# Patient Record
Sex: MALE | Race: BLACK OR AFRICAN AMERICAN | Employment: PART TIME | URBAN - METROPOLITAN AREA
[De-identification: names, ages, dates, MRNs, and addresses within clinical notes are randomized per-mention and may not be internally consistent; named-entity substitution may affect disease eponyms.]

---

## 2018-03-04 ENCOUNTER — ED HISTORICAL/CONVERTED ENCOUNTER (OUTPATIENT)
Dept: OTHER | Age: 43
End: 2018-03-04

## 2018-03-06 ENCOUNTER — ED HISTORICAL/CONVERTED ENCOUNTER (OUTPATIENT)
Dept: OTHER | Age: 43
End: 2018-03-06

## 2019-09-22 ENCOUNTER — ED HISTORICAL/CONVERTED ENCOUNTER (OUTPATIENT)
Dept: OTHER | Age: 44
End: 2019-09-22

## 2019-09-23 ENCOUNTER — ED HISTORICAL/CONVERTED ENCOUNTER (OUTPATIENT)
Dept: OTHER | Age: 44
End: 2019-09-23

## 2020-06-29 ENCOUNTER — ED HISTORICAL/CONVERTED ENCOUNTER (OUTPATIENT)
Dept: OTHER | Age: 45
End: 2020-06-29

## 2020-07-30 ENCOUNTER — OP HISTORICAL/CONVERTED ENCOUNTER (OUTPATIENT)
Dept: OTHER | Age: 45
End: 2020-07-30

## 2020-08-04 DIAGNOSIS — R10.9 UNSPECIFIED ABDOMINAL PAIN: ICD-10-CM

## 2020-08-04 RX ORDER — OMEPRAZOLE 40 MG/1
CAPSULE, DELAYED RELEASE ORAL
Qty: 30 CAP | Refills: 2 | Status: SHIPPED | OUTPATIENT
Start: 2020-08-04 | End: 2020-11-08

## 2020-10-04 ENCOUNTER — APPOINTMENT (OUTPATIENT)
Dept: CT IMAGING | Age: 45
End: 2020-10-04
Attending: EMERGENCY MEDICINE
Payer: MEDICARE

## 2020-10-04 ENCOUNTER — HOSPITAL ENCOUNTER (EMERGENCY)
Age: 45
Discharge: HOME | End: 2020-10-04
Attending: EMERGENCY MEDICINE
Payer: MEDICARE

## 2020-10-04 VITALS
BODY MASS INDEX: 43.16 KG/M2 | TEMPERATURE: 98.1 F | OXYGEN SATURATION: 98 % | SYSTOLIC BLOOD PRESSURE: 148 MMHG | RESPIRATION RATE: 16 BRPM | WEIGHT: 275 LBS | DIASTOLIC BLOOD PRESSURE: 86 MMHG | HEART RATE: 86 BPM | HEIGHT: 67 IN

## 2020-10-04 DIAGNOSIS — G51.0 BELL'S PALSY: Primary | ICD-10-CM

## 2020-10-04 LAB
ALBUMIN SERPL-MCNC: 4 G/DL (ref 3.5–5)
ALBUMIN/GLOB SERPL: 1.1 {RATIO} (ref 1.1–2.2)
ALP SERPL-CCNC: 91 U/L (ref 45–117)
ALT SERPL-CCNC: 34 U/L (ref 12–78)
ANION GAP SERPL CALC-SCNC: 4 MMOL/L (ref 5–15)
AST SERPL W P-5'-P-CCNC: 27 U/L (ref 15–37)
BILIRUB DIRECT SERPL-MCNC: 0.1 MG/DL (ref 0–0.2)
BILIRUB SERPL-MCNC: 0.4 MG/DL (ref 0.2–1)
BUN SERPL-MCNC: 11 MG/DL (ref 6–20)
BUN/CREAT SERPL: 12 (ref 12–20)
CA-I BLD-MCNC: 9.1 MG/DL (ref 8.5–10.1)
CHLORIDE SERPL-SCNC: 102 MMOL/L (ref 97–108)
CO2 SERPL-SCNC: 29 MMOL/L (ref 21–32)
CREAT SERPL-MCNC: 0.9 MG/DL (ref 0.7–1.3)
ERYTHROCYTE [DISTWIDTH] IN BLOOD BY AUTOMATED COUNT: 13.3 % (ref 11.5–14.5)
GLOBULIN SER CALC-MCNC: 3.8 G/DL (ref 2–4)
GLUCOSE SERPL-MCNC: 104 MG/DL (ref 65–100)
HCT VFR BLD AUTO: 45 % (ref 36.6–50.3)
HGB BLD-MCNC: 16.4 G/DL (ref 12.1–17)
LIPASE SERPL-CCNC: 84 U/L (ref 73–393)
MCH RBC QN AUTO: 30.5 PG (ref 26–34)
MCHC RBC AUTO-ENTMCNC: 36.4 G/DL (ref 30–36.5)
MCV RBC AUTO: 83.8 FL (ref 80–99)
PLATELET # BLD AUTO: 295 K/UL (ref 150–400)
PMV BLD AUTO: 9.4 FL (ref 8.9–12.9)
POTASSIUM SERPL-SCNC: 4.2 MMOL/L (ref 3.5–5.1)
PROT SERPL-MCNC: 7.8 G/DL (ref 6.4–8.2)
RBC # BLD AUTO: 5.37 M/UL (ref 4.1–5.7)
SODIUM SERPL-SCNC: 135 MMOL/L (ref 136–145)
TROPONIN I SERPL-MCNC: <0.05 NG/ML
WBC # BLD AUTO: 6.8 K/UL (ref 4.1–11.1)

## 2020-10-04 PROCEDURE — 93005 ELECTROCARDIOGRAM TRACING: CPT

## 2020-10-04 PROCEDURE — 96374 THER/PROPH/DIAG INJ IV PUSH: CPT

## 2020-10-04 PROCEDURE — 84484 ASSAY OF TROPONIN QUANT: CPT

## 2020-10-04 PROCEDURE — 99281 EMR DPT VST MAYX REQ PHY/QHP: CPT

## 2020-10-04 PROCEDURE — 80048 BASIC METABOLIC PNL TOTAL CA: CPT

## 2020-10-04 PROCEDURE — 70450 CT HEAD/BRAIN W/O DYE: CPT

## 2020-10-04 PROCEDURE — 74011000636 HC RX REV CODE- 636: Performed by: EMERGENCY MEDICINE

## 2020-10-04 PROCEDURE — 74011250636 HC RX REV CODE- 250/636: Performed by: EMERGENCY MEDICINE

## 2020-10-04 PROCEDURE — 96375 TX/PRO/DX INJ NEW DRUG ADDON: CPT

## 2020-10-04 PROCEDURE — 83690 ASSAY OF LIPASE: CPT

## 2020-10-04 PROCEDURE — 74177 CT ABD & PELVIS W/CONTRAST: CPT

## 2020-10-04 PROCEDURE — 80076 HEPATIC FUNCTION PANEL: CPT

## 2020-10-04 PROCEDURE — 36415 COLL VENOUS BLD VENIPUNCTURE: CPT

## 2020-10-04 PROCEDURE — 85027 COMPLETE CBC AUTOMATED: CPT

## 2020-10-04 RX ORDER — KETOROLAC TROMETHAMINE 30 MG/ML
15 INJECTION, SOLUTION INTRAMUSCULAR; INTRAVENOUS
Status: DISCONTINUED | OUTPATIENT
Start: 2020-10-04 | End: 2020-10-04

## 2020-10-04 RX ORDER — ACETAMINOPHEN 500 MG
TABLET ORAL
Qty: 1 EACH | Refills: 0 | Status: SHIPPED | OUTPATIENT
Start: 2020-10-04 | End: 2021-09-16 | Stop reason: ALTCHOICE

## 2020-10-04 RX ORDER — KETOROLAC TROMETHAMINE 30 MG/ML
30 INJECTION, SOLUTION INTRAMUSCULAR; INTRAVENOUS
Status: COMPLETED | OUTPATIENT
Start: 2020-10-04 | End: 2020-10-04

## 2020-10-04 RX ORDER — PREDNISONE 50 MG/1
50 TABLET ORAL DAILY
Qty: 7 TAB | Refills: 0 | Status: SHIPPED | OUTPATIENT
Start: 2020-10-04 | End: 2020-10-11

## 2020-10-04 RX ORDER — METOCLOPRAMIDE HYDROCHLORIDE 5 MG/ML
10 INJECTION INTRAMUSCULAR; INTRAVENOUS
Status: COMPLETED | OUTPATIENT
Start: 2020-10-04 | End: 2020-10-04

## 2020-10-04 RX ORDER — MINERAL OIL, PETROLATUM 425; 568 MG/G; MG/G
OINTMENT OPHTHALMIC
Qty: 7 G | Refills: 0 | Status: SHIPPED | OUTPATIENT
Start: 2020-10-04 | End: 2020-10-04 | Stop reason: SDUPTHER

## 2020-10-04 RX ORDER — DIPHENHYDRAMINE HYDROCHLORIDE 50 MG/ML
50 INJECTION, SOLUTION INTRAMUSCULAR; INTRAVENOUS ONCE
Status: COMPLETED | OUTPATIENT
Start: 2020-10-04 | End: 2020-10-04

## 2020-10-04 RX ORDER — PREDNISONE 50 MG/1
50 TABLET ORAL DAILY
Qty: 7 TAB | Refills: 0 | Status: SHIPPED | OUTPATIENT
Start: 2020-10-04 | End: 2020-10-04 | Stop reason: SDUPTHER

## 2020-10-04 RX ORDER — ACETAMINOPHEN 500 MG
TABLET ORAL
Qty: 1 EACH | Refills: 0 | Status: SHIPPED | OUTPATIENT
Start: 2020-10-04 | End: 2020-10-04 | Stop reason: SDUPTHER

## 2020-10-04 RX ORDER — ACYCLOVIR 800 MG/1
800 TABLET ORAL 3 TIMES DAILY
Qty: 15 TAB | Refills: 0 | Status: SHIPPED | OUTPATIENT
Start: 2020-10-04 | End: 2020-10-04 | Stop reason: SDUPTHER

## 2020-10-04 RX ORDER — MINERAL OIL, PETROLATUM 425; 568 MG/G; MG/G
OINTMENT OPHTHALMIC
Qty: 7 G | Refills: 0 | Status: SHIPPED | OUTPATIENT
Start: 2020-10-04 | End: 2021-09-16 | Stop reason: ALTCHOICE

## 2020-10-04 RX ORDER — ACYCLOVIR 800 MG/1
800 TABLET ORAL 3 TIMES DAILY
Qty: 15 TAB | Refills: 0 | Status: SHIPPED | OUTPATIENT
Start: 2020-10-04 | End: 2020-10-09

## 2020-10-04 RX ADMIN — METOCLOPRAMIDE 10 MG: 5 INJECTION, SOLUTION INTRAMUSCULAR; INTRAVENOUS at 11:47

## 2020-10-04 RX ADMIN — KETOROLAC TROMETHAMINE 30 MG: 30 INJECTION, SOLUTION INTRAMUSCULAR at 11:48

## 2020-10-04 RX ADMIN — IOPAMIDOL 100 ML: 755 INJECTION, SOLUTION INTRAVENOUS at 12:37

## 2020-10-04 RX ADMIN — SODIUM CHLORIDE 1000 ML: 9 INJECTION, SOLUTION INTRAVENOUS at 11:46

## 2020-10-04 RX ADMIN — DIPHENHYDRAMINE HYDROCHLORIDE 50 MG: 50 INJECTION, SOLUTION INTRAMUSCULAR; INTRAVENOUS at 11:47

## 2020-10-04 NOTE — ED PROVIDER NOTES
EMERGENCY DEPARTMENT HISTORY AND PHYSICAL EXAM        Date: 10/4/2020  Patient Name: Floridalma Atkinson    History of Presenting Illness     Chief Complaint   Patient presents with    Facial Droop    Numbness     to his face    Headache       History Provided By: Patient    HPI: Floridalma Atkinson, 41 y.o. male with past medical history of hypertension and migraines who presents with headache and numbness to his face. States symptoms started yesterday around 4 PM.  The day before that he also noticed he was having difficulty with eating. At 4 PM yesterday he noticed that he was having numbness on the right side of his face and felt like his face was swelling on the right. Family members noticed his face was asymmetric as well. He is also having a headache. States that he does have headaches often and has been dealing with this for years. Headache is on the right side of his head, constant, and nonradiating. PCP: Zaira Miller MD    Current Outpatient Medications   Medication Sig Dispense Refill    omeprazole (PRILOSEC) 40 mg capsule TAKE 1 CAPSULE BY MOUTH ONCE DAILY,30 MINUTES BEFORE EATING 30 Cap 2    lisinopril (PRINIVIL, ZESTRIL) 10 mg tablet Take 1 Tab by mouth daily. 90 Tab 1    oxybutynin chloride XL (DITROPAN XL) 10 mg CR tablet Take 1 Tab by mouth daily. 30 Tab 0       Past History     Past Medical History:  Past Medical History:   Diagnosis Date    Hypertension     Migraines        Past Surgical History:  Past Surgical History:   Procedure Laterality Date    HX APPENDECTOMY         Family History:  No family history on file. Social History:  Social History     Tobacco Use    Smoking status: Never Smoker    Smokeless tobacco: Never Used   Substance Use Topics    Alcohol use: No    Drug use: Not on file       Allergies:   Allergies   Allergen Reactions    Keflex [Cephalexin] Anaphylaxis and Shortness of Breath       Review of Systems   Review of Systems   Constitutional: Negative for fever. HENT: Negative for congestion. Eyes: Negative for visual disturbance. Respiratory: Negative for shortness of breath. Cardiovascular: Negative for chest pain. Gastrointestinal: Negative for abdominal pain. Genitourinary: Negative for dysuria. Musculoskeletal: Negative for arthralgias. Skin: Negative for rash. Neurological: Positive for facial asymmetry, numbness and headaches. Physical Exam   General: No acute distressed. Well-nourished. Skin: No rash. Head: Normocephalic. Atraumatic. Eye: No proptosis or conjunctival injections. Respiratory: No apparent respiratory distress. Gastrointestinal: Nondistended. Musculoskeletal: No obvious bony deformities. Psychiatric: Cooperative. Appropriate mood and affect. Neuro: Left-sided facial droop including the forehead. 5 out of 5 strength in bilateral upper and lower extremities. Intact sensation to light touch in extremities and on the face. No other focal deficits. Diagnostic Study Results     Labs -   No results found for this or any previous visit (from the past 24 hour(s)). Radiologic Studies -   No orders to display     CT Results  (Last 48 hours)    None        CXR Results  (Last 48 hours)    None        Medical Decision Making and ED Course     I reviewed the available vital signs, nursing notes, past medical history, past surgical history, family history, and social history. Vital Signs - Reviewed the patient's vital signs. Patient Vitals for the past 12 hrs:   Temp Pulse Resp BP SpO2   10/04/20 1044 98.1 °F (36.7 °C) 86 16 (!) 148/86 98 %     EKG interpretation: Normal sinus rhythm at 86 bpm.  MT interval 144 ms. QRS duration and QTc intervals are both normal.  No ST segment abnormalities. Normal axis. Medical Decision Making:   Presented with headache and right-sided facial sensation.  The  differential diagnosis is Bell's palsy, stroke, migraine headache, tension headache, intracranial hemorrhage. Suspect likely Bell's palsy based on my clinical assessment. CT head shows no intracranial hemorrhage. Headache treated with medications here in the ER. He did develop abdominal pain in the right upper near the chest so brought her work-up was completed. This pain improved on its own. Negative work-up was unremarkable and patient was well-appearing. Do not suspect stroke based on patient physical exam.  I discussed this with neurologist and he felt it was reasonable to discharge to home with treatment for Bell's palsy. We will follow-up with neurology only as needed. I prescribed acyclovir, prednisone, eyepatch, and moistening eye ointment for the left eye. Disposition     Discharged    DISCHARGE PLAN:  1. Current Discharge Medication List      CONTINUE these medications which have NOT CHANGED    Details   omeprazole (PRILOSEC) 40 mg capsule TAKE 1 CAPSULE BY MOUTH ONCE DAILY,30 MINUTES BEFORE EATING  Qty: 30 Cap, Refills: 2    Associated Diagnoses: Unspecified abdominal pain      lisinopril (PRINIVIL, ZESTRIL) 10 mg tablet Take 1 Tab by mouth daily. Qty: 90 Tab, Refills: 1    Associated Diagnoses: Essential hypertension      oxybutynin chloride XL (DITROPAN XL) 10 mg CR tablet Take 1 Tab by mouth daily. Qty: 30 Tab, Refills: 0    Associated Diagnoses: OAB (overactive bladder)           2. Follow-up Information     Follow up With Specialties Details Why [facility] EMERGENCY DEPT Emergency Medicine Go today As soon as possible if symptoms worsen [address] [facility] Rodrigo Winston MD Neurology Schedule an appointment as soon as possible for a visit in 1 week As needed if symptoms worsen or do not improve [address]  [phone]          3. Return to ED if worse     Diagnosis     Clinical impression:   1.  Bell's palsy       Attestation:  Please note that this dictation was completed with Dragon, the computer voice recognition software. Quite often unanticipated grammatical, syntax, homophones, and other interpretive errors are inadvertently transcribed by the computer software. Please disregard these errors. Please excuse any errors that have escaped final proofreading. Thank you.   Sanket Thomas, DO

## 2020-10-05 LAB
ATRIAL RATE: 86 BPM
CALCULATED P AXIS, ECG09: 39 DEGREES
CALCULATED R AXIS, ECG10: 6 DEGREES
CALCULATED T AXIS, ECG11: 16 DEGREES
DIAGNOSIS, 93000: NORMAL
P-R INTERVAL, ECG05: 144 MS
Q-T INTERVAL, ECG07: 354 MS
QRS DURATION, ECG06: 86 MS
QTC CALCULATION (BEZET), ECG08: 423 MS
VENTRICULAR RATE, ECG03: 86 BPM

## 2020-10-10 PROBLEM — D72.829 LEUKOCYTOSIS: Status: ACTIVE | Noted: 2020-10-10

## 2020-10-10 PROBLEM — R35.0 INCREASED FREQUENCY OF URINATION: Status: ACTIVE | Noted: 2020-10-10

## 2020-10-10 PROBLEM — M79.641 PAIN IN BOTH HANDS: Status: ACTIVE | Noted: 2020-10-10

## 2020-10-10 PROBLEM — L91.8 MULTIPLE ACQUIRED SKIN TAGS: Status: ACTIVE | Noted: 2020-10-10

## 2020-10-10 PROBLEM — J30.2 SEASONAL ALLERGIC RHINITIS: Status: ACTIVE | Noted: 2020-10-10

## 2020-10-10 PROBLEM — R20.2 PARESTHESIA OF LEFT UPPER LIMB: Status: ACTIVE | Noted: 2020-10-10

## 2020-10-10 PROBLEM — R25.2 HAND CRAMPS: Status: ACTIVE | Noted: 2020-10-10

## 2020-10-10 PROBLEM — N52.9 IMPOTENCE: Status: ACTIVE | Noted: 2020-10-10

## 2020-10-10 PROBLEM — E55.9 VITAMIN D DEFICIENCY: Status: ACTIVE | Noted: 2020-10-10

## 2020-10-10 PROBLEM — M79.642 PAIN IN BOTH HANDS: Status: ACTIVE | Noted: 2020-10-10

## 2020-10-12 ENCOUNTER — OFFICE VISIT (OUTPATIENT)
Dept: INTERNAL MEDICINE CLINIC | Age: 45
End: 2020-10-12
Payer: MEDICARE

## 2020-10-12 VITALS
DIASTOLIC BLOOD PRESSURE: 84 MMHG | HEART RATE: 72 BPM | OXYGEN SATURATION: 97 % | BODY MASS INDEX: 50.62 KG/M2 | TEMPERATURE: 98 F | WEIGHT: 315 LBS | SYSTOLIC BLOOD PRESSURE: 134 MMHG | RESPIRATION RATE: 18 BRPM | HEIGHT: 66 IN

## 2020-10-12 DIAGNOSIS — J30.2 SEASONAL ALLERGIC RHINITIS, UNSPECIFIED TRIGGER: ICD-10-CM

## 2020-10-12 DIAGNOSIS — I10 ESSENTIAL HYPERTENSION: ICD-10-CM

## 2020-10-12 DIAGNOSIS — E66.01 OBESITY, MORBID (HCC): ICD-10-CM

## 2020-10-12 DIAGNOSIS — K21.9 GASTROESOPHAGEAL REFLUX DISEASE WITHOUT ESOPHAGITIS: ICD-10-CM

## 2020-10-12 DIAGNOSIS — G51.0 BELL'S PALSY: Primary | ICD-10-CM

## 2020-10-12 PROBLEM — Z86.69 HX OF BELL'S PALSY: Status: ACTIVE | Noted: 2020-10-12

## 2020-10-12 PROCEDURE — 99214 OFFICE O/P EST MOD 30 MIN: CPT | Performed by: INTERNAL MEDICINE

## 2020-10-12 RX ORDER — METOPROLOL SUCCINATE 25 MG/1
1 TABLET, EXTENDED RELEASE ORAL DAILY
COMMUNITY
Start: 2020-07-13 | End: 2021-01-18 | Stop reason: SDUPTHER

## 2020-10-12 NOTE — PROGRESS NOTES
Roman Yung is a 41 y.o. male and presents with Follow Up Chronic Condition and Hypertension    He visited emergency room having facial drooping, left side clinically, he has been ruled out for stroke and he has left sided facial palsy, he has finished, steroid and acyclovir he is using patch and, cream to prevent dry eye. His blood pressure is almost controlled with current medication regimen, no recent other complaints he is wearing mask working at [employer], his BMI is 51.8, he has not consulted ENT, and not done physical therapy he wants to know more details about facial palsy,, he has not read the educational handouts, provided from emergency room. ,  No history of shingles or chickenpox. He visited emergency room on fourth October    Review of Systems    Review of Systems   Constitutional: Negative. HENT: Negative for tinnitus. Eyes: Negative for blurred vision. Respiratory: Negative for cough, shortness of breath and wheezing. Cardiovascular: Negative. Gastrointestinal: Negative. Genitourinary: Negative. Musculoskeletal: Negative. Neurological: Positive for focal weakness. Negative for dizziness and headaches. Lt sided Carmine palsy. palsy. Psychiatric/Behavioral: Negative for depression. The patient is not nervous/anxious.          Past Medical History:   Diagnosis Date    Hand cramps 10/10/2020    Hypercholesterolemia     Hypertension     Impotence 10/10/2020    Increased frequency of urination 10/10/2020    Leukocytosis 10/10/2020    Migraines     Multiple acquired skin tags 10/10/2020    Pain in both hands 10/10/2020    Paresthesia of left upper limb 10/10/2020    Seasonal allergic rhinitis 10/10/2020    Vitamin D deficiency 10/10/2020     Past Surgical History:   Procedure Laterality Date    HX APPENDECTOMY  1993    HX PROSTATE SURGERY      HX UROLOGICAL       Social History     Socioeconomic History    Marital status:      Spouse name: Not on file    Number of children: Not on file    Years of education: Not on file    Highest education level: Not on file   Tobacco Use    Smoking status: Never Smoker    Smokeless tobacco: Never Used   Substance and Sexual Activity    Alcohol use: No    Drug use: Never     Family History   Adopted: Yes   Family history unknown: Yes     Current Outpatient Medications   Medication Sig Dispense Refill    metoprolol succinate (TOPROL-XL) 25 mg XL tablet Take 1 Tab by mouth daily.  Eye Patch misc Apply patch to affected eye nightly at bedtime for 1-2 weeks or until facial symptoms resolve 1 Each 0    white petrolatum-mineral oiL (Refresh Lacri-Lube) 56.8-42.5 % ointment Apply small amount to affected eye nightly for 1-2 weeks or until facial symptoms have resolved 7 g 0    omeprazole (PRILOSEC) 40 mg capsule TAKE 1 CAPSULE BY MOUTH ONCE DAILY,30 MINUTES BEFORE EATING 30 Cap 2    lisinopril (PRINIVIL, ZESTRIL) 10 mg tablet Take 1 Tab by mouth daily. 90 Tab 1     Allergies   Allergen Reactions    Keflex [Cephalexin] Anaphylaxis and Shortness of Breath       Objective:  Visit Vitals  /84 (BP 1 Location: Right arm, BP Patient Position: Sitting)   Pulse 72   Temp 98 °F (36.7 °C) (Temporal)   Resp 18   Ht 5' 6\" (1.676 m)   Wt 321 lb 6.4 oz (145.8 kg)   SpO2 97%   BMI 51.88 kg/m²       Physical Exam:   Constitutional: General Appearance: Morbid obese with pleasant . Level of Distress: NAD. Psychiatric: Mental Status: normal mood and affect Orientation: to time, place, and person. ,normal eye contact. Head: Head: normocephalic and atraumatic. Eyes: Pupils: PERRLA. Sclerae: non-icteric. Not able to close left eye completely due to facial palsy  Neck: Neck: supple, trachea midline, and no masses. Lymph Nodes: no cervical LAD. Thyroid: no enlargement or nodules and non-tender. Lungs: Respiratory effort: no dyspnea.  Auscultation: no wheezing, rales/crackles, or rhonchi and breath sounds normal and good air movement. Cardiovascular: Apical Impulse: not displaced. Heart Auscultation: normal S1 and S2; no murmurs, rubs, or gallops; and RRR. Neck vessels: no carotid bruits. Pulses including femoral / pedal: normal throughout. Abdomen: Bowel Sounds: normal. Inspection and Palpation: no tenderness, guarding, or masses and soft and non-distended. Liver: non-tender and no hepatomegaly. Spleen: non-tender and no splenomegaly. Musculoskeletal[de-identified] Extremities: no edema,no varicosities. No Calf tenderness. Neurologic: Gait and Station: normal gait and station. Motor Strength normal right  left-sided face weakness due to left-sided Bell's palsy. Skin: Inspection and palpation: no rash, lesions, or ulcer. Left-sided Bell's palsy  Results for orders placed or performed during the hospital encounter of 83/07/48   METABOLIC PANEL, BASIC   Result Value Ref Range    Sodium 135 (L) 136 - 145 mmol/L    Potassium 4.2 3.5 - 5.1 mmol/L    Chloride 102 97 - 108 mmol/L    CO2 29 21 - 32 mmol/L    Anion gap 4 (L) 5 - 15 mmol/L    Glucose 104 (H) 65 - 100 mg/dL    BUN 11 6 - 20 mg/dL    Creatinine 0.90 0.70 - 1.30 mg/dL    BUN/Creatinine ratio 12 12 - 20      GFR est AA >60 >60 ml/min/1.73m2    GFR est non-AA >60 >60 ml/min/1.73m2    Calcium 9.1 8.5 - 10.1 mg/dL   CBC W/O DIFF   Result Value Ref Range    WBC 6.8 4.1 - 11.1 K/uL    RBC 5.37 4.10 - 5.70 M/uL    HGB 16.4 12.1 - 17.0 g/dL    HCT 45.0 36.6 - 50.3 %    MCV 83.8 80.0 - 99.0 FL    MCH 30.5 26.0 - 34.0 PG    MCHC 36.4 30.0 - 36.5 g/dL    RDW 13.3 11.5 - 14.5 %    PLATELET 751 362 - 134 K/uL    MPV 9.4 8.9 - 12.9 FL   HEPATIC FUNCTION PANEL   Result Value Ref Range    Protein, total 7.8 6.4 - 8.2 g/dL    Albumin 4.0 3.5 - 5.0 g/dL    Globulin 3.8 2.0 - 4.0 g/dL    A-G Ratio 1.1 1.1 - 2.2      Bilirubin, total 0.4 0.2 - 1.0 mg/dL    Bilirubin, direct 0.1 0.0 - 0.2 mg/dL    Alk.  phosphatase 91 45 - 117 U/L    AST (SGOT) 27 15 - 37 U/L    ALT (SGPT) 34 12 - 78 U/L   LIPASE Result Value Ref Range    Lipase 84 73 - 393 U/L   TROPONIN I   Result Value Ref Range    Troponin-I, Qt. <0.05 <0.05 ng/mL   EKG, 12 LEAD, INITIAL   Result Value Ref Range    Ventricular Rate 86 BPM    Atrial Rate 86 BPM    P-R Interval 144 ms    QRS Duration 86 ms    Q-T Interval 354 ms    QTC Calculation (Bezet) 423 ms    Calculated P Axis 39 degrees    Calculated R Axis 6 degrees    Calculated T Axis 16 degrees    Diagnosis       Normal sinus rhythm  Normal ECG  When compared with ECG of 22-SEP-2019 18:51,  No significant change was found  Confirmed by Geremias Portillo MD, RUTH ([id number]) on 10/5/2020 4:32:49 PM         Assessment/Plan:      ICD-10-CM ICD-9-CM    1. Bell's palsy  G51.0 351.0 REFERRAL TO PHYSICAL THERAPY      REFERRAL TO NEUROLOGY   2. Essential hypertension  I10 401.9    3. Seasonal allergic rhinitis, unspecified trigger  J30.2 477.9    4. Obesity, morbid ([facility] [address]  E66.01 278.01    5. Gastroesophageal reflux disease without esophagitis  K21.9 530.81      Orders Placed This Encounter    REFERRAL TO PHYSICAL THERAPY     Referral Priority:   Routine     Referral Type:   PT/OT/ST     Referral Reason:   Specialty Services Required     Requested Specialty:   Physical Therapy     Number of Visits Requested:   1    REFERRAL TO NEUROLOGY     Referral Priority:   Routine     Referral Type:   Consultation     Referral Reason:   Specialty Services Required     Referred to Provider:   Emily Hutchison MD     Requested Specialty:   Neurology     Number of Visits Requested:   1    metoprolol succinate (TOPROL-XL) 25 mg XL tablet     Sig: Take 1 Tab by mouth daily. left-sided Bell's palsy he has visited emergency room on fourth October due to the weakness on the left side of the face started all of a sudden with facial drooping he has been ruled out for stroke, he has completed, taking acyclovir and steroid he is not able to close his left eye completely and wearing, patch and putting the cream to prevent a dry eye. Recommended to do exercise and educational handouts provided because he told me that he has not read completely however he is assured that he has no stroke I have referred him to ENT specialist and for physical therapy twice a week for 4 weeks,. He should try to lose weight. Hypertension controlled continue lisinopril 10 mg once a day and metoprolol ER 25 mg once a day in the past she had anxiety and panic attacks so he is taking beta-blocker also,. GERD taking omeprazole. Morbid obesity I had a long discussion that he should lose at least 50 pounds in next 6 months and then more to bring his BMI less than 35 explained the real practical difficulties but he has to count his calories and he has to walk, and there are calorie restricted should be about 1200 -calorie/day and walking minimum 35 minutes to 45 minutes 5 days a week and, sometimes more than that. He has no depression. No exposure to COVID-19. Follow-up in 3 months. lose weight, increase physical activity, follow low salt diet, continue present plan, call if any problems    There are no Patient Instructions on file for this visit. Follow-up and Dispositions    · Return in about 3 months (around 1/12/2021) for bells palsy .htn.

## 2020-10-19 ENCOUNTER — HOSPITAL ENCOUNTER (OUTPATIENT)
Dept: PHYSICAL THERAPY | Age: 45
Discharge: HOME | End: 2020-10-19
Payer: MEDICARE

## 2020-10-19 PROCEDURE — 97162 PT EVAL MOD COMPLEX 30 MIN: CPT

## 2020-10-19 NOTE — PROGRESS NOTES
[address] [address] [facility] [address] [facility], [place], [address] [place] [address]  Ph: [phone]    Fax: [phone]    Initial Evaluation/Plan of Care/Statement of Necessity for Physical Therapy Services     Patient name: Daquan Potts   : 1975  [x]  Patient  Verified Provider#: [id number]    Start of Care: 10/19/2020      Referral source: Nilam Madrid MD Return visit to MD:     Medical/Treatment Diagnosis: Muscle weakness (generalized) [M62.81]    Payor:  [facility] / Plan: [address] [place] [address] HMO / Product Type: Managed Care Medicare /       Prior Hospitalization: see medical history     Comorbidities: high blood pressure  Prior Level of Function: Independent  Medications: Verified on Patient Summary List          Patient / Family readiness to learn indicated by: asking questions, trying to perform skills and interest  Persons(s) to be included in education: patient (P)  Barriers to Learning/Limitations: None  Patient Self Reported Health Status: good  Rehabilitation Potential: good  Previous Treatment/Compliance: None  PMHx/Surgical Hx: N/A  Work Hx: Works at [employer]: Lives with significant other. Barriers to progress:N/A  Motivation: Good   Substance use: None  Cognition: A & O x 3  Onset Date: 2-3 weeks ago   In time:09:30am   Out time:10:05am Total Treatment Time (min): 35min   Total Timed Codes (min): 0 1:1 Treatment Time (MC only): 35min  Visit #: 1 Visit count could not be calculated. Make sure you are using a visit which is associated with an episode. SUBJECTIVE  Patient stated the Belly's Palsy started about 2-3 weeks ago, with numbness and difficulty with speech on his Right side, he went to ER and MRI to r/o CVA imaging were cleared. Then his PCP referred him to physical therapy. Currently he has a \"wired sensation\" inside his mouth and teeth on the left side.  He has a droop on the left side of his mouth when he eats, his taste buds are not back to normal and he pockets the food on the left side of his mouth. He also noticed a difficulty closing his left eyes. His left eye is watery during the day, he wears a patch and he can't close his left eye at night. He is taking medication : prednisone and Acyclovir for 7 days which just ended and eye drops. Functional limitation: eating, difficulty seeing clear on left eye due to watery s/s, reading and sleeping. Things that worsen pain: Patient reports no pain. Things that ease pain: patient reports no pain  Pain Level (0-10 scale) pre treatment: N/A Pain Level (0-10 scale) post treatment: N/A    OBJECTIVE  Neck ROM /MMT WNL   Observation - weatery left eye, slight drop on left side of face. Sensation: decreased light touch and prick point on V2 area on left side compared to right. MMT - noted weakness with the following:  - Difficulty rasing his left eyebrow and wrinkling left side of forehead. - Difficulty closing/opening his  left eye  - Difficulty smiling on left side - mild drop in lips noted. - Difficulty puckering his lips and wrinking his nose  - Difficulty puffing his cheeks and clenching his teeth's      10 min Therapeutic Exercise:  [x] See flow sheet : facial exercies given    Rationale: increase ROM, increase strength and improve coordination to improve the patients ability to improve the facial weakness and improve his ability to eat, drink, close /open his left eye and improve his, daily functions and self esteem. With   [x] TE   [] TA   [] neuro   [] other: Patient Education: [x] Review HEP    [] Progressed/Changed HEP based on:   [] positioning   [] body mechanics   [] transfers   [] heat/ice application    [] other: Tolerance    Objective/Functional Measures  HEP with video instructions were provided to facilitate facial muscles and improve the ability to use the muscles for longer periods of time. A You tube link also provided with videos.    Patient educated to do exercises with mirror 2 a day for 5 reps each to start. As well as massaging around eye, cheek and chin  ASSESSMENT/Changes in Function:  Patient was referred to skilled physical therapy services with dx [place] Palsy. Patient presents with inability to actively contract left facial musculature, facial weakness noted decreased sensation in v2 distribution,  watery eye, difficulty closing/opening his left eye, drop left side corner of his mouth and clenching teeth/eating impairments. Pateint wears a patch during night. Patient is at early stages of facial paralysis and he  will benefit from skilled PT services to improve facial muscle strength and control to return thus patient will be able to  perform his daily function, improve his communication, self esteem and quality of life. Problem List/Impairments: decrease ROM, decrease strength, decrease ADL/ functional abilitiies and decrease flexibility/ joint mobility  Patient Goal (s): \"Return back to normal  Short Term Goals: To be accomplished in 2 weeks:  1. Patient will be independent with HEP within 2 weeks to augment his POC. 2. Patient with report decrease frequency in watery eye within 2 weeks. 3. Patient will be able to open/close his left eye without assistance    Long Term Goals: To be accomplished in 8-12  weeks:  1. Patient will report ability to sleep at night to improve his quality of life. 2. Patient will report less difficulty with seeing and reading from his left eye to be able to do his job and ADL's.  3. Patient will report less pocketing on his left side of his mouth    Frequency / Duration: Patient to be seen 1-2 every other week for 8-12  weeks.   Certification Period: 10/19/2020-1/30/2021    Treatment Plan may include any combination of the following: Therapeutic exercise, Therapeutic activities, Neuromuscular re-education, Manual therapy, Patient education and Self Care training    Patient/ Caregiver education and instruction: self care and exercises    [x]  Plan of care has been reviewed with PTA. The Plan of Care is based on information from the initial evaluation. Leela Saldaña, PT 10/19/2020     ________________________________________________________________________    I certify that the above Therapy Services are being furnished while the patient is under my care. I agree with the treatment plan and certify that this therapy is necessary.     [de-identified] Signature:____________________  Date:____________Time: _________

## 2020-11-17 ENCOUNTER — TELEPHONE (OUTPATIENT)
Dept: INTERNAL MEDICINE CLINIC | Age: 45
End: 2020-11-17

## 2020-11-17 RX ORDER — OMEPRAZOLE 40 MG/1
40 CAPSULE, DELAYED RELEASE ORAL DAILY
Qty: 30 CAP | Refills: 5 | Status: SHIPPED | OUTPATIENT
Start: 2020-11-17 | End: 2021-09-16

## 2020-11-17 NOTE — TELEPHONE ENCOUNTER
Patient's wife called stating that Mr. Leonel Neal needs a refill for Omeprazole 40 mg.       LOV:  10/12/2020  NOV: 1-

## 2020-11-27 NOTE — ANCILLARY DISCHARGE INSTRUCTIONS
[address]  Ph: [phone]  Fax: [phone]    Discharge Summary 2-15    Patient name: Bi Clay  : 1975  Provider#: [id number]  Referral source: Janis Cerda MD      Medical/Treatment Diagnosis: Muscle weakness (generalized) [M62.81]     Prior Hospitalization: see medical history     Comorbidities: See Plan of Care  Prior Level of Function: See Plan of Care  Medications: Verified on Patient Summary List  Start of Care: 10/19/2020   Onset Date:10/12/2020  Visits from Start of Care: 1  Missed Visits: 0  Reporting Period : 10/19/2020  to 10/19/2020    Assessment/Summary of care/GOALS:   Therapist followed up with patient 2 weeks after evaluation  to check how patient was doing with HEP, patient stated he was doing fine and reported HEP were helping. Patient was done contacted twice when authorization was received from insurance, office left view voice mails  to schedule further appointments however patient has called back. Patient will be D/C from skilled PT services at this time. Patient Goal (s): \"Return back to normal  Short Term Goals: To be accomplished in 2 weeks:  1. Patient will be independent with HEP within 2 weeks to augment his POC. [x] Met [] Not met [] Partially met   2. Patient with report decrease frequency in watery eye within 2 weeks. [] Met [] Not met [] Partially met   3. Patient will be able to open/close his left eye without assistance [] Met [] Not met [] Partially met      Long Term Goals: To be accomplished in 8-12  weeks:  1. Patient will report ability to sleep at night to improve his quality of life. [] Met [] Not met [] Partially met   2. Patient will report less difficulty with seeing and reading from his left eye to be able to do his job and ADL's. [] Met [] Not met [] Partially met   3.  Patient will report less pocketing on his left side of his mouth [] Met [] Not met [] Partially met      RECOMMENDATIONS:  [x]Discontinue therapy: []Patient has reached or is progressing toward set goals     [x]Patient is non-compliant or has abdicated     []Due to lack of appreciable progress towards set goals     []Other  Leela Saldaña, PT 11/27/2020

## 2021-01-18 ENCOUNTER — OFFICE VISIT (OUTPATIENT)
Dept: INTERNAL MEDICINE CLINIC | Age: 46
End: 2021-01-18
Payer: MEDICARE

## 2021-01-18 VITALS
HEART RATE: 72 BPM | WEIGHT: 307 LBS | SYSTOLIC BLOOD PRESSURE: 110 MMHG | BODY MASS INDEX: 49.34 KG/M2 | HEIGHT: 66 IN | DIASTOLIC BLOOD PRESSURE: 74 MMHG | OXYGEN SATURATION: 98 % | RESPIRATION RATE: 18 BRPM

## 2021-01-18 DIAGNOSIS — J30.2 SEASONAL ALLERGIC RHINITIS, UNSPECIFIED TRIGGER: ICD-10-CM

## 2021-01-18 DIAGNOSIS — K21.9 GASTROESOPHAGEAL REFLUX DISEASE WITHOUT ESOPHAGITIS: ICD-10-CM

## 2021-01-18 DIAGNOSIS — I10 ESSENTIAL HYPERTENSION: ICD-10-CM

## 2021-01-18 DIAGNOSIS — Z86.69 HX OF BELL'S PALSY: ICD-10-CM

## 2021-01-18 DIAGNOSIS — J45.20 MILD INTERMITTENT ASTHMA WITHOUT COMPLICATION: ICD-10-CM

## 2021-01-18 DIAGNOSIS — E66.01 OBESITY, MORBID (HCC): ICD-10-CM

## 2021-01-18 PROCEDURE — 99213 OFFICE O/P EST LOW 20 MIN: CPT | Performed by: INTERNAL MEDICINE

## 2021-01-18 PROCEDURE — G8752 SYS BP LESS 140: HCPCS | Performed by: INTERNAL MEDICINE

## 2021-01-18 PROCEDURE — G8754 DIAS BP LESS 90: HCPCS | Performed by: INTERNAL MEDICINE

## 2021-01-18 PROCEDURE — G8417 CALC BMI ABV UP PARAM F/U: HCPCS | Performed by: INTERNAL MEDICINE

## 2021-01-18 PROCEDURE — G8427 DOCREV CUR MEDS BY ELIG CLIN: HCPCS | Performed by: INTERNAL MEDICINE

## 2021-01-18 PROCEDURE — G8510 SCR DEP NEG, NO PLAN REQD: HCPCS | Performed by: INTERNAL MEDICINE

## 2021-01-18 RX ORDER — ALBUTEROL SULFATE 90 UG/1
2 AEROSOL, METERED RESPIRATORY (INHALATION)
Qty: 1 INHALER | Refills: 3 | Status: SHIPPED | OUTPATIENT
Start: 2021-01-18

## 2021-01-18 RX ORDER — METOPROLOL SUCCINATE 25 MG/1
25 TABLET, EXTENDED RELEASE ORAL DAILY
Qty: 30 TAB | Refills: 5 | Status: SHIPPED | OUTPATIENT
Start: 2021-01-18 | End: 2021-02-22

## 2021-01-18 NOTE — PROGRESS NOTES
Valdez Matos is a 41 y.o. male and presents with Facial Droop and Hypertension    Mr. Sandrine Campos is much happy that now he has no more paresthesias or weakness of facial muscles having history of Bell's palsy and he finished physical therapy that I referred, his blood pressure is well controlled taking lisinopril and metoprolol, he needs refills, he is not able to lose weight he takes omeprazole as needed for symptoms of GERD,. He has no depression works at [employer] still has not received COVID-19 vaccine and no symptoms from COVID-19. Review of Systems    Review of Systems   Constitutional: Negative. HENT: Negative for sinus pain and sore throat. Eyes: Negative for blurred vision. Respiratory: Negative for cough, shortness of breath and wheezing. Cardiovascular: Negative. Gastrointestinal: Negative. Genitourinary: Negative. Musculoskeletal: Negative. Neurological: Negative for dizziness, tingling, tremors and headaches. Psychiatric/Behavioral: Negative for depression and memory loss. The patient is not nervous/anxious and does not have insomnia.          Past Medical History:   Diagnosis Date    Hand cramps 10/10/2020    Hypercholesterolemia     Hypertension     Impotence 10/10/2020    Increased frequency of urination 10/10/2020    Leukocytosis 10/10/2020    Migraines     Mild intermittent asthma without complication 8/41/7253    Multiple acquired skin tags 10/10/2020    Pain in both hands 10/10/2020    Paresthesia of left upper limb 10/10/2020    Seasonal allergic rhinitis 10/10/2020    Vitamin D deficiency 10/10/2020     Past Surgical History:   Procedure Laterality Date    HX APPENDECTOMY  1993    HX PROSTATE SURGERY      HX UROLOGICAL       Social History     Socioeconomic History    Marital status: SINGLE     Spouse name: Not on file    Number of children: Not on file    Years of education: Not on file    Highest education level: Not on file   Tobacco Use    Smoking status: Never Smoker    Smokeless tobacco: Never Used   Substance and Sexual Activity    Alcohol use: No    Drug use: Never     Family History   Adopted: Yes   Family history unknown: Yes     Current Outpatient Medications   Medication Sig Dispense Refill    albuterol (PROVENTIL HFA, VENTOLIN HFA, PROAIR HFA) 90 mcg/actuation inhaler Take 2 Puffs by inhalation every six (6) hours as needed for Wheezing. 1 Inhaler 3    metoprolol succinate (TOPROL-XL) 25 mg XL tablet Take 1 Tab by mouth daily. 30 Tab 5    omeprazole (PRILOSEC) 40 mg capsule Take 1 Cap by mouth daily. 30 Cap 5    lisinopril (PRINIVIL, ZESTRIL) 10 mg tablet Take 1 Tab by mouth daily. 90 Tab 1    Eye Patch misc Apply patch to affected eye nightly at bedtime for 1-2 weeks or until facial symptoms resolve 1 Each 0    white petrolatum-mineral oiL (Refresh Lacri-Lube) 56.8-42.5 % ointment Apply small amount to affected eye nightly for 1-2 weeks or until facial symptoms have resolved 7 g 0     Allergies   Allergen Reactions    Keflex [Cephalexin] Anaphylaxis and Shortness of Breath       Objective:  Visit Vitals  /74 (BP 1 Location: Left arm, BP Patient Position: Sitting)   Pulse 72   Resp 18   Ht 5' 6\" (1.676 m)   Wt 307 lb (139.3 kg)   SpO2 98%   BMI 49.55 kg/m²       Physical Exam:   Constitutional: General Appearance: Pleasant. Level of Distress: NAD. Psychiatric: Mental Status: normal mood and affect Orientation: to time, place, and person. ,normal eye contact. Head: Head: normocephalic and atraumatic. Eyes: Pupils: PERRLA. Sclerae: non-icteric. Neck: Neck: supple, trachea midline, and no masses. Lymph Nodes: no cervical LAD. Thyroid: no enlargement or nodules and non-tender. Lungs: Respiratory effort: no dyspnea. Auscultation: no wheezing, rales/crackles, or rhonchi and breath sounds normal and good air movement. Cardiovascular: Apical Impulse: not displaced.  Heart Auscultation: normal S1 and S2; no murmurs, rubs, or gallops; and RRR. Neck vessels: no carotid bruits. Pulses including femoral / pedal: normal throughout. Abdomen: Bowel Sounds: normal. Inspection and Palpation: no tenderness, guarding, or masses and soft and non-distended. Liver: non-tender and no hepatomegaly. Spleen: non-tender and no splenomegaly. Musculoskeletal[de-identified] Extremities: no edema,no varicosities. No Calf tenderness. Neurologic: Gait and Station: normal gait and station. Motor Strength normal right and left. Skin: Inspection and palpation: no rash, lesions, or ulcer. Results for orders placed or performed during the hospital encounter of 19/22/00   METABOLIC PANEL, BASIC   Result Value Ref Range    Sodium 135 (L) 136 - 145 mmol/L    Potassium 4.2 3.5 - 5.1 mmol/L    Chloride 102 97 - 108 mmol/L    CO2 29 21 - 32 mmol/L    Anion gap 4 (L) 5 - 15 mmol/L    Glucose 104 (H) 65 - 100 mg/dL    BUN 11 6 - 20 mg/dL    Creatinine 0.90 0.70 - 1.30 mg/dL    BUN/Creatinine ratio 12 12 - 20      GFR est AA >60 >60 ml/min/1.73m2    GFR est non-AA >60 >60 ml/min/1.73m2    Calcium 9.1 8.5 - 10.1 mg/dL   CBC W/O DIFF   Result Value Ref Range    WBC 6.8 4.1 - 11.1 K/uL    RBC 5.37 4.10 - 5.70 M/uL    HGB 16.4 12.1 - 17.0 g/dL    HCT 45.0 36.6 - 50.3 %    MCV 83.8 80.0 - 99.0 FL    MCH 30.5 26.0 - 34.0 PG    MCHC 36.4 30.0 - 36.5 g/dL    RDW 13.3 11.5 - 14.5 %    PLATELET 789 492 - 792 K/uL    MPV 9.4 8.9 - 12.9 FL   HEPATIC FUNCTION PANEL   Result Value Ref Range    Protein, total 7.8 6.4 - 8.2 g/dL    Albumin 4.0 3.5 - 5.0 g/dL    Globulin 3.8 2.0 - 4.0 g/dL    A-G Ratio 1.1 1.1 - 2.2      Bilirubin, total 0.4 0.2 - 1.0 mg/dL    Bilirubin, direct 0.1 0.0 - 0.2 mg/dL    Alk.  phosphatase 91 45 - 117 U/L    AST (SGOT) 27 15 - 37 U/L    ALT (SGPT) 34 12 - 78 U/L   LIPASE   Result Value Ref Range    Lipase 84 73 - 393 U/L   TROPONIN I   Result Value Ref Range    Troponin-I, Qt. <0.05 <0.05 ng/mL   EKG, 12 LEAD, INITIAL   Result Value Ref Range    Ventricular Rate 86 BPM    Atrial Rate 86 BPM    P-R Interval 144 ms    QRS Duration 86 ms    Q-T Interval 354 ms    QTC Calculation (Bezet) 423 ms    Calculated P Axis 39 degrees    Calculated R Axis 6 degrees    Calculated T Axis 16 degrees    Diagnosis       Normal sinus rhythm  Normal ECG  When compared with ECG of 22-SEP-2019 18:51,  No significant change was found  Confirmed by Glo Grace MD, Susanna Prasad ([id number]) on 10/5/2020 4:32:49 PM         Assessment/Plan:      ICD-10-CM ICD-9-CM    1. Essential hypertension  I10 401.9    2. Mild intermittent asthma without complication  Z57.41 394.70    3. Gastroesophageal reflux disease without esophagitis  K21.9 530.81    4. Obesity, morbid ([facility] [address]  E66.01 278.01    5. Hx of Bell's palsy  Z86.69 V12.49    6. Seasonal allergic rhinitis, unspecified trigger  J30.2 477.9      Orders Placed This Encounter    albuterol (PROVENTIL HFA, VENTOLIN HFA, PROAIR HFA) 90 mcg/actuation inhaler     Sig: Take 2 Puffs by inhalation every six (6) hours as needed for Wheezing. Dispense:  1 Inhaler     Refill:  3    metoprolol succinate (TOPROL-XL) 25 mg XL tablet     Sig: Take 1 Tab by mouth daily. Dispense:  30 Tab     Refill:  5         Hypertension controlled continue lisinopril 10 mg once a day and metoprolol ER 25 mg once a day. Diet low in sodium. GERD, watching diet and eating mindful also heart healthy diet, and precautions to prevent GERD, he takes omeprazole as needed.    , He has sometimes wheezing explained to lose weight given albuterol he has history of allergies but currently it is not bothering much she does not need refills. He does not smoke cigarette he does not know any triggers,    History of Bell palsy, finished physical therapy and now he has no more weakness on face and eyes he is very happy. Morbid obesity lifestyle modification with 1400-calorie diet plan and his BMI is 49.5. Walking minimum  7000 steps per day.       lose weight, increase physical activity, continue present plan, routine labs ordered had his labs done in October, follow-up in 4 months. We will do labs in 4 months. There are no Patient Instructions on file for this visit. Follow-up and Dispositions    · Return for htn and other concerns.

## 2021-01-23 ENCOUNTER — HOSPITAL ENCOUNTER (EMERGENCY)
Age: 46
Discharge: HOME | End: 2021-01-23
Attending: FAMILY MEDICINE
Payer: MEDICARE

## 2021-01-23 ENCOUNTER — APPOINTMENT (OUTPATIENT)
Dept: GENERAL RADIOLOGY | Age: 46
End: 2021-01-23
Attending: FAMILY MEDICINE
Payer: MEDICARE

## 2021-01-23 VITALS
HEIGHT: 66 IN | WEIGHT: 315 LBS | DIASTOLIC BLOOD PRESSURE: 91 MMHG | TEMPERATURE: 99.8 F | RESPIRATION RATE: 22 BRPM | SYSTOLIC BLOOD PRESSURE: 152 MMHG | OXYGEN SATURATION: 93 % | BODY MASS INDEX: 50.62 KG/M2 | HEART RATE: 115 BPM

## 2021-01-23 DIAGNOSIS — S29.011A INTERCOSTAL MUSCLE STRAIN, INITIAL ENCOUNTER: Primary | ICD-10-CM

## 2021-01-23 PROCEDURE — 71101 X-RAY EXAM UNILAT RIBS/CHEST: CPT

## 2021-01-23 PROCEDURE — 96372 THER/PROPH/DIAG INJ SC/IM: CPT

## 2021-01-23 PROCEDURE — 74011250636 HC RX REV CODE- 250/636: Performed by: FAMILY MEDICINE

## 2021-01-23 PROCEDURE — 99283 EMERGENCY DEPT VISIT LOW MDM: CPT

## 2021-01-23 RX ORDER — KETOROLAC TROMETHAMINE 30 MG/ML
30 INJECTION, SOLUTION INTRAMUSCULAR; INTRAVENOUS
Status: COMPLETED | OUTPATIENT
Start: 2021-01-23 | End: 2021-01-23

## 2021-01-23 RX ORDER — NAPROXEN 500 MG/1
500 TABLET ORAL 2 TIMES DAILY WITH MEALS
Qty: 20 TAB | Refills: 0 | Status: SHIPPED | OUTPATIENT
Start: 2021-01-23 | End: 2021-09-16

## 2021-01-23 RX ADMIN — KETOROLAC TROMETHAMINE 30 MG: 30 INJECTION, SOLUTION INTRAMUSCULAR at 04:26

## 2021-01-23 NOTE — ED TRIAGE NOTES
Pt reports r side pain and shoulder pain since yesterday. Pt denies heavy lifting or any obvious mechanism of injury.

## 2021-01-23 NOTE — ED PROVIDER NOTES
EMERGENCY DEPARTMENT HISTORY AND PHYSICAL EXAM      Date: 1/23/2021  Patient Name: Marisela Zavala    History of Presenting Illness     Chief Complaint   Patient presents with    Shoulder Pain    Rib Pain       History Provided By:     HPI: Marisela Zavala, is an extremely pleasant 41 y.o. male presenting to the ED with cc of right-sided rib pain. He states yesterday he woke up and was going to work when he noticed severe right-sided rib pain. It is worse when he twists or turns or bends. He states it is also very tender when he presses on this area. He denies any injury nor overuse. He has never had anything like this before. He states the pain was excruciating. Slightly better now but still very uncomfortable. Otherwise he denies any chest pain, shortness of breath, nausea, vomiting, diarrhea, weakness, dizziness, confusion. He has not taken anything for the pain. He is not finding that makes it better nor worse. He denies other concerns. There are no other complaints, changes, or physical findings at this time. PCP: Frances Purvis MD    No current facility-administered medications on file prior to encounter. Current Outpatient Medications on File Prior to Encounter   Medication Sig Dispense Refill    albuterol (PROVENTIL HFA, VENTOLIN HFA, PROAIR HFA) 90 mcg/actuation inhaler Take 2 Puffs by inhalation every six (6) hours as needed for Wheezing. 1 Inhaler 3    metoprolol succinate (TOPROL-XL) 25 mg XL tablet Take 1 Tab by mouth daily. 30 Tab 5    omeprazole (PRILOSEC) 40 mg capsule Take 1 Cap by mouth daily.  30 Cap 5    Eye Patch misc Apply patch to affected eye nightly at bedtime for 1-2 weeks or until facial symptoms resolve 1 Each 0    white petrolatum-mineral oiL (Refresh Lacri-Lube) 56.8-42.5 % ointment Apply small amount to affected eye nightly for 1-2 weeks or until facial symptoms have resolved 7 g 0    lisinopril (PRINIVIL, ZESTRIL) 10 mg tablet Take 1 Tab by mouth daily. 80 Tab 1       Past History     Past Medical History:  Past Medical History:   Diagnosis Date    Hand cramps 10/10/2020    Hypercholesterolemia     Hypertension     Impotence 10/10/2020    Increased frequency of urination 10/10/2020    Leukocytosis 10/10/2020    Migraines     Mild intermittent asthma without complication 3/96/4559    Multiple acquired skin tags 10/10/2020    Pain in both hands 10/10/2020    Paresthesia of left upper limb 10/10/2020    Seasonal allergic rhinitis 10/10/2020    Vitamin D deficiency 10/10/2020       Past Surgical History:  Past Surgical History:   Procedure Laterality Date    HX APPENDECTOMY  1993    HX PROSTATE SURGERY      HX UROLOGICAL         Family History:  Family History   Adopted: Yes   Family history unknown: Yes       Social History:  Social History     Tobacco Use    Smoking status: Never Smoker    Smokeless tobacco: Never Used   Substance Use Topics    Alcohol use: No    Drug use: Never       Allergies: Allergies   Allergen Reactions    Keflex [Cephalexin] Anaphylaxis and Shortness of Breath         Review of Systems     Review of Systems   Constitutional: Negative for activity change, appetite change, chills, fatigue and fever. HENT: Negative for congestion and sore throat. Eyes: Negative for photophobia and visual disturbance. Respiratory: Negative for cough, shortness of breath and wheezing. Cardiovascular: Negative for chest pain, palpitations and leg swelling. Gastrointestinal: Negative for abdominal pain, diarrhea, nausea and vomiting. Endocrine: Negative for cold intolerance and heat intolerance. Musculoskeletal: Negative for gait problem and joint swelling. Right-sided rib pain   Skin: Negative for color change and rash. Neurological: Negative for dizziness and headaches. Physical Exam     Physical Exam  Constitutional:       Appearance: Normal appearance.    HENT:      Head: Normocephalic and atraumatic. Mouth/Throat:      Mouth: Mucous membranes are moist.      Pharynx: Oropharynx is clear. Eyes:      Extraocular Movements: Extraocular movements intact. Conjunctiva/sclera: Conjunctivae normal.   Neck:      Musculoskeletal: Normal range of motion and neck supple. Cardiovascular:      Rate and Rhythm: Normal rate and regular rhythm. Heart sounds: Normal heart sounds. No murmur. Pulmonary:      Effort: Pulmonary effort is normal. No respiratory distress. Breath sounds: Normal breath sounds. No wheezing, rhonchi or rales. Abdominal:      General: There is no distension. Palpations: Abdomen is soft. Tenderness: There is no abdominal tenderness. There is no guarding. Musculoskeletal:      Comments: Exquisite tenderness with palpation along the lateral aspect of the right ribs 7 through 9. Skin:     Findings: No erythema or rash. Neurological:      General: No focal deficit present. Mental Status: He is alert and oriented to person, place, and time. Sensory: No sensory deficit. Motor: No weakness. Gait: Gait normal.   Psychiatric:         Mood and Affect: Mood normal.         Behavior: Behavior normal.         Lab and Diagnostic Study Results     Labs -   No results found for this or any previous visit (from the past 12 hour(s)). Radiologic Studies -   @lastxrresult@  CT Results  (Last 48 hours)    None        CXR Results  (Last 48 hours)    None            Medical Decision Making   - I am the first provider for this patient. - I reviewed the vital signs, available nursing notes, past medical history, past surgical history, family history and social history. - Initial assessment performed. The patients presenting problems have been discussed, and they are in agreement with the care plan formulated and outlined with them. I have encouraged them to ask questions as they arise throughout their visit.     Vital Signs-Reviewed the patient's vital signs. Patient Vitals for the past 12 hrs:   Temp Pulse Resp BP SpO2   01/23/21 0354 99.8 °F (37.7 °C) (!) 115 22 (!) 152/91 93 %         ED Course/ Provider Notes (Medical Decision Making):     Patient presented to the emergency with a chief complaint of right-sided rib pain. He was noted to have exquisite reproducible pain with palpation along the right lateral ribs 7 through 10. X-ray showed: Underinflated lungs may be accentuating the findings. Linear opacities in both lower lobes probably due to atelectasis although mild airspace  disease could appear similar. Small bilateral effusions could be present. No  right rib abnormality. He states he was unable to take a deep breath during the x-ray because his ribs hurt. He denied any chest pain or shortness of breath. He was given 30 mg of Toradol. Upon reevaluation he is feeling better. He was discharged home with a prescription for naproxen. Juan Oliver was given a thorough list of signs and symptoms that would warrant an immediate return to the emergency department. Otherwise Juan Oliver will follow up with PCP. Procedures   Medical Decision Makingedical Decision Making  Performed by: Peter Sanders DO  Procedures  None       Disposition   Disposition:     All of the diagnostic tests were reviewed and questions answered. Diagnosis, care plan and treatment options were discussed. The patient understands the instructions and will follow up as directed. The patients results have been reviewed with them. They have been counseled regarding their diagnosis. The patient verbally convey understanding and agreement of the signs, symptoms, diagnosis, treatment and prognosis and additionally agrees to follow up as recommended with their PCP in 24 - 48 hours. They also agree with the care-plan and convey that all of their questions have been answered.   I have also put together some discharge instructions for them that include: 1) educational information regarding their diagnosis, 2) how to care for their diagnosis at home, as well a 3) list of reasons why they would want to return to the ED prior to their follow-up appointment, should their condition change. Home     DISCHARGE PLAN:    1. Current Discharge Medication List      CONTINUE these medications which have NOT CHANGED    Details   albuterol (PROVENTIL HFA, VENTOLIN HFA, PROAIR HFA) 90 mcg/actuation inhaler Take 2 Puffs by inhalation every six (6) hours as needed for Wheezing. Qty: 1 Inhaler, Refills: 3      metoprolol succinate (TOPROL-XL) 25 mg XL tablet Take 1 Tab by mouth daily. Qty: 30 Tab, Refills: 5      omeprazole (PRILOSEC) 40 mg capsule Take 1 Cap by mouth daily. Qty: 30 Cap, Refills: 5      Eye Patch misc Apply patch to affected eye nightly at bedtime for 1-2 weeks or until facial symptoms resolve  Qty: 1 Each, Refills: 0      white petrolatum-mineral oiL (Refresh Lacri-Lube) 56.8-42.5 % ointment Apply small amount to affected eye nightly for 1-2 weeks or until facial symptoms have resolved  Qty: 7 g, Refills: 0      lisinopril (PRINIVIL, ZESTRIL) 10 mg tablet Take 1 Tab by mouth daily. Qty: 90 Tab, Refills: 1    Associated Diagnoses: Essential hypertension               2.   Follow-up Information     Follow up With Specialties Details Why Contact Info    Tim Mcclendon MD Internal Medicine Schedule an appointment as soon as possible for a visit   [address] [place] [address],[address], [facility]  [phone]              3.  Return to ED if worse       4. Current Discharge Medication List            Diagnosis     Clinical Impression:    1. Intercostal muscle strain, initial encounter        Attestations:    Abbey Logan, DO    Please note that this dictation was completed with Dem[employer], the computer voice recognition software.   Quite often unanticipated grammatical, syntax, homophones, and other interpretive errors are inadvertently transcribed by the computer software. Please disregard these errors. Please excuse any errors that have escaped final proofreading. Thank you.

## 2021-01-23 NOTE — DISCHARGE INSTRUCTIONS
Thank you! Thank you for allowing me to care for you in the emergency department. I sincerely hope that you are satisfied with your visit today. It is my goal to provide you with excellent care. Below you will find a list of your labs and imaging from your visit today. Should you have any questions regarding these results please do not hesitate to call the emergency department. Labs -   No results found for this or any previous visit (from the past 12 hour(s)). Radiologic Studies -   XR RIBS RT W PA CXR MIN 3 V    (Results Pending)     CT Results  (Last 48 hours)      None          CXR Results  (Last 48 hours)      None               If you feel that you have not received excellent quality care or timely care, please ask to speak to the nurse manager. Please choose us in the future for your continued health care needs. ------------------------------------------------------------------------------------------------------------  The exam and treatment you received in the Emergency Department were for an urgent problem and are not intended as complete care. It is important that you follow-up with a doctor, nurse practitioner, or physician assistant to:  (1) confirm your diagnosis,  (2) re-evaluation of changes in your illness and treatment, and  (3) for ongoing care. If your symptoms become worse or you do not improve as expected and you are unable to reach your usual health care provider, you should return to the Emergency Department. We are available 24 hours a day. Please take your discharge instructions with you when you go to your follow-up appointment. If you have any problem arranging a follow-up appointment, contact the Emergency Department immediately. If a prescription has been provided, please have it filled as soon as possible to prevent a delay in treatment. Read the entire medication instruction sheet provided to you by the pharmacy.  If you have any questions or reservations about taking the medication due to side effects or interactions with other medications, please call your primary care physician or contact the ER to speak with the charge nurse. Make an appointment with your family doctor or the physician you were referred to for follow-up of this visit as instructed on your discharge paperwork, as this is a mandatory follow-up. Return to the ER if you are unable to be seen or if you are unable to be seen in a timely manner. If you have any problem arranging the follow-up visit, contact the Emergency Department immediately.

## 2021-01-23 NOTE — Clinical Note
[address] [address] [place] [address] 
[phone] Work/School Note Date: 1/23/2021 To Whom It May concern: 
 
 
Rebeca Armando was seen and treated today in the emergency room by the following provider(s): 
Attending Provider: Treasure Norris is excused from work/school on 01/23/21. He is clear to return to work/school on 01/24/21. Sincerely, Shanelle Piña, DO

## 2021-01-23 NOTE — LETTER
[address] [address] [place] [address] 
[phone] Work/School Note Date: 1/23/2021 To Whom It May concern: 
 
 
Angie Pa was seen and treated today in the emergency room by the following provider(s): 
Attending Provider: Leajosé Falcon is excused from work/school on 01/23/21. He is clear to return to work/school on 01/24/21. Sincerely, Lyman Seip, DO

## 2021-09-12 PROBLEM — E78.5 DYSLIPIDEMIA: Status: ACTIVE | Noted: 2021-09-12

## 2021-09-16 ENCOUNTER — OFFICE VISIT (OUTPATIENT)
Dept: INTERNAL MEDICINE CLINIC | Age: 46
End: 2021-09-16
Payer: MEDICARE

## 2021-09-16 VITALS
BODY MASS INDEX: 50.62 KG/M2 | OXYGEN SATURATION: 93 % | HEIGHT: 66 IN | RESPIRATION RATE: 12 BRPM | HEART RATE: 80 BPM | SYSTOLIC BLOOD PRESSURE: 120 MMHG | WEIGHT: 315 LBS | DIASTOLIC BLOOD PRESSURE: 80 MMHG

## 2021-09-16 DIAGNOSIS — M25.561 CHRONIC PAIN OF RIGHT KNEE: ICD-10-CM

## 2021-09-16 DIAGNOSIS — I10 ESSENTIAL HYPERTENSION: ICD-10-CM

## 2021-09-16 DIAGNOSIS — Z86.69 HX OF BELL'S PALSY: ICD-10-CM

## 2021-09-16 DIAGNOSIS — G89.29 CHRONIC PAIN OF RIGHT KNEE: ICD-10-CM

## 2021-09-16 DIAGNOSIS — E78.5 DYSLIPIDEMIA: ICD-10-CM

## 2021-09-16 DIAGNOSIS — E66.01 OBESITY, MORBID (HCC): ICD-10-CM

## 2021-09-16 DIAGNOSIS — K21.9 GASTROESOPHAGEAL REFLUX DISEASE WITHOUT ESOPHAGITIS: ICD-10-CM

## 2021-09-16 PROCEDURE — G8510 SCR DEP NEG, NO PLAN REQD: HCPCS | Performed by: INTERNAL MEDICINE

## 2021-09-16 PROCEDURE — G8754 DIAS BP LESS 90: HCPCS | Performed by: INTERNAL MEDICINE

## 2021-09-16 PROCEDURE — G8417 CALC BMI ABV UP PARAM F/U: HCPCS | Performed by: INTERNAL MEDICINE

## 2021-09-16 PROCEDURE — G8752 SYS BP LESS 140: HCPCS | Performed by: INTERNAL MEDICINE

## 2021-09-16 PROCEDURE — 99214 OFFICE O/P EST MOD 30 MIN: CPT | Performed by: INTERNAL MEDICINE

## 2021-09-16 PROCEDURE — G8427 DOCREV CUR MEDS BY ELIG CLIN: HCPCS | Performed by: INTERNAL MEDICINE

## 2021-09-16 RX ORDER — DICLOFENAC SODIUM 75 MG/1
75 TABLET, DELAYED RELEASE ORAL
Qty: 60 TABLET | Refills: 0 | Status: SHIPPED | OUTPATIENT
Start: 2021-09-16 | End: 2022-04-08

## 2021-09-16 RX ORDER — METOPROLOL SUCCINATE 25 MG/1
25 TABLET, EXTENDED RELEASE ORAL DAILY
Qty: 90 TABLET | Refills: 2 | Status: SHIPPED | OUTPATIENT
Start: 2021-09-16 | End: 2022-06-13

## 2021-09-16 RX ORDER — DEXTROMETHORPHAN HYDROBROMIDE, GUAIFENESIN 5; 100 MG/5ML; MG/5ML
650 LIQUID ORAL
Qty: 180 TABLET | Refills: 0 | Status: SHIPPED | OUTPATIENT
Start: 2021-09-16

## 2021-09-16 RX ORDER — LISINOPRIL 10 MG/1
10 TABLET ORAL DAILY
Qty: 90 TABLET | Refills: 2 | Status: SHIPPED | OUTPATIENT
Start: 2021-09-16 | End: 2022-06-13

## 2021-09-16 NOTE — PROGRESS NOTES
Erlinda Ponce is a 40 y.o. male and presents with Follow Up Chronic Condition and Hypertension    Mr. Richardson came for regular follow-up after a long time. His blood pressure is well controlled taking current medication lisinopril 10 mg/day and metoprolol ER 25 mg/day. She has pain in the right knee joint and stiffness after sitting for prolonged time. is BMI is 51 .81 recently he lost his mother-in-law.,  He has no depression. he does not take Tylenol or any NSAIDs. He had history of Bell's palsy but now he is fine. No history of fall or trauma. Currently no symptoms from GERD. No depression. Taken 2 doses of COVID-19 vaccine. Review of Systems    Review of Systems   Constitutional: Negative. HENT: Negative for congestion and sore throat. Eyes: Negative for blurred vision. Respiratory: Negative for cough and wheezing. Cardiovascular: Negative. Gastrointestinal: Negative. Genitourinary: Negative for hematuria and urgency. Musculoskeletal: Positive for joint pain. Knee pain and stiffness. Neurological: Negative for dizziness, tingling, tremors, speech change and headaches. Psychiatric/Behavioral: Negative for depression, hallucinations, memory loss and substance abuse. The patient is not nervous/anxious and does not have insomnia.          Past Medical History:   Diagnosis Date    Hand cramps 10/10/2020    Hypercholesterolemia     Hypertension     Impotence 10/10/2020    Increased frequency of urination 10/10/2020    Leukocytosis 10/10/2020    Migraines     Mild intermittent asthma without complication 8/69/5114    Multiple acquired skin tags 10/10/2020    Pain in both hands 10/10/2020    Paresthesia of left upper limb 10/10/2020    Seasonal allergic rhinitis 10/10/2020    Vitamin D deficiency 10/10/2020     Past Surgical History:   Procedure Laterality Date    HX APPENDECTOMY  1993    HX PROSTATE SURGERY      HX UROLOGICAL       Social History Socioeconomic History    Marital status:      Spouse name: Not on file    Number of children: Not on file    Years of education: Not on file    Highest education level: Not on file   Tobacco Use    Smoking status: Never Smoker    Smokeless tobacco: Never Used   Substance and Sexual Activity    Alcohol use: No    Drug use: Never     Social Determinants of Health     Financial Resource Strain:     Difficulty of Paying Living Expenses:    Food Insecurity:     Worried About Running Out of Food in the Last Year:     [address] in the Last Year:    Transportation Needs:     Lack of Transportation (Medical):  Lack of Transportation (Non-Medical):    Physical Activity:     Days of Exercise per Week:     Minutes of Exercise per Session:    Stress:     Feeling of Stress :    Social Connections:     Frequency of Communication with Friends and Family:     Frequency of Social Gatherings with Friends and Family:     Attends [employer] Services:     Active Member of Clubs or Organizations:     Attends Club or Organization Meetings:     Marital Status:      Family History   Adopted: Yes   Family history unknown: Yes     Current Outpatient Medications   Medication Sig Dispense Refill    diclofenac EC (VOLTAREN) 75 mg EC tablet Take 1 Tablet by mouth two (2) times daily as needed for Pain. Take with food. 60 Tablet 0    lisinopriL (PRINIVIL, ZESTRIL) 10 mg tablet Take 1 Tablet by mouth daily. 90 Tablet 2    metoprolol succinate (TOPROL-XL) 25 mg XL tablet Take 1 Tablet by mouth daily. 90 Tablet 2    acetaminophen (Tylenol 8 Hour) 650 mg TbER Take 1 Tablet by mouth two (2) times daily as needed for Pain. 180 Tablet 0    albuterol (PROVENTIL HFA, VENTOLIN HFA, PROAIR HFA) 90 mcg/actuation inhaler Take 2 Puffs by inhalation every six (6) hours as needed for Wheezing.  1 Inhaler 3     Allergies   Allergen Reactions    Keflex [Cephalexin] Anaphylaxis and Shortness of Breath Objective:  Visit Vitals  /80 (BP 1 Location: Right upper arm, BP Patient Position: Sitting, BP Cuff Size: Large adult)   Pulse 80   Resp 12   Ht 5' 6\" (1.676 m)   Wt 321 lb (145.6 kg)   SpO2 93%   BMI 51.81 kg/m²       Physical Exam:   Constitutional: General Appearance: Pleasant. Level of Distress: NAD. Psychiatric: Mental Status: normal mood and affect Orientation: to time, place, and person. ,normal eye contact. Head: Head: normocephalic and atraumatic. Eyes: Pupils: PERRLA. Sclerae: non-icteric. Neck: Neck: supple, trachea midline, and no masses. Lymph Nodes: no cervical LAD. Thyroid: no enlargement or nodules and non-tender. Lungs: Respiratory effort: no dyspnea. Auscultation: no wheezing, rales/crackles, or rhonchi and breath sounds normal and good air movement. Cardiovascular: Apical Impulse: not displaced. Heart Auscultation: normal S1 and S2; no murmurs, rubs, or gallops; and RRR. Neck vessels: no carotid bruits. Pulses including femoral / pedal: normal throughout. Abdomen: Bowel Sounds: normal. Inspection and Palpation: no tenderness, guarding, or masses and soft and non-distended. Liver: non-tender and no hepatomegaly. Spleen: non-tender and no splenomegaly. Musculoskeletal[de-identified] Extremities: no edema,no varicosities. No Calf tenderness. Neurologic: Gait and Station: normal gait and station. Motor Strength normal right and left. Skin: Inspection and palpation: no rash, lesions, or ulcer.        Results for orders placed or performed during the hospital encounter of 39/10/09   METABOLIC PANEL, BASIC   Result Value Ref Range    Sodium 135 (L) 136 - 145 mmol/L    Potassium 4.2 3.5 - 5.1 mmol/L    Chloride 102 97 - 108 mmol/L    CO2 29 21 - 32 mmol/L    Anion gap 4 (L) 5 - 15 mmol/L    Glucose 104 (H) 65 - 100 mg/dL    BUN 11 6 - 20 mg/dL    Creatinine 0.90 0.70 - 1.30 mg/dL    BUN/Creatinine ratio 12 12 - 20      GFR est AA >60 >60 ml/min/1.73m2    GFR est non-AA >60 >60 ml/min/1.73m2    Calcium 9.1 8.5 - 10.1 mg/dL   CBC W/O DIFF   Result Value Ref Range    WBC 6.8 4.1 - 11.1 K/uL    RBC 5.37 4.10 - 5.70 M/uL    HGB 16.4 12.1 - 17.0 g/dL    HCT 45.0 36.6 - 50.3 %    MCV 83.8 80.0 - 99.0 FL    MCH 30.5 26.0 - 34.0 PG    MCHC 36.4 30.0 - 36.5 g/dL    RDW 13.3 11.5 - 14.5 %    PLATELET 608 849 - 874 K/uL    MPV 9.4 8.9 - 12.9 FL   HEPATIC FUNCTION PANEL   Result Value Ref Range    Protein, total 7.8 6.4 - 8.2 g/dL    Albumin 4.0 3.5 - 5.0 g/dL    Globulin 3.8 2.0 - 4.0 g/dL    A-G Ratio 1.1 1.1 - 2.2      Bilirubin, total 0.4 0.2 - 1.0 mg/dL    Bilirubin, direct 0.1 0.0 - 0.2 mg/dL    Alk. phosphatase 91 45 - 117 U/L    AST (SGOT) 27 15 - 37 U/L    ALT (SGPT) 34 12 - 78 U/L   LIPASE   Result Value Ref Range    Lipase 84 73 - 393 U/L   TROPONIN I   Result Value Ref Range    Troponin-I, Qt. <0.05 <0.05 ng/mL   EKG, 12 LEAD, INITIAL   Result Value Ref Range    Ventricular Rate 86 BPM    Atrial Rate 86 BPM    P-R Interval 144 ms    QRS Duration 86 ms    Q-T Interval 354 ms    QTC Calculation (Bezet) 423 ms    Calculated P Axis 39 degrees    Calculated R Axis 6 degrees    Calculated T Axis 16 degrees    Diagnosis       Normal sinus rhythm  Normal ECG  When compared with ECG of 22-SEP-2019 18:51,  No significant change was found  Confirmed by Suzanna Schmidt MD, RUTH ([id number]) on 10/5/2020 4:32:49 PM         Assessment/Plan:      ICD-10-CM ICD-9-CM    1. Essential hypertension  I10 401.9 lisinopriL (PRINIVIL, ZESTRIL) 10 mg tablet      CBC WITH AUTOMATED DIFF      METABOLIC PANEL, COMPREHENSIVE      TSH 3RD GENERATION   2. Chronic pain of right knee  M25.561 719.46 REFERRAL TO PHYSICAL THERAPY    G89.29 338.29    3. Gastroesophageal reflux disease without esophagitis  K21.9 530.81    4. Dyslipidemia  E78.5 272.4 LIPID PANEL   5. Obesity, morbid ([facility]  E66.01 278.01    6.  Hx of Bell's palsy  Z86.69 V12.49      Orders Placed This Encounter    CBC WITH AUTOMATED DIFF    METABOLIC PANEL, COMPREHENSIVE  LIPID PANEL    TSH 3RD GENERATION    REFERRAL TO PHYSICAL THERAPY     Referral Priority:   Routine     Referral Type:   PT/OT/ST     Referral Reason:   Specialty Services Required     Requested Specialty:   Physical Therapy     Number of Visits Requested:   1    diclofenac EC (VOLTAREN) 75 mg EC tablet     Sig: Take 1 Tablet by mouth two (2) times daily as needed for Pain. Take with food. Dispense:  60 Tablet     Refill:  0    lisinopriL (PRINIVIL, ZESTRIL) 10 mg tablet     Sig: Take 1 Tablet by mouth daily. Dispense:  90 Tablet     Refill:  2    metoprolol succinate (TOPROL-XL) 25 mg XL tablet     Sig: Take 1 Tablet by mouth daily. Dispense:  90 Tablet     Refill:  2    acetaminophen (Tylenol 8 Hour) 650 mg TbER     Sig: Take 1 Tablet by mouth two (2) times daily as needed for Pain. Dispense:  180 Tablet     Refill:  0     Hypertension well controlled continued on lisinopril 10 mg/day and metoprolol ER 25 mg/day with diet low in sodium. Dyslipidemia does not take any statin. Diet low in fat. GERD take omeprazole as needed. Chronic right knee pain  different etiologies explained. No history of fall or trauma however may be due to weight related explained to lose weight with healthy eating habits and take Tylenol 650 mg twice a day as needed and if Tylenol does not help then he can take diclofenac 75 mg once or twice a day very sparingly and side effects of Tylenol and diclofenac explained including NSAID related liver stomach and kidney complications and on blood pressure and he does not drink at all so Tylenol can be safe he should  avoid prolonged sitting and he should stretch in between referred for physical therapy twice a week for 6 weeks and then he can continue doing exercise at home. Now he is doing good after recovered from Bell's palsy.     BMI 51.81 portion control food diary calorie restricted diet and walking to begin with 10 minutes a day to 20 minutes a day and then 30 minutes a day. Follow-up in 6 months. Labs ordered. Refills given. He is up-to-date with 2 doses of COVID-19 vaccine. lose weight, increase physical activity, follow low fat diet, continue present plan, routine labs ordered, call if any problems    There are no Patient Instructions on file for this visit.    Follow-up and Dispositions    · Return in about 4 months (around 1/16/2022) for follow up for chronic condition lab and ref PT.

## 2021-09-16 NOTE — PROGRESS NOTES
Chief Complaint   Patient presents with    Follow Up Chronic Condition    Hypertension     1. Have you been to the ER, urgent care clinic since your last visit? Hospitalized since your last visit? No    2. Have you seen or consulted any other health care providers outside of the [address] [place] [address] since your last visit? Include any pap smears or colon screening.  No     Visit Vitals  /78 (BP 1 Location: Left upper arm, BP Patient Position: Sitting, BP Cuff Size: Adult)   Pulse 98   Resp 12   Ht 5' 6\" (1.676 m)   Wt 321 lb (145.6 kg)   SpO2 93%   BMI 51.81 kg/m²

## 2021-10-11 ENCOUNTER — HOSPITAL ENCOUNTER (OUTPATIENT)
Dept: PHYSICAL THERAPY | Age: 46
Discharge: HOME | End: 2021-10-11
Payer: MEDICARE

## 2021-10-11 PROCEDURE — 97162 PT EVAL MOD COMPLEX 30 MIN: CPT

## 2021-10-18 ENCOUNTER — HOSPITAL ENCOUNTER (OUTPATIENT)
Dept: PHYSICAL THERAPY | Age: 46
Discharge: HOME | End: 2021-10-18
Payer: MEDICARE

## 2021-10-18 PROCEDURE — 97110 THERAPEUTIC EXERCISES: CPT

## 2021-10-18 NOTE — PROGRESS NOTES
PT DAILY TREATMENT NOTE - MCR     Patient Name: Gayatri Rodriguez  Date:10/18/2021  : 1975  [x]  Patient  Verified  Payor: Brooke Grigsbyramón / Plan: [address] [place] [address] HMO / Product Type: Managed Care Medicare /    Treatment Area: Pain in right knee [M25.561]  Other chronic pain [G89.29]   Next MD APPT:   In time: 11:05 am  Out time:12:05  am  Total Treatment Time (min): 60  Total Timed Codes (min): 45  1:1 Treatment Time ( [address] only): 45  Visit #: -  Visit count could not be calculated. Make sure you are using a visit which is associated with an episode. SUBJECTIVE  Pain Level (0-10 scale) pre treatment: 1-2 /10  Pain Level (0-10 scale) post treatment: 2/10 soreness from workout. Any medication changes, allergies to medications, adverse drug reactions, diagnosis change, or new procedure performed?:   [] No    [] Yes (see summary sheet for update)  Subjective functional status/changes:   [] No changes reported  Patient reports not much pain this morning, stated he is feeling much better today, he stopped taking the pain meds since they were too strong and he was having wired side effects. OBJECTIVE    Modality rationale: decrease inflammation, decrease pain, increase tissue extensibility and increase muscle contraction/control to improve the patients ability to reduce pain ambulation.     Min Type Additional Details    [] Estim: []UnAtt   []Att       []TENS instruct                  []IFC  []Premod   []NMES                     []Other:  []w/US   []w/ice   []w/heat  Position:  Location:   15 []  Ice     [x]  Heat  []  Ice massage Position:supine   Location: B knee joint     []  Traction: [] Cervical       []Lumbar                       [] Prone          []Supine                       []Intermittent   []Continuous Lbs:  []w/heat  []W/heat and Estim    []  Ultrasound: []Continuous   [] Pulsed at:                           []1MHz   []3MHz Location:  W/cm2:      [x] Skin assessment post-treatment:   [x]intact  []redness- no adverse reaction   []redness - adverse reaction:   45 min Therapeutic Exercise:  [] See flow sheet :   Rationale: increase ROM, increase strength, improve balance and increase proprioception to improve the patients ability to reduce knee pain with ambulation. With   [x] TE   [] TA   [] Neuro   [] SC   [] other: Patient Education: [] Review HEP    [] Progressed/Changed HEP based on:   [] positioning   [] body mechanics   [] transfers   [] Use of heat/ice    [] other:          Other Objective/Functional Measures:   POC was initiated     ASSESSMENT/Changes in Function:   Patient tolerated session fairly well, no reports of pain, we noted some crepitus with heel slided and overall bilateral LE weakness with exercises. We stated with supine there-ex and HEP was provided to patient, he was encouraged to do his exercises at home as tolerated and use heat has needed. Therapist placed an order for knee brace, which may provide more knee support with ambulation. Patient reports feeling some soreness post exercises. Patient will continue to benefit from skilled PT services to modify and progress therapeutic interventions, address functional mobility deficits, address ROM deficits, address strength deficits, analyze and address soft tissue restrictions, analyze and cue movement patterns, analyze and modify body mechanics/ergonomics and assess and modify postural abnormalities to attain remaining goals. GOALS/Progress towards goals:    Short Term Goals: To be accomplished in 6-8   treatments. 1. Patient will be independent with his HEP to progress with POC. []? Met []? Not met []? Partially met   2. Patient pain level will decreased to <=3/10 with ambulation and ADL's. []? Met []? Not met []? Partially met   3. Patient will improve R knee ROM by 5 deg to equal his left knee. []? Met []? Not met []? Partially met   4.  Patient TUG score will decrease by 5 sec to reduce his fall risk. []? Met []? Not met []? Partially met      Long Term Goals: To be accomplished in 12-16  treatments. 1. Patient will ambulate with less antalgic gait pattern and increased R LE WB.[]? Met []? Not met []? Partially met      2. Patient will perform sit<->Stand transfers with more ease and less knee pain and equal WB. []? Met []? Not met []? Partially met      3. Patient will be able to perform his job duties with less knee pain and discomfort. []? Met []? Not met []? Partially met      4. Patient will be able to negotiate >8 steps to get to his apartment with or without cane and less pain and knee discomfort. []? Met []? Not met []?  Partially met         PLAN  []  Upgrade activities as tolerated     []  Continue plan of care  []  Update interventions per flow sheet       []  Discharge due to:_  []  Other:_      Leela Saldaña, PT, DPT 10/18/2021

## 2021-10-25 ENCOUNTER — APPOINTMENT (OUTPATIENT)
Dept: PHYSICAL THERAPY | Age: 46
End: 2021-10-25
Payer: MEDICARE

## 2021-11-01 ENCOUNTER — APPOINTMENT (OUTPATIENT)
Dept: PHYSICAL THERAPY | Age: 46
End: 2021-11-01

## 2021-11-08 ENCOUNTER — APPOINTMENT (OUTPATIENT)
Dept: PHYSICAL THERAPY | Age: 46
End: 2021-11-08

## 2021-11-15 ENCOUNTER — APPOINTMENT (OUTPATIENT)
Dept: PHYSICAL THERAPY | Age: 46
End: 2021-11-15

## 2021-11-22 ENCOUNTER — APPOINTMENT (OUTPATIENT)
Dept: PHYSICAL THERAPY | Age: 46
End: 2021-11-22

## 2022-01-28 NOTE — PROGRESS NOTES
[address] [facility], [address] [place] [address]  Ph: [phone]  Fax: [phone]    Discharge Summary 2-15    Patient name: Beatrice Norman  : 1975  Provider#: [id number]  Referral source: Symone Marion MD      Medical/Treatment Diagnosis: Pain in right knee [M25.561]  Other chronic pain [G89.29]     Prior Hospitalization: see medical history     Comorbidities: See Plan of Care  Prior Level of Function: See Plan of Care  Medications: Verified on Patient Summary List  Start of Care: 10/11/21   Onset Date:~2021   Visits from Start of Care:2  Missed Visits: 2    Certification Period : 10/11/21 to 22    Assessment/Summary of care/GOALS:   Patient attended only 2 PT session and stooped coming due to difficulty with work schedule and transportation. Patient did not call back to reschedule PT, patient will be D/C at this time. Short Term Goals: To be accomplished in 6-8   treatments. 1. Patient will be independent with his HEP to progress with POC. []? Met []? Not met []? Partially met   2. Patient pain level will decreased to <=3/10 with ambulation and ADL's. []? Met []? Not met []? Partially met   3. Patient will improve R knee ROM by 5 deg to equal his left knee. []? Met []? Not met []? Partially met   4. Patient TUG score will decrease by 5 sec to reduce his fall risk. []? Met []? Not met []? Partially met      Long Term Goals: To be accomplished in 12-16  treatments. 1. Patient will ambulate with less antalgic gait pattern and increased R LE WB.[]? Met []? Not met []? Partially met   2. Patient will perform sit<->Stand transfers with more ease and less knee pain and equal WB. []? Met []? Not met []? Partially met   3. Patient will be able to perform his job duties with less knee pain and discomfort. []? Met []? Not met []? Partially met   4.  Patient will be able to negotiate >8 steps to get to his apartment with or without cane and less pain and knee discomfort. []? Met []? Not met []?  Partially met        [facility] Score:  54.90%   RECOMMENDATIONS:  [x]Discontinue therapy: []Patient has reached or is progressing toward set goals and is independent with HEP     []Patient is non-compliant or has abdicated     [x]Due to lack of appreciable progress towards set goals     []Other  Roxanne Gowers, PT, DPT 1/28/2022

## 2022-02-06 PROBLEM — E55.9 VITAMIN D DEFICIENCY: Status: RESOLVED | Noted: 2020-10-10 | Resolved: 2022-02-06

## 2022-02-24 ENCOUNTER — HOSPITAL ENCOUNTER (EMERGENCY)
Age: 47
Discharge: HOME | End: 2022-02-24
Attending: EMERGENCY MEDICINE
Payer: MEDICARE

## 2022-02-24 VITALS
TEMPERATURE: 99.6 F | SYSTOLIC BLOOD PRESSURE: 138 MMHG | DIASTOLIC BLOOD PRESSURE: 57 MMHG | OXYGEN SATURATION: 95 % | HEART RATE: 106 BPM | WEIGHT: 265 LBS | RESPIRATION RATE: 18 BRPM | HEIGHT: 66 IN | BODY MASS INDEX: 42.59 KG/M2

## 2022-02-24 DIAGNOSIS — U07.1 COVID-19: Primary | ICD-10-CM

## 2022-02-24 DIAGNOSIS — A08.4 VIRAL GASTROENTERITIS: ICD-10-CM

## 2022-02-24 LAB — SARS-COV-2, COV2: NORMAL

## 2022-02-24 PROCEDURE — 74011250637 HC RX REV CODE- 250/637: Performed by: EMERGENCY MEDICINE

## 2022-02-24 PROCEDURE — U0005 INFEC AGEN DETEC AMPLI PROBE: HCPCS

## 2022-02-24 PROCEDURE — 99283 EMERGENCY DEPT VISIT LOW MDM: CPT

## 2022-02-24 RX ORDER — ACETAMINOPHEN 500 MG
1000 TABLET ORAL ONCE
Status: COMPLETED | OUTPATIENT
Start: 2022-02-24 | End: 2022-02-24

## 2022-02-24 RX ORDER — ONDANSETRON 4 MG/1
4 TABLET, FILM COATED ORAL
Qty: 7 TABLET | Refills: 0 | Status: SHIPPED | OUTPATIENT
Start: 2022-02-24

## 2022-02-24 RX ADMIN — ACETAMINOPHEN 1000 MG: 500 TABLET ORAL at 14:09

## 2022-02-24 NOTE — ED PROVIDER NOTES
EMERGENCY DEPARTMENT HISTORY AND PHYSICAL EXAM      Date: 2/24/2022  Patient Name: Sylvia Lozano      History of Presenting Illness     Chief Complaint   Patient presents with    Nausea       History Provided By: Patient    HPI: Sylvia Lozano, 40 y.o. male with a past medical history significant for HTN, Obesity presents to the ED with cc of nausea, HA, diarrhea, vomiting, bodyaches. Pt w/sx starting yesterday. Few episodes NBNB emesis, lack of smell/taste and anorexia. Has had 1 episode watery diarrhea. No hematochezia. Denies cough, SOB. Endorsing whole body myalgias and HA. Denies focal weakness, numbness, tingling. Fully vaccinated and boosted against COVID, works in [employer] and wife works as HD nurse. Has PCP for f/u. Received 500mL IVF and zofran by EMS. There are no other complaints, changes, or physical findings at this time. PCP: Johnathan Cary MD    Current Facility-Administered Medications   Medication Dose Route Frequency Provider Last Rate Last Admin    acetaminophen (TYLENOL) tablet 1,000 mg  1,000 mg Oral ONCE Leah Teixeira MD         Current Outpatient Medications   Medication Sig Dispense Refill    ondansetron hcl (Zofran) 4 mg tablet Take 1 Tablet by mouth every eight (8) hours as needed for Nausea or Vomiting. 7 Tablet 0    diclofenac EC (VOLTAREN) 75 mg EC tablet Take 1 Tablet by mouth two (2) times daily as needed for Pain. Take with food. 60 Tablet 0    lisinopriL (PRINIVIL, ZESTRIL) 10 mg tablet Take 1 Tablet by mouth daily. 90 Tablet 2    metoprolol succinate (TOPROL-XL) 25 mg XL tablet Take 1 Tablet by mouth daily. 90 Tablet 2    acetaminophen (Tylenol 8 Hour) 650 mg TbER Take 1 Tablet by mouth two (2) times daily as needed for Pain. 180 Tablet 0    albuterol (PROVENTIL HFA, VENTOLIN HFA, PROAIR HFA) 90 mcg/actuation inhaler Take 2 Puffs by inhalation every six (6) hours as needed for Wheezing.  1 Inhaler 3       Past History     Past Medical History:  Past Medical History:   Diagnosis Date    Hand cramps 10/10/2020    Hypercholesterolemia     Hypertension     Impotence 10/10/2020    Increased frequency of urination 10/10/2020    Leukocytosis 10/10/2020    Migraines     Mild intermittent asthma without complication 6/75/8888    Multiple acquired skin tags 10/10/2020    Pain in both hands 10/10/2020    Paresthesia of left upper limb 10/10/2020    Seasonal allergic rhinitis 10/10/2020    Vitamin D deficiency 10/10/2020       Past Surgical History:  Past Surgical History:   Procedure Laterality Date    HX APPENDECTOMY  1993    HX PROSTATE SURGERY      HX UROLOGICAL         Family History:  Family History   Adopted: Yes   Family history unknown: Yes       Social History:  Social History     Tobacco Use    Smoking status: Never Smoker    Smokeless tobacco: Never Used   Substance Use Topics    Alcohol use: No    Drug use: Never       Allergies: Allergies   Allergen Reactions    Keflex [Cephalexin] Anaphylaxis and Shortness of Breath         Review of Systems   Constitutional: Negative except as in HPI. Eyes: Negative except as in HPI.  ENT: Negative except as in HPI. Cardiovascular: Negative except as in HPI. Respiratory: Negative except as in HPI. Gastrointestinal: Negative except as in HPI. Genitourinary: Negative except as in HPI. Musculoskeletal: Negative except as in HPI. Integumentary: Negative except as in HPI. Neurological: Negative except as in HPI. Psychiatric: Negative except as in HPI. Endocrine: Negative except as in HPI. Hematologic/Lymphatic: Negative except as in HPI. Allergic/Immunologic: Negative except as in HPI.     Physical Exam   Constitutional: Awake and alert, interactive, NAD  Eyes: PERRL, no injection or scleral icterus, no discharge  HEENT: NCAT, neck supple, MMM, no oropharyngeal exudates  CV: Slightly tachycardic, RR, no m/r/g  Respiratory: CTAB, no r/r/w  GI: Abd soft, nondistended, mildly ttp throughout, no r/g  : Deferred  MSK: FROM, no joint effusions or edema  Skin: No rashes  Neuro: CN2-12 intact, symmetric facies, fluent speech. Psych: Well-groomed, normal speech, behavior, appropriate mood    Lab and Diagnostic Study Results     Labs -   No results found for this or any previous visit (from the past 12 hour(s)). Radiologic Studies -   [unfilled]  CT Results  (Last 48 hours)    None        CXR Results  (Last 48 hours)    None          Medical Decision Making and ED Course   - I am the first and primary provider for this patient AND AM THE PRIMARY PROVIDER OF RECORD. - I reviewed the vital signs, available nursing notes, past medical history, past surgical history, family history and social history. - Initial assessment performed. The patients presenting problems have been discussed, and the staff are in agreement with the care plan formulated and outlined with them. I have encouraged them to ask questions as they arise throughout their visit. Vital Signs-Reviewed the patient's vital signs. Patient Vitals for the past 12 hrs:   Temp Pulse Resp BP SpO2   02/24/22 1402 -- (!) 106 -- -- 95 %   02/24/22 1348 99.6 °F (37.6 °C) (!) 115 18 (!) 138/57 95 %       EKG interpretation:         Provider Notes (Medical Decision Making):   46M w/viral syndrome causing gastroenteritis, likely COVID gastroenteritis. Abdomen benign, nonsurgical. Sx early in onset, unlikely to have electrolyte abnormalities <24hrs, tolerating PO, will defer lab work at this time and give zofran and tylenol for sx tx at home. SpO2 94-95% but given body habitus high risk for further deterioration despite vaccination and boosting, strict return precautions given. Pt amenable and understanding of plan, to see PCP in 1 week or return to ED if sx worsening. ED Course:              Disposition     Disposition: DC- Adult Discharges: All of the diagnostic tests were reviewed and questions answered.  Diagnosis, care plan and treatment options were discussed. The patient understands the instructions and will follow up as directed. The patients results have been reviewed with them. They have been counseled regarding their diagnosis. The patient verbally convey understanding and agreement of the signs, symptoms, diagnosis, treatment and prognosis and additionally agrees to follow up as recommended with their PCP in 24 - 48 hours. They also agree with the care-plan and convey that all of their questions have been answered. I have also put together some discharge instructions for them that include: 1) educational information regarding their diagnosis, 2) how to care for their diagnosis at home, as well a 3) list of reasons why they would want to return to the ED prior to their follow-up appointment, should their condition change. Discharged      Diagnosis     Clinical Impression:   1. COVID-19    2. Viral gastroenteritis        Attestations:     Madhuri Gunn MD

## 2022-02-24 NOTE — DISCHARGE INSTRUCTIONS
You were seen in the ER for your COVID-like symptoms. You should act as though you are positive even if your swab returns negative. Thankfully, your vital signs are currently normal, including your oxygen and your heart rate. You should take tylenol or ibuprofen for fever, aches and pains for the next few days. You can alternate these every 3 hours so that you always have something on board. For instance, you can take tylenol at 9am, ibuprofen at noon, tylenol again at 3pm and ibuprofen again at 6pm. Take in plenty of water to stay hydrated and follow up with your primary care doctor in the next few days. We also gave  you an antinausea medication called zofran (odansetron) that you can put under your tongue even if you're vomiting. Use this to help stay hydrated (eating food is less important). Please return to the ER for any uncontrollable vomiting or diarrhea, difficulty breathing, or any other new or concerning symptoms. Thank you! Thank you for allowing me to care for you in the emergency department. I sincerely hope that you are satisfied with your visit today. It is my goal to provide you with excellent care. Below you will find a list of your labs and imaging from your visit today. Should you have any questions regarding these results please do not hesitate to call the emergency department. Labs -   No results found for this or any previous visit (from the past 12 hour(s)). Radiologic Studies -   No orders to display     CT Results  (Last 48 hours)      None          CXR Results  (Last 48 hours)      None               If you feel that you have not received excellent quality care or timely care, please ask to speak to the nurse manager. Please choose us in the future for your continued health care needs.    ------------------------------------------------------------------------------------------------------------  The exam and treatment you received in the Emergency Department were for an urgent problem and are not intended as complete care. It is important that you follow-up with a doctor, nurse practitioner, or physician assistant to:  (1) confirm your diagnosis,  (2) re-evaluation of changes in your illness and treatment, and  (3) for ongoing care. If your symptoms become worse or you do not improve as expected and you are unable to reach your usual health care provider, you should return to the Emergency Department. We are available 24 hours a day. Please take your discharge instructions with you when you go to your follow-up appointment. If you have any problem arranging a follow-up appointment, contact the Emergency Department immediately. If a prescription has been provided, please have it filled as soon as possible to prevent a delay in treatment. Read the entire medication instruction sheet provided to you by the pharmacy. If you have any questions or reservations about taking the medication due to side effects or interactions with other medications, please call your primary care physician or contact the ER to speak with the charge nurse. Make an appointment with your family doctor or the physician you were referred to for follow-up of this visit as instructed on your discharge paperwork, as this is a mandatory follow-up. Return to the ER if you are unable to be seen or if you are unable to be seen in a timely manner. If you have any problem arranging the follow-up visit, contact the Emergency Department immediately.

## 2022-02-24 NOTE — Clinical Note
Temi Sebastian was seen and treated in our emergency department on 2/24/2022. Please excuse the wife of Temi Sebastian from working at her dialysis center until he has completed his 10 day quarantine on March 5, 2022.     Lisy Bryant MD

## 2022-02-24 NOTE — Clinical Note
[facility] EMERGENCY DEPT  [address] [address]  [phone]    Work/School Note    Date: 2/24/2022     To Whom It May concern:    Amy Valentino was evaluated by the following provider(s):  Attending Provider: Rhett Harris MD.   COVID19 virus is suspected. Per the CDC guidelines we recommend home isolation until the following conditions are all met:    1. At least five days have passed since symptoms first appeared and/or had a close exposure,   2. After home isolation for five days, wearing a mask around others for the next five days,  3. At least 24 have passed since last fever without the use of fever-reducing medications and  4.  Symptoms (eg cough, shortness of breath) have improved      Sincerely,          Uziel Mosqueda MD

## 2022-02-25 ENCOUNTER — APPOINTMENT (OUTPATIENT)
Dept: GENERAL RADIOLOGY | Age: 47
End: 2022-02-25
Attending: EMERGENCY MEDICINE
Payer: MEDICARE

## 2022-02-25 ENCOUNTER — HOSPITAL ENCOUNTER (EMERGENCY)
Age: 47
Discharge: HOME | End: 2022-02-25
Attending: EMERGENCY MEDICINE
Payer: MEDICARE

## 2022-02-25 ENCOUNTER — APPOINTMENT (OUTPATIENT)
Dept: CT IMAGING | Age: 47
End: 2022-02-25
Attending: EMERGENCY MEDICINE
Payer: MEDICARE

## 2022-02-25 VITALS
OXYGEN SATURATION: 95 % | HEIGHT: 67 IN | WEIGHT: 265 LBS | BODY MASS INDEX: 41.59 KG/M2 | RESPIRATION RATE: 18 BRPM | DIASTOLIC BLOOD PRESSURE: 75 MMHG | TEMPERATURE: 98.4 F | SYSTOLIC BLOOD PRESSURE: 138 MMHG | HEART RATE: 98 BPM
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
## 2022-02-25 DIAGNOSIS — T07.XXXA MULTIPLE CONTUSIONS: ICD-10-CM

## 2022-02-25 DIAGNOSIS — B34.9 VIRAL ILLNESS: ICD-10-CM

## 2022-02-25 LAB
ANION GAP SERPL CALC-SCNC: 2 MMOL/L (ref 5–15)
BASOPHILS # BLD: 0 K/UL (ref 0–0.1)
BASOPHILS NFR BLD: 0 % (ref 0–1)
BUN SERPL-MCNC: 13 MG/DL (ref 6–20)
BUN/CREAT SERPL: 15 (ref 12–20)
CA-I BLD-MCNC: 8.8 MG/DL (ref 8.5–10.1)
CHLORIDE SERPL-SCNC: 101 MMOL/L (ref 97–108)
CO2 SERPL-SCNC: 30 MMOL/L (ref 21–32)
CREAT SERPL-MCNC: 0.84 MG/DL (ref 0.7–1.3)
DIFFERENTIAL METHOD BLD: NORMAL
EOSINOPHIL # BLD: 0.1 K/UL (ref 0–0.4)
EOSINOPHIL NFR BLD: 2 % (ref 0–7)
ERYTHROCYTE [DISTWIDTH] IN BLOOD BY AUTOMATED COUNT: 12.5 % (ref 11.5–14.5)
GLUCOSE SERPL-MCNC: 104 MG/DL (ref 65–100)
HCT VFR BLD AUTO: 43.7 % (ref 36.6–50.3)
HGB BLD-MCNC: 15.9 G/DL (ref 12.1–17)
IMM GRANULOCYTES # BLD AUTO: 0 K/UL (ref 0–0.04)
IMM GRANULOCYTES NFR BLD AUTO: 0 % (ref 0–0.5)
LYMPHOCYTES # BLD: 1.5 K/UL (ref 0.8–3.5)
LYMPHOCYTES NFR BLD: 19 % (ref 12–49)
MCH RBC QN AUTO: 30.5 PG (ref 26–34)
MCHC RBC AUTO-ENTMCNC: 36.4 G/DL (ref 30–36.5)
MCV RBC AUTO: 83.7 FL (ref 80–99)
MONOCYTES # BLD: 1 K/UL (ref 0–1)
MONOCYTES NFR BLD: 13 % (ref 5–13)
NEUTS SEG # BLD: 5.2 K/UL (ref 1.8–8)
NEUTS SEG NFR BLD: 66 % (ref 32–75)
NRBC # BLD: 0 K/UL (ref 0–0.01)
NRBC BLD-RTO: 0 PER 100 WBC
PLATELET # BLD AUTO: 263 K/UL (ref 150–400)
PMV BLD AUTO: 9.5 FL (ref 8.9–12.9)
POTASSIUM SERPL-SCNC: 3.8 MMOL/L (ref 3.5–5.1)
RBC # BLD AUTO: 5.22 M/UL (ref 4.1–5.7)
SARS-COV-2, XPLCVT: NOT DETECTED
SODIUM SERPL-SCNC: 133 MMOL/L (ref 136–145)
SOURCE, COVRS: NORMAL
WBC # BLD AUTO: 7.9 K/UL (ref 4.1–11.1)

## 2022-02-25 PROCEDURE — 99284 EMERGENCY DEPT VISIT MOD MDM: CPT

## 2022-02-25 PROCEDURE — 70450 CT HEAD/BRAIN W/O DYE: CPT

## 2022-02-25 PROCEDURE — 80048 BASIC METABOLIC PNL TOTAL CA: CPT

## 2022-02-25 PROCEDURE — 74011250637 HC RX REV CODE- 250/637: Performed by: EMERGENCY MEDICINE

## 2022-02-25 PROCEDURE — 73110 X-RAY EXAM OF WRIST: CPT

## 2022-02-25 PROCEDURE — 72100 X-RAY EXAM L-S SPINE 2/3 VWS: CPT

## 2022-02-25 PROCEDURE — 36415 COLL VENOUS BLD VENIPUNCTURE: CPT

## 2022-02-25 PROCEDURE — 85025 COMPLETE CBC W/AUTO DIFF WBC: CPT

## 2022-02-25 RX ORDER — ONDANSETRON 4 MG/1
4 TABLET, ORALLY DISINTEGRATING ORAL
Qty: 12 TABLET | Refills: 0 | Status: SHIPPED | OUTPATIENT
Start: 2022-02-25 | End: 2022-03-01

## 2022-02-25 RX ORDER — ONDANSETRON 4 MG/1
4 TABLET, ORALLY DISINTEGRATING ORAL
Status: COMPLETED | OUTPATIENT
Start: 2022-02-25 | End: 2022-02-25

## 2022-02-25 RX ORDER — ACETAMINOPHEN 500 MG
1000 TABLET ORAL ONCE
Status: COMPLETED | OUTPATIENT
Start: 2022-02-25 | End: 2022-02-25

## 2022-02-25 RX ADMIN — ACETAMINOPHEN 1000 MG: 500 TABLET, FILM COATED ORAL at 10:27

## 2022-02-25 RX ADMIN — ONDANSETRON 4 MG: 4 TABLET, ORALLY DISINTEGRATING ORAL at 10:27

## 2022-02-25 NOTE — ED PROVIDER NOTES
EMERGENCY DEPARTMENT HISTORY AND PHYSICAL EXAM        Date: 2/25/2022  Patient Name: Padma Ivey    History of Presenting Illness     Chief Complaint   Patient presents with    Fall       History Provided By: Patient    HPI: Padma Ivey, 40 y.o. male history of hypertension, hyperlipidemia, and migraines presents with nausea, HA, diarrhea, vomiting, bodyaches x2 days. Today he had a ground-level fall in the bathtub. Unsure if he hit his head. No loss consciousness. He has a headache, back pain. States pain is moderate, not rating, worse movement. He feels very weak. PCP: Juliana Talavera MD    Current Outpatient Medications   Medication Sig Dispense Refill    ondansetron (ZOFRAN ODT) 4 mg disintegrating tablet Take 1 Tablet by mouth every eight (8) hours as needed for Nausea or Vomiting for up to 4 days. 12 Tablet 0    ondansetron hcl (Zofran) 4 mg tablet Take 1 Tablet by mouth every eight (8) hours as needed for Nausea or Vomiting. 7 Tablet 0    diclofenac EC (VOLTAREN) 75 mg EC tablet Take 1 Tablet by mouth two (2) times daily as needed for Pain. Take with food. 60 Tablet 0    lisinopriL (PRINIVIL, ZESTRIL) 10 mg tablet Take 1 Tablet by mouth daily. 90 Tablet 2    metoprolol succinate (TOPROL-XL) 25 mg XL tablet Take 1 Tablet by mouth daily. 90 Tablet 2    acetaminophen (Tylenol 8 Hour) 650 mg TbER Take 1 Tablet by mouth two (2) times daily as needed for Pain. 180 Tablet 0    albuterol (PROVENTIL HFA, VENTOLIN HFA, PROAIR HFA) 90 mcg/actuation inhaler Take 2 Puffs by inhalation every six (6) hours as needed for Wheezing.  1 Inhaler 3       Past History     Past Medical History:  Past Medical History:   Diagnosis Date    Hand cramps 10/10/2020    Hypercholesterolemia     Hypertension     Impotence 10/10/2020    Increased frequency of urination 10/10/2020    Leukocytosis 10/10/2020    Migraines     Mild intermittent asthma without complication 0/10/5844    Multiple acquired skin tags 10/10/2020    Pain in both hands 10/10/2020    Paresthesia of left upper limb 10/10/2020    Seasonal allergic rhinitis 10/10/2020    Vitamin D deficiency 10/10/2020       Past Surgical History:  Past Surgical History:   Procedure Laterality Date    HX APPENDECTOMY  1993    HX PROSTATE SURGERY      HX UROLOGICAL         Family History:  Family History   Adopted: Yes   Family history unknown: Yes       Social History:  Social History     Tobacco Use    Smoking status: Never Smoker    Smokeless tobacco: Never Used   Substance Use Topics    Alcohol use: No    Drug use: Never       Allergies: Allergies   Allergen Reactions    Keflex [Cephalexin] Anaphylaxis and Shortness of Breath         Review of Systems   Review of Systems   Constitutional: Negative for fever. HENT: Negative for congestion. Eyes: Negative for visual disturbance. Respiratory: Negative for shortness of breath. Cardiovascular: Negative for chest pain. Gastrointestinal: Positive for diarrhea, nausea and vomiting. Negative for abdominal pain. Genitourinary: Negative for dysuria. Musculoskeletal: Negative for arthralgias. Skin: Negative for rash. Neurological: Positive for headaches. Physical Exam   Constitutional: No acute distress. Well-nourished. Skin: No rash. ENT: No rhinorrhea. No cough. Head is normocephalic and atraumatic. Eye: No proptosis or conjunctival injections. Respiratory: No apparent respiratory distress. Gastrointestinal: Nondistended. No abdominal tenderness. Musculoskeletal: No obvious bony deformities. No tenderness to the extremities. No midline cervical spinal tenderness. Psychiatric: Cooperative. Appropriate mood and affect.     Diagnostic Study Results     Labs -     Recent Results (from the past 24 hour(s))   SARS-COV-2    Collection Time: 02/24/22  2:15 PM   Result Value Ref Range    SARS-CoV-2 Please find results under separate order     SARS-COV-2    Collection Time: 02/24/22  2:15 PM   Result Value Ref Range    Specimen source Nasopharyngeal      SARS-CoV-2 Not detected Not detected   CBC WITH AUTOMATED DIFF    Collection Time: 02/25/22 10:42 AM   Result Value Ref Range    WBC 7.9 4.1 - 11.1 K/uL    RBC 5.22 4.10 - 5.70 M/uL    HGB 15.9 12.1 - 17.0 g/dL    HCT 43.7 36.6 - 50.3 %    MCV 83.7 80.0 - 99.0 FL    MCH 30.5 26.0 - 34.0 PG    MCHC 36.4 30.0 - 36.5 g/dL    RDW 12.5 11.5 - 14.5 %    PLATELET 008 290 - 304 K/uL    MPV 9.5 8.9 - 12.9 FL    NRBC 0.0 0.0  WBC    ABSOLUTE NRBC 0.00 0.00 - 0.01 K/uL    NEUTROPHILS 66 32 - 75 %    LYMPHOCYTES 19 12 - 49 %    MONOCYTES 13 5 - 13 %    EOSINOPHILS 2 0 - 7 %    BASOPHILS 0 0 - 1 %    IMMATURE GRANULOCYTES 0 0 - 0.5 %    ABS. NEUTROPHILS 5.2 1.8 - 8.0 K/UL    ABS. LYMPHOCYTES 1.5 0.8 - 3.5 K/UL    ABS. MONOCYTES 1.0 0.0 - 1.0 K/UL    ABS. EOSINOPHILS 0.1 0.0 - 0.4 K/UL    ABS. BASOPHILS 0.0 0.0 - 0.1 K/UL    ABS. IMM. GRANS. 0.0 0.00 - 0.04 K/UL    DF AUTOMATED     METABOLIC PANEL, BASIC    Collection Time: 02/25/22 10:42 AM   Result Value Ref Range    Sodium 133 (L) 136 - 145 mmol/L    Potassium 3.8 3.5 - 5.1 mmol/L    Chloride 101 97 - 108 mmol/L    CO2 30 21 - 32 mmol/L    Anion gap 2 (L) 5 - 15 mmol/L    Glucose 104 (H) 65 - 100 mg/dL    BUN 13 6 - 20 mg/dL    Creatinine 0.84 0.70 - 1.30 mg/dL    BUN/Creatinine ratio 15 12 - 20      GFR est AA >60 >60 ml/min/1.73m2    GFR est non-AA >60 >60 ml/min/1.73m2    Calcium 8.8 8.5 - 10.1 mg/dL       Radiologic Studies -   CT HEAD WO CONT   Final Result   No change compared to CT head October 4, 2020. No intracranial   hemorrhage. XR WRIST LT AP/LAT/OBL MIN 3V   Final Result      XR SPINE LUMB 2 OR 3 V   Final Result   Mild/moderate grade thoracolumbar spondylosis. No lumbar vertebral body compression deformity or offset.         CT Results  (Last 48 hours)               02/25/22 1056  CT HEAD WO CONT Final result    Impression:  No change compared to CT head October 4, 2020. No intracranial   hemorrhage. Narrative:  CT head. Comparison CT head October 4, 2020. Axial images are reviewed along with reformatted sagittal/coronal images. Dose reduction: All CT scans at this facility are performed using dose reduction   optimization techniques as appropriate to a performed exam including the   following-   automated exposure control, adjustments of mA and/or Kv according to patient   size, or use of iterative reconstructive technique. Similar appearance for the sinuses, possible mucous retention cysts. Mastoid air   cells are normally aerated. No definite superficial radiopaque foreign material;   clinical inspection could follow. Review of intracranial content reveals preserved gray-white differentiation. No   hydrocephalus. No intracranial hemorrhage. CXR Results  (Last 48 hours)    None          Medical Decision Making and ED Course     I reviewed the available vital signs, nursing notes, past medical history, past surgical history, family history, and social history. Vital Signs - Reviewed the patient's vital signs. Patient Vitals for the past 12 hrs:   Temp Pulse Resp BP SpO2   02/25/22 0934 -- -- -- -- 95 %   02/25/22 0929 98.4 °F (36.9 °C) 98 18 138/75 95 %       Medical Decision Making:   Presented with headache, diarrhea, nausea, vomiting, fall. The differential diagnosis is viral syndrome, contusion, head injury, fracture, sprain. Patient likely has viral illness. Recommended follow-up as needed. Recommend hydration. I see no acute injuries. Feel no need for any further imaging or test.         Disposition     Discharged      DISCHARGE PLAN:  1. Current Discharge Medication List      CONTINUE these medications which have NOT CHANGED    Details   ondansetron hcl (Zofran) 4 mg tablet Take 1 Tablet by mouth every eight (8) hours as needed for Nausea or Vomiting.   Qty: 7 Tablet, Refills: 0      diclofenac EC (VOLTAREN) 75 mg EC tablet Take 1 Tablet by mouth two (2) times daily as needed for Pain. Take with food. Qty: 60 Tablet, Refills: 0      lisinopriL (PRINIVIL, ZESTRIL) 10 mg tablet Take 1 Tablet by mouth daily. Qty: 90 Tablet, Refills: 2    Associated Diagnoses: Essential hypertension      metoprolol succinate (TOPROL-XL) 25 mg XL tablet Take 1 Tablet by mouth daily. Qty: 90 Tablet, Refills: 2      acetaminophen (Tylenol 8 Hour) 650 mg TbER Take 1 Tablet by mouth two (2) times daily as needed for Pain. Qty: 180 Tablet, Refills: 0      albuterol (PROVENTIL HFA, VENTOLIN HFA, PROAIR HFA) 90 mcg/actuation inhaler Take 2 Puffs by inhalation every six (6) hours as needed for Wheezing. Qty: 1 Inhaler, Refills: 3           2. Follow-up Information     Follow up With Specialties Details Why [facility] EMERGENCY DEPT Emergency Medicine Go today As soon as possible if symptoms worsen [address] [place] [address]  [phone]    Primary care doctor  Schedule an appointment as soon as possible for a visit in 3 days          3. Return to ED if worse     Diagnosis     Clinical impression:   1. Fall, initial encounter    2. Multiple contusions    3. Viral illness           Attestation:  Please note that this dictation was completed with [employer], the [employer] voice recognition software. Quite often unanticipated grammatical, syntax, homophones, and other interpretive errors are inadvertently transcribed by the computer software. Please disregard these errors. Please excuse any errors that have escaped final proofreading. Thank you.   Wei Montaño, DO

## 2022-02-25 NOTE — ED TRIAGE NOTES
Patient fell in bathroom, unsure if he hit his head or not. Complains of headache and back pain 9/10. Was just seen in ER yesterday and covid tested.

## 2022-02-25 NOTE — DISCHARGE INSTRUCTIONS
Thank you! Thank you for allowing me to care for you in the emergency department. I sincerely hope that you are satisfied with your visit today. It is my goal to provide you with excellent care. Below you will find a list of your labs and imaging from your visit today. Should you have any questions regarding these results please do not hesitate to call the emergency department. Labs -     Recent Results (from the past 12 hour(s))   CBC WITH AUTOMATED DIFF    Collection Time: 02/25/22 10:42 AM   Result Value Ref Range    WBC 7.9 4.1 - 11.1 K/uL    RBC 5.22 4.10 - 5.70 M/uL    HGB 15.9 12.1 - 17.0 g/dL    HCT 43.7 36.6 - 50.3 %    MCV 83.7 80.0 - 99.0 FL    MCH 30.5 26.0 - 34.0 PG    MCHC 36.4 30.0 - 36.5 g/dL    RDW 12.5 11.5 - 14.5 %    PLATELET 726 147 - 362 K/uL    MPV 9.5 8.9 - 12.9 FL    NRBC 0.0 0.0  WBC    ABSOLUTE NRBC 0.00 0.00 - 0.01 K/uL    NEUTROPHILS 66 32 - 75 %    LYMPHOCYTES 19 12 - 49 %    MONOCYTES 13 5 - 13 %    EOSINOPHILS 2 0 - 7 %    BASOPHILS 0 0 - 1 %    IMMATURE GRANULOCYTES 0 0 - 0.5 %    ABS. NEUTROPHILS 5.2 1.8 - 8.0 K/UL    ABS. LYMPHOCYTES 1.5 0.8 - 3.5 K/UL    ABS. MONOCYTES 1.0 0.0 - 1.0 K/UL    ABS. EOSINOPHILS 0.1 0.0 - 0.4 K/UL    ABS. BASOPHILS 0.0 0.0 - 0.1 K/UL    ABS. IMM. GRANS. 0.0 0.00 - 0.04 K/UL    DF AUTOMATED     METABOLIC PANEL, BASIC    Collection Time: 02/25/22 10:42 AM   Result Value Ref Range    Sodium 133 (L) 136 - 145 mmol/L    Potassium 3.8 3.5 - 5.1 mmol/L    Chloride 101 97 - 108 mmol/L    CO2 30 21 - 32 mmol/L    Anion gap 2 (L) 5 - 15 mmol/L    Glucose 104 (H) 65 - 100 mg/dL    BUN 13 6 - 20 mg/dL    Creatinine 0.84 0.70 - 1.30 mg/dL    BUN/Creatinine ratio 15 12 - 20      GFR est AA >60 >60 ml/min/1.73m2    GFR est non-AA >60 >60 ml/min/1.73m2    Calcium 8.8 8.5 - 10.1 mg/dL       Radiologic Studies -   CT HEAD WO CONT   Final Result   No change compared to CT head October 4, 2020. No intracranial   hemorrhage.             XR WRIST LT AP/LAT/OBL MIN 3V   Final Result      XR SPINE LUMB 2 OR 3 V   Final Result   Mild/moderate grade thoracolumbar spondylosis. No lumbar vertebral body compression deformity or offset. CT Results  (Last 48 hours)                 02/25/22 1056  CT HEAD WO CONT Final result    Impression:  No change compared to CT head October 4, 2020. No intracranial   hemorrhage. Narrative:  CT head. Comparison CT head October 4, 2020. Axial images are reviewed along with reformatted sagittal/coronal images. Dose reduction: All CT scans at this facility are performed using dose reduction   optimization techniques as appropriate to a performed exam including the   following-   automated exposure control, adjustments of mA and/or Kv according to patient   size, or use of iterative reconstructive technique. Similar appearance for the sinuses, possible mucous retention cysts. Mastoid air   cells are normally aerated. No definite superficial radiopaque foreign material;   clinical inspection could follow. Review of intracranial content reveals preserved gray-white differentiation. No   hydrocephalus. No intracranial hemorrhage. CXR Results  (Last 48 hours)      None               If you feel that you have not received excellent quality care or timely care, please ask to speak to the nurse manager. Please choose us in the future for your continued health care needs. ------------------------------------------------------------------------------------------------------------  The exam and treatment you received in the Emergency Department were for an urgent problem and are not intended as complete care. It is important that you follow-up with a doctor, nurse practitioner, or physician assistant to:  (1) confirm your diagnosis,  (2) re-evaluation of changes in your illness and treatment, and  (3) for ongoing care.   If your symptoms become worse or you do not improve as expected and you are unable to reach your usual health care provider, you should return to the Emergency Department. We are available 24 hours a day. Please take your discharge instructions with you when you go to your follow-up appointment. If you have any problem arranging a follow-up appointment, contact the Emergency Department immediately. If a prescription has been provided, please have it filled as soon as possible to prevent a delay in treatment. Read the entire medication instruction sheet provided to you by the pharmacy. If you have any questions or reservations about taking the medication due to side effects or interactions with other medications, please call your primary care physician or contact the ER to speak with the charge nurse. Make an appointment with your family doctor or the physician you were referred to for follow-up of this visit as instructed on your discharge paperwork, as this is a mandatory follow-up. Return to the ER if you are unable to be seen or if you are unable to be seen in a timely manner. If you have any problem arranging the follow-up visit, contact the Emergency Department immediately.

## 2022-03-10 ENCOUNTER — PATIENT OUTREACH (OUTPATIENT)
Dept: CASE MANAGEMENT | Age: 47
End: 2022-03-10

## 2022-03-18 PROBLEM — D72.829 LEUKOCYTOSIS: Status: ACTIVE | Noted: 2020-10-10

## 2022-03-18 PROBLEM — G51.0 BELL'S PALSY: Status: ACTIVE | Noted: 2020-10-12

## 2022-03-18 PROBLEM — G89.29 CHRONIC PAIN OF RIGHT KNEE: Status: ACTIVE | Noted: 2021-09-16

## 2022-03-18 PROBLEM — M79.642 PAIN IN BOTH HANDS: Status: ACTIVE | Noted: 2020-10-10

## 2022-03-18 PROBLEM — M25.561 CHRONIC PAIN OF RIGHT KNEE: Status: ACTIVE | Noted: 2021-09-16

## 2022-03-18 PROBLEM — M79.641 PAIN IN BOTH HANDS: Status: ACTIVE | Noted: 2020-10-10

## 2022-03-19 PROBLEM — J45.20 MILD INTERMITTENT ASTHMA WITHOUT COMPLICATION: Status: ACTIVE | Noted: 2021-01-18

## 2022-03-19 PROBLEM — R20.2 PARESTHESIA OF LEFT UPPER LIMB: Status: ACTIVE | Noted: 2020-10-10

## 2022-03-19 PROBLEM — J30.2 SEASONAL ALLERGIC RHINITIS: Status: ACTIVE | Noted: 2020-10-10

## 2022-03-19 PROBLEM — Z86.69 HX OF BELL'S PALSY: Status: ACTIVE | Noted: 2020-10-12

## 2022-03-19 PROBLEM — N52.9 IMPOTENCE: Status: ACTIVE | Noted: 2020-10-10

## 2022-03-19 PROBLEM — E78.5 DYSLIPIDEMIA: Status: ACTIVE | Noted: 2021-09-12

## 2022-03-19 PROBLEM — K21.9 GASTROESOPHAGEAL REFLUX DISEASE WITHOUT ESOPHAGITIS: Status: ACTIVE | Noted: 2021-01-18

## 2022-03-19 PROBLEM — L91.8 MULTIPLE ACQUIRED SKIN TAGS: Status: ACTIVE | Noted: 2020-10-10

## 2022-03-19 PROBLEM — E66.01 OBESITY, MORBID (HCC): Status: ACTIVE | Noted: 2020-10-12

## 2022-03-20 PROBLEM — R35.0 INCREASED FREQUENCY OF URINATION: Status: ACTIVE | Noted: 2020-10-10

## 2022-03-20 PROBLEM — R25.2 HAND CRAMPS: Status: ACTIVE | Noted: 2020-10-10

## 2022-03-22 ENCOUNTER — TELEPHONE (OUTPATIENT)
Dept: INTERNAL MEDICINE CLINIC | Age: 47
End: 2022-03-22

## 2022-03-22 ENCOUNTER — HOSPITAL ENCOUNTER (EMERGENCY)
Age: 47
Discharge: HOME | End: 2022-03-22
Payer: MEDICARE

## 2022-03-22 ENCOUNTER — APPOINTMENT (OUTPATIENT)
Dept: GENERAL RADIOLOGY | Age: 47
End: 2022-03-22
Attending: NURSE PRACTITIONER
Payer: MEDICARE

## 2022-03-22 ENCOUNTER — APPOINTMENT (OUTPATIENT)
Dept: CT IMAGING | Age: 47
End: 2022-03-22
Attending: NURSE PRACTITIONER
Payer: MEDICARE

## 2022-03-22 VITALS
HEART RATE: 80 BPM | RESPIRATION RATE: 17 BRPM | SYSTOLIC BLOOD PRESSURE: 134 MMHG | WEIGHT: 275 LBS | HEIGHT: 67 IN | OXYGEN SATURATION: 98 % | BODY MASS INDEX: 43.16 KG/M2 | TEMPERATURE: 98.1 F | DIASTOLIC BLOOD PRESSURE: 76 MMHG

## 2022-03-22 DIAGNOSIS — M54.9 BACK PAIN, UNSPECIFIED BACK LOCATION, UNSPECIFIED BACK PAIN LATERALITY, UNSPECIFIED CHRONICITY: Primary | ICD-10-CM

## 2022-03-22 DIAGNOSIS — N28.1 RENAL CYST, LEFT: ICD-10-CM

## 2022-03-22 DIAGNOSIS — K42.9 UMBILICAL HERNIA WITHOUT OBSTRUCTION AND WITHOUT GANGRENE: ICD-10-CM

## 2022-03-22 LAB
ALBUMIN SERPL-MCNC: 4.1 G/DL (ref 3.5–5)
ALBUMIN/GLOB SERPL: 1.2 {RATIO} (ref 1.1–2.2)
ALP SERPL-CCNC: 94 U/L (ref 45–117)
ALT SERPL-CCNC: 34 U/L (ref 12–78)
ANION GAP SERPL CALC-SCNC: 3 MMOL/L (ref 5–15)
APPEARANCE UR: CLEAR
AST SERPL W P-5'-P-CCNC: 22 U/L (ref 15–37)
BACTERIA URNS QL MICRO: NEGATIVE /HPF
BASOPHILS # BLD: 0.1 K/UL (ref 0–0.1)
BASOPHILS NFR BLD: 1 % (ref 0–1)
BILIRUB DIRECT SERPL-MCNC: <0.1 MG/DL (ref 0–0.2)
BILIRUB SERPL-MCNC: 0.2 MG/DL (ref 0.2–1)
BILIRUB UR QL: NEGATIVE
BUN SERPL-MCNC: 11 MG/DL (ref 6–20)
BUN/CREAT SERPL: 14 (ref 12–20)
CA-I BLD-MCNC: 9.3 MG/DL (ref 8.5–10.1)
CHLORIDE SERPL-SCNC: 105 MMOL/L (ref 97–108)
CO2 SERPL-SCNC: 28 MMOL/L (ref 21–32)
COLOR UR: ABNORMAL
CREAT SERPL-MCNC: 0.81 MG/DL (ref 0.7–1.3)
DIFFERENTIAL METHOD BLD: NORMAL
EOSINOPHIL # BLD: 0.4 K/UL (ref 0–0.4)
EOSINOPHIL NFR BLD: 4 % (ref 0–7)
ERYTHROCYTE [DISTWIDTH] IN BLOOD BY AUTOMATED COUNT: 12.7 % (ref 11.5–14.5)
GLOBULIN SER CALC-MCNC: 3.3 G/DL (ref 2–4)
GLUCOSE SERPL-MCNC: 106 MG/DL (ref 65–100)
GLUCOSE UR STRIP.AUTO-MCNC: NEGATIVE MG/DL
HCT VFR BLD AUTO: 43.5 % (ref 36.6–50.3)
HGB BLD-MCNC: 15.7 G/DL (ref 12.1–17)
HGB UR QL STRIP: NEGATIVE
IMM GRANULOCYTES # BLD AUTO: 0 K/UL (ref 0–0.04)
IMM GRANULOCYTES NFR BLD AUTO: 0 % (ref 0–0.5)
KETONES UR QL STRIP.AUTO: NEGATIVE MG/DL
LEUKOCYTE ESTERASE UR QL STRIP.AUTO: NEGATIVE
LYMPHOCYTES # BLD: 2.4 K/UL (ref 0.8–3.5)
LYMPHOCYTES NFR BLD: 28 % (ref 12–49)
MCH RBC QN AUTO: 30.3 PG (ref 26–34)
MCHC RBC AUTO-ENTMCNC: 36.1 G/DL (ref 30–36.5)
MCV RBC AUTO: 83.8 FL (ref 80–99)
MONOCYTES # BLD: 1 K/UL (ref 0–1)
MONOCYTES NFR BLD: 11 % (ref 5–13)
MUCOUS THREADS URNS QL MICRO: ABNORMAL /LPF
NEUTS SEG # BLD: 5 K/UL (ref 1.8–8)
NEUTS SEG NFR BLD: 56 % (ref 32–75)
NITRITE UR QL STRIP.AUTO: NEGATIVE
NRBC # BLD: 0 K/UL (ref 0–0.01)
NRBC BLD-RTO: 0 PER 100 WBC
PH UR STRIP: 7 [PH] (ref 5–8)
PLATELET # BLD AUTO: 327 K/UL (ref 150–400)
PMV BLD AUTO: 9.1 FL (ref 8.9–12.9)
POTASSIUM SERPL-SCNC: 4.4 MMOL/L (ref 3.5–5.1)
PROT SERPL-MCNC: 7.4 G/DL (ref 6.4–8.2)
PROT UR STRIP-MCNC: NEGATIVE MG/DL
RBC # BLD AUTO: 5.19 M/UL (ref 4.1–5.7)
RBC #/AREA URNS HPF: ABNORMAL /HPF (ref 0–5)
SODIUM SERPL-SCNC: 136 MMOL/L (ref 136–145)
SP GR UR REFRACTOMETRY: 1.02 (ref 1–1.03)
UROBILINOGEN UR QL STRIP.AUTO: 2 EU/DL (ref 0.1–1)
WBC # BLD AUTO: 8.8 K/UL (ref 4.1–11.1)
WBC URNS QL MICRO: ABNORMAL /HPF (ref 0–4)

## 2022-03-22 PROCEDURE — 80076 HEPATIC FUNCTION PANEL: CPT

## 2022-03-22 PROCEDURE — 72100 X-RAY EXAM L-S SPINE 2/3 VWS: CPT

## 2022-03-22 PROCEDURE — 80048 BASIC METABOLIC PNL TOTAL CA: CPT

## 2022-03-22 PROCEDURE — 74176 CT ABD & PELVIS W/O CONTRAST: CPT

## 2022-03-22 PROCEDURE — 96374 THER/PROPH/DIAG INJ IV PUSH: CPT

## 2022-03-22 PROCEDURE — 96375 TX/PRO/DX INJ NEW DRUG ADDON: CPT

## 2022-03-22 PROCEDURE — 74011250636 HC RX REV CODE- 250/636: Performed by: NURSE PRACTITIONER

## 2022-03-22 PROCEDURE — 81001 URINALYSIS AUTO W/SCOPE: CPT

## 2022-03-22 PROCEDURE — 85025 COMPLETE CBC W/AUTO DIFF WBC: CPT

## 2022-03-22 PROCEDURE — 99284 EMERGENCY DEPT VISIT MOD MDM: CPT

## 2022-03-22 RX ORDER — KETOROLAC TROMETHAMINE 10 MG/1
10 TABLET, FILM COATED ORAL
Qty: 20 TABLET | Refills: 0 | Status: SHIPPED | OUTPATIENT
Start: 2022-03-22 | End: 2022-03-27

## 2022-03-22 RX ORDER — KETOROLAC TROMETHAMINE 30 MG/ML
30 INJECTION, SOLUTION INTRAMUSCULAR; INTRAVENOUS
Status: COMPLETED | OUTPATIENT
Start: 2022-03-22 | End: 2022-03-22

## 2022-03-22 RX ORDER — MORPHINE SULFATE 4 MG/ML
4 INJECTION INTRAVENOUS ONCE
Status: COMPLETED | OUTPATIENT
Start: 2022-03-22 | End: 2022-03-22

## 2022-03-22 RX ORDER — ONDANSETRON 2 MG/ML
4 INJECTION INTRAMUSCULAR; INTRAVENOUS
Status: COMPLETED | OUTPATIENT
Start: 2022-03-22 | End: 2022-03-22

## 2022-03-22 RX ADMIN — MORPHINE SULFATE 4 MG: 4 INJECTION INTRAVENOUS at 12:51

## 2022-03-22 RX ADMIN — KETOROLAC TROMETHAMINE 30 MG: 30 INJECTION, SOLUTION INTRAMUSCULAR; INTRAVENOUS at 12:18

## 2022-03-22 RX ADMIN — SODIUM CHLORIDE 1000 ML: 9 INJECTION, SOLUTION INTRAVENOUS at 12:20

## 2022-03-22 RX ADMIN — ONDANSETRON 4 MG: 2 INJECTION INTRAMUSCULAR; INTRAVENOUS at 12:18

## 2022-03-22 NOTE — Clinical Note
[facility]ERGENCY DEPT  [address] [place] [id number]  [phone]    Work/School Note    Date: 3/22/2022    To Whom It May concern:    Gomez Simmonds was seen and treated today in the emergency room by the following provider(s):  Nurse Practitioner: Haley Mackenzie NP.      Gomez Simmonds is excused from work/school on 3/22/2022 through 3/24/2022. He is medically clear to return to work/school on 3/25/2022.          Sincerely,          Todd Moore NP

## 2022-03-22 NOTE — TELEPHONE ENCOUNTER
----- Message from Ja Chen sent at 3/22/2022  3:06 PM EDT -----  Subject: Appointment Request    Reason for Call: Routine Hospital Follow Up    QUESTIONS  Type of Appointment? Established Patient  Reason for appointment request? No appointments available during search  Additional Information for Provider? Pt visited [facility] on 3/22/22 and [facility] on 3/22/22 for an x ray on kidneys. Pt stated   he has paperwork showing he has cysts on his kidneys and a problem with a   possible hernia in navel. Pt would like an appt this week if possible, but   I didn't see any availability at all. Please advise and call pt back. ---------------------------------------------------------------------------  --------------  Omelia Counter INFO  What is the best way for the office to contact you? OK to leave message on   voicemail  Preferred Call Back Phone Number? [phone]  ---------------------------------------------------------------------------  --------------  SCRIPT ANSWERS  Relationship to Patient? Self  (Patient requests to see provider urgently. )? No  (Has the patient been discharged from the hospital within 2 business days   AND does not have a Telephone Encounter  Follow Up From [address] [place] [address]   documented in [address]?)? No  Do you have any questions for your primary care provider that need to be   answered prior to your appointment? (Use RN Triage if question pertains to   anything on the red flag list)? No  (Patient needs follow up visit after hospital discharge) Book first   available appointment within 7 days OF DISCHARGE, if no appt, proceed to   book the next available time slot within 14 days OF DISCHARGE AND Send   Message to Provider. [address] Follow Up appointment cannot be booked   beyond 14 Days and should result in a Message to Provider. ? Yes   Have you been diagnosed with, awaiting test results for, or told that you   are suspected of having COVID-19 (Coronavirus)?  (If patient has tested negative or was tested as a requirement for work, school, or travel and   not based on symptoms, answer no)? No  Within the past 10 days have you developed any of the following symptoms   (answer no if symptoms have been present longer than 10 days or began   more than 10 days ago)? Fever or Chills, Cough, Shortness of breath or   difficulty breathing, Loss of taste or smell, Sore throat, Nasal   congestion, Sneezing or runny nose, Fatigue or generalized body aches   (answer no if pain is specific to a body part e.g. back pain), Diarrhea,   Headache? No  Have you had close contact with someone with COVID-19 in the last 7 days? No  (Service Expert  click yes below to proceed with [facility]ual   Scheduling)?  Yes

## 2022-03-22 NOTE — ED TRIAGE NOTES
Pt arrives with lower back pain that began Friday. Denies any bowel/bladder incontinence. Tried OTC medications. Reports some dysuria/ foul smelling urine.

## 2022-03-22 NOTE — Clinical Note
[facility] EMERGENCY DEPT  [facility] [place] [address]  [phone]    Work/School Note    Date: 3/22/2022    To Whom It May concern:    Hayden Aguilera was seen and treated today in the emergency room by the following provider(s):  Nurse Practitioner: Xiomara Hill NP.      Hayden Aguilera is excused from work/school on 3/22/2022 through 3/24/2022. He is medically clear to return to work/school on 3/25/2022. No lifting over 10 lbs until released by primary care or surgeon.  +umbilical hernia     Sincerely,          Vicky Sylvester NP

## 2022-03-22 NOTE — ED PROVIDER NOTES
EMERGENCY DEPARTMENT HISTORY AND PHYSICAL EXAM      Date: 3/22/2022  Patient Name: Serjio Lee      History of Presenting Illness     Chief Complaint   Patient presents with    Back Pain    Urinary Pain       History Provided By: Patient    HPI: Serjio Lee, 40 y.o. male with a past medical history significant Vitamin D deficiency, allergic rhinitis, paresthesias, asthma, migraines, htn, hypercholesterolemia,  presents to the ED with cc of lower back pain, pain extends fully across lower back. Sharp, hard to get comfortable. Pt reports sometimes pain does come to left flank pain and down into groin. There are no other complaints, changes, or physical findings at this time. PCP: Leonel Lizarraga MD    Current Outpatient Medications   Medication Sig Dispense Refill    ketorolac (TORADOL) 10 mg tablet Take 1 Tablet by mouth every six (6) hours as needed for Pain for up to 5 days. 20 Tablet 0    azelastine (ASTELIN) 137 mcg (0.1 %) nasal spray USE 2 SPRAYS IN EACH NOSTRIL ONE TIME DAILY 1 Each 2    ondansetron hcl (Zofran) 4 mg tablet Take 1 Tablet by mouth every eight (8) hours as needed for Nausea or Vomiting. 7 Tablet 0    diclofenac EC (VOLTAREN) 75 mg EC tablet Take 1 Tablet by mouth two (2) times daily as needed for Pain. Take with food. 60 Tablet 0    lisinopriL (PRINIVIL, ZESTRIL) 10 mg tablet Take 1 Tablet by mouth daily. 90 Tablet 2    metoprolol succinate (TOPROL-XL) 25 mg XL tablet Take 1 Tablet by mouth daily. 90 Tablet 2    acetaminophen (Tylenol 8 Hour) 650 mg TbER Take 1 Tablet by mouth two (2) times daily as needed for Pain. 180 Tablet 0    albuterol (PROVENTIL HFA, VENTOLIN HFA, PROAIR HFA) 90 mcg/actuation inhaler Take 2 Puffs by inhalation every six (6) hours as needed for Wheezing.  1 Inhaler 3       Past History     Past Medical History:  Past Medical History:   Diagnosis Date    Hand cramps 10/10/2020    Hypercholesterolemia     Hypertension     Impotence 10/10/2020    Increased frequency of urination 10/10/2020    Leukocytosis 10/10/2020    Migraines     Mild intermittent asthma without complication 6/18/2806    Multiple acquired skin tags 10/10/2020    Pain in both hands 10/10/2020    Paresthesia of left upper limb 10/10/2020    Seasonal allergic rhinitis 10/10/2020    Vitamin D deficiency 10/10/2020       Past Surgical History:  Past Surgical History:   Procedure Laterality Date    HX APPENDECTOMY  1993    HX PROSTATE SURGERY      HX UROLOGICAL         Family History:  Family History   Adopted: Yes   Family history unknown: Yes       Social History:  Social History     Tobacco Use    Smoking status: Never Smoker    Smokeless tobacco: Never Used   Substance Use Topics    Alcohol use: No    Drug use: Never       Allergies: Allergies   Allergen Reactions    Keflex [Cephalexin] Anaphylaxis and Shortness of Breath         Review of Systems     Review of Systems   Constitutional: Negative. Negative for fever. HENT: Negative. Negative for ear pain, rhinorrhea, sinus pain and sore throat. Respiratory: Negative. Cardiovascular: Negative. Gastrointestinal: Negative. Negative for abdominal pain. Genitourinary: Positive for dysuria and flank pain. Negative for hematuria and urgency. Musculoskeletal: Positive for back pain (lumbar back). Skin: Negative. Neurological: Negative. Psychiatric/Behavioral: The patient is nervous/anxious. Physical Exam     Physical Exam  Vitals and nursing note reviewed. Constitutional:       Appearance: Normal appearance. He is normal weight. HENT:      Head: Normocephalic and atraumatic. Right Ear: Tympanic membrane normal.      Left Ear: Tympanic membrane normal.      Mouth/Throat:      Mouth: Mucous membranes are moist.   Eyes:      Extraocular Movements: Extraocular movements intact. Pupils: Pupils are equal, round, and reactive to light.    Cardiovascular:      Rate and Rhythm: Normal rate and regular rhythm. Pulses: Normal pulses. Heart sounds: Normal heart sounds. Pulmonary:      Effort: Pulmonary effort is normal. No respiratory distress. Breath sounds: Normal breath sounds. No wheezing. Abdominal:      General: Abdomen is flat. There is no distension. Tenderness: There is no abdominal tenderness. There is no guarding. Musculoskeletal:         General: No swelling, tenderness, deformity or signs of injury. Normal range of motion. Right lower leg: No edema. Left lower leg: No edema. Skin:     General: Skin is warm and dry. Capillary Refill: Capillary refill takes less than 2 seconds. Neurological:      General: No focal deficit present. Mental Status: He is alert and oriented to person, place, and time. Psychiatric:         Mood and Affect: Mood normal.         Behavior: Behavior normal.         Lab and Diagnostic Study Results     Labs -     Recent Results (from the past 12 hour(s))   URINALYSIS W/MICROSCOPIC    Collection Time: 03/22/22 11:38 AM   Result Value Ref Range    Color Yellow/Straw      Appearance Clear Clear      Specific gravity 1.024 1.003 - 1.030      pH (UA) 7.0 5.0 - 8.0      Protein Negative Negative mg/dL    Glucose Negative Negative mg/dL    Ketone Negative Negative mg/dL    Bilirubin Negative Negative      Blood Negative Negative      Urobilinogen 2.0 (H) 0.1 - 1.0 EU/dL    Nitrites Negative Negative      Leukocyte Esterase Negative Negative      WBC 0-4 0 - 4 /hpf    RBC 0-5 0 - 5 /hpf    Bacteria Negative Negative /hpf    Mucus 1+ /lpf   CBC WITH AUTOMATED DIFF    Collection Time: 03/22/22 12:15 PM   Result Value Ref Range    WBC 8.8 4.1 - 11.1 K/uL    RBC 5.19 4. 10 - 5.70 M/uL    HGB 15.7 12.1 - 17.0 g/dL    HCT 43.5 36.6 - 50.3 %    MCV 83.8 80.0 - 99.0 FL    MCH 30.3 26.0 - 34.0 PG    MCHC 36.1 30.0 - 36.5 g/dL    RDW 12.7 11.5 - 14.5 %    PLATELET 199 384 - 590 K/uL    MPV 9.1 8.9 - 12.9 FL    NRBC 0.0 0.0  WBC    ABSOLUTE NRBC 0.00 0.00 - 0.01 K/uL    NEUTROPHILS 56 32 - 75 %    LYMPHOCYTES 28 12 - 49 %    MONOCYTES 11 5 - 13 %    EOSINOPHILS 4 0 - 7 %    BASOPHILS 1 0 - 1 %    IMMATURE GRANULOCYTES 0 0 - 0.5 %    ABS. NEUTROPHILS 5.0 1.8 - 8.0 K/UL    ABS. LYMPHOCYTES 2.4 0.8 - 3.5 K/UL    ABS. MONOCYTES 1.0 0.0 - 1.0 K/UL    ABS. EOSINOPHILS 0.4 0.0 - 0.4 K/UL    ABS. BASOPHILS 0.1 0.0 - 0.1 K/UL    ABS. IMM. GRANS. 0.0 0.00 - 0.04 K/UL    DF AUTOMATED     METABOLIC PANEL, BASIC    Collection Time: 03/22/22 12:15 PM   Result Value Ref Range    Sodium 136 136 - 145 mmol/L    Potassium 4.4 3.5 - 5.1 mmol/L    Chloride 105 97 - 108 mmol/L    CO2 28 21 - 32 mmol/L    Anion gap 3 (L) 5 - 15 mmol/L    Glucose 106 (H) 65 - 100 mg/dL    BUN 11 6 - 20 mg/dL    Creatinine 0.81 0.70 - 1.30 mg/dL    BUN/Creatinine ratio 14 12 - 20      GFR est AA >60 >60 ml/min/1.73m2    GFR est non-AA >60 >60 ml/min/1.73m2    Calcium 9.3 8.5 - 10.1 mg/dL   HEPATIC FUNCTION PANEL    Collection Time: 03/22/22 12:15 PM   Result Value Ref Range    Protein, total 7.4 6.4 - 8.2 g/dL    Albumin 4.1 3.5 - 5.0 g/dL    Globulin 3.3 2.0 - 4.0 g/dL    A-G Ratio 1.2 1.1 - 2.2      Bilirubin, total 0.2 0.2 - 1.0 mg/dL    Bilirubin, direct <0.1 0.0 - 0.2 mg/dL    Alk. phosphatase 94 45 - 117 U/L    AST (SGOT) 22 15 - 37 U/L    ALT (SGPT) 34 12 - 78 U/L       Radiologic Studies -   [unfilled]  CT Results  (Last 48 hours)               03/22/22 1311  CT ABD PELV WO CONT Final result    Impression:  No calcified stone either kidney. No hydronephrosis. Similar unchanged oblong 3 cm hypodense structure mid pole left kidney, likely   cyst.       Narrative:  CT abdomen and pelvis without IV contrast.       Comparison CT abdomen and pelvis October 4, 2020. Axial images are reviewed along with reformatted sagittal/coronal images. No IV   contrast administered.     Dose reduction: All CT scans at this facility are performed using dose reduction   optimization techniques as appropriate to a performed exam including the   following-   automated exposure control, adjustments of mA and/or Kv according to patient   size, or use of iterative reconstructive technique. Lung bases are clear. Nonenhanced images demonstrate unchanged small relative demarcated hypodense   sites through the liver, possible cysts. Gallbladder unremarkable. Normal volume   spleen. Pancreas and bilateral adrenal glands appear unremarkable. Right kidney appears unremarkable on this nonenhanced study. Unchanged   appearance left kidney. Haines 3 cm likely cyst noted left kidney (HU less than   3). No hydronephrosis. Stomach and small bowel loops are decompressed. Stool and air through colon. No   CT evidence for appendicitis or diverticulitis. No ascites. Normal caliber abdominal aorta. Small 3 cm x 3 cm fat-containing periumbilical hernia. CXR Results  (Last 48 hours)    None          Medical Decision Making and ED Course   - I am the first and primary provider for this patient AND AM THE PRIMARY PROVIDER OF RECORD. - I reviewed the vital signs, available nursing notes, past medical history, past surgical history, family history and social history. - Initial assessment performed. The patients presenting problems have been discussed, and the staff are in agreement with the care plan formulated and outlined with them. I have encouraged them to ask questions as they arise throughout their visit. Vital Signs-Reviewed the patient's vital signs.     Patient Vitals for the past 12 hrs:   Temp Pulse Resp BP SpO2   03/22/22 1401 -- 81 20 124/79 96 %   03/22/22 1249 -- 79 16 130/82 95 %   03/22/22 1130 98.1 °F (36.7 °C) 80 18 129/86 95 %         Records Reviewed: Nursing Notes    The patient presents with back pain with a differential diagnosis of  kidney stone and lumbar strain    ED Course:              Provider Notes (Medical Decision Making):     MDM  Number of Diagnoses or Management Options  Back pain, unspecified back location, unspecified back pain laterality, unspecified chronicity: new, needed workup  Renal cyst, left: new, needed workup  Umbilical hernia without obstruction and without gangrene: new, needed workup     Amount and/or Complexity of Data Reviewed  Clinical lab tests: ordered and reviewed  Tests in the radiology section of CPT®: ordered and reviewed    Risk of Complications, Morbidity, and/or Mortality  Presenting problems: moderate  Diagnostic procedures: moderate  Management options: moderate  General comments: IVF, medication toradol and morphine for pain. Reviewed labs and CT findings with pt . Pt to follow up with pcp on renal cyst and fat umbilical hernia. Patient Progress  Patient progress: stable             Consultations:       Consultations: - NONE        Procedures and Critical Care       Performed by: Jori Mora NP  PROCEDURES: none  Procedures                 Jori Mora NP        Disposition     Disposition: Condition improved  DC- Adult Discharges: All of the diagnostic tests were reviewed and questions answered. Diagnosis, care plan and treatment options were discussed. The patient understands the instructions and will follow up as directed. The patients results have been reviewed with them. They have been counseled regarding their diagnosis. The patient verbally convey understanding and agreement of the signs, symptoms, diagnosis, treatment and prognosis and additionally agrees to follow up as recommended with their PCP in 24 - 48 hours. They also agree with the care-plan and convey that all of their questions have been answered.   I have also put together some discharge instructions for them that include: 1) educational information regarding their diagnosis, 2) how to care for their diagnosis at home, as well a 3) list of reasons why they would want to return to the ED prior to their follow-up appointment, should their condition change. DC-The patient was given verbal follow-up instructions    Discharged    Remove if not discharged  DISCHARGE PLAN:  1. Current Discharge Medication List      CONTINUE these medications which have NOT CHANGED    Details   azelastine (ASTELIN) 137 mcg (0.1 %) nasal spray USE 2 SPRAYS IN EACH NOSTRIL ONE TIME DAILY  Qty: 1 Each, Refills: 2      ondansetron hcl (Zofran) 4 mg tablet Take 1 Tablet by mouth every eight (8) hours as needed for Nausea or Vomiting. Qty: 7 Tablet, Refills: 0      diclofenac EC (VOLTAREN) 75 mg EC tablet Take 1 Tablet by mouth two (2) times daily as needed for Pain. Take with food. Qty: 60 Tablet, Refills: 0      lisinopriL (PRINIVIL, ZESTRIL) 10 mg tablet Take 1 Tablet by mouth daily. Qty: 90 Tablet, Refills: 2    Associated Diagnoses: Essential hypertension      metoprolol succinate (TOPROL-XL) 25 mg XL tablet Take 1 Tablet by mouth daily. Qty: 90 Tablet, Refills: 2      acetaminophen (Tylenol 8 Hour) 650 mg TbER Take 1 Tablet by mouth two (2) times daily as needed for Pain. Qty: 180 Tablet, Refills: 0      albuterol (PROVENTIL HFA, VENTOLIN HFA, PROAIR HFA) 90 mcg/actuation inhaler Take 2 Puffs by inhalation every six (6) hours as needed for Wheezing. Qty: 1 Inhaler, Refills: 3           2. Follow-up Information     Follow up With Specialties Details Why Contact Info    Laurie Hernández MD Internal Medicine In 2 days  [facility] [address]  [phone]          3. Return to ED if worse   4. Current Discharge Medication List      START taking these medications    Details   ketorolac (TORADOL) 10 mg tablet Take 1 Tablet by mouth every six (6) hours as needed for Pain for up to 5 days. Qty: 20 Tablet, Refills: 0  Start date: 3/22/2022, End date: 3/27/2022             Diagnosis     Clinical Impression:   1.  Back pain, unspecified back location, unspecified back pain laterality, unspecified chronicity    2. Renal cyst, left    3. Umbilical hernia without obstruction and without gangrene        Attestations:    Razia Lauren NP    Please note that this dictation was completed with uniRow, the computer voice recognition software. Quite often unanticipated grammatical, syntax, homophones, and other interpretive errors are inadvertently transcribed by the computer software. Please disregard these errors. Please excuse any errors that have escaped final proofreading. Thank you.

## 2022-03-29 ENCOUNTER — PATIENT OUTREACH (OUTPATIENT)
Dept: CASE MANAGEMENT | Age: 47
End: 2022-03-29

## 2022-03-29 NOTE — PROGRESS NOTES
03/29/22    [facility] attempted outreach to patient and was not successful. [facility] left Vm with contact information for a return call. [facility] letter to patient asking him to get in touch.  ST

## 2022-03-29 NOTE — LETTER
3/29/2022 12:45 PM    Mr. Cindy Layne  [address] [place], [place] [address] [address]        My name is Jean-Claude Pretty. I am a Care Manager with [facility]. I often work with patients who could benefit from additional support understanding and managing their health. We are committed to providing you excellent care. I have been unable to reach you on this at [phone]. Please contact me at [phone] if you would like additional help with community resources. We appreciate the confidence you've shown by selecting us to provide your healthcare needs and I look forward to hearing from you soon.                  Sincerely,      Denisse Rutherford RN

## 2022-04-08 RX ORDER — DICLOFENAC SODIUM 75 MG/1
TABLET, DELAYED RELEASE ORAL
Qty: 60 TABLET | Refills: 0 | Status: SHIPPED | OUTPATIENT
Start: 2022-04-08

## 2022-04-13 ENCOUNTER — PATIENT OUTREACH (OUTPATIENT)
Dept: CASE MANAGEMENT | Age: 47
End: 2022-04-13

## 2022-04-13 NOTE — PROGRESS NOTES
04/13/22  04:15     [facility] made one final attempt to reach the patient. [facility] has made several attempts to reach the patient including mailing a \"get in touch\" letter on 3/29/22 with no response from the patient to date. Perico Mejía RN BSN - Ambulatory Care Manager - . [phone]    4/15/22  1:55pm  [facility] made one last attempt to reach the patient, [facility] has not been successful. No further outreach scheduled at this time.    ST

## 2022-04-26 PROBLEM — N52.9 IMPOTENCE: Status: RESOLVED | Noted: 2020-10-10 | Resolved: 2022-04-26

## 2022-04-26 PROBLEM — M79.642 PAIN IN BOTH HANDS: Status: RESOLVED | Noted: 2020-10-10 | Resolved: 2022-04-26

## 2022-04-26 PROBLEM — Z11.59 NEED FOR HEPATITIS C SCREENING TEST: Status: ACTIVE | Noted: 2022-04-26

## 2022-04-26 PROBLEM — M79.641 PAIN IN BOTH HANDS: Status: RESOLVED | Noted: 2020-10-10 | Resolved: 2022-04-26

## 2022-04-26 PROBLEM — R20.2 PARESTHESIA OF LEFT UPPER LIMB: Status: RESOLVED | Noted: 2020-10-10 | Resolved: 2022-04-26

## 2022-04-26 PROBLEM — R25.2 HAND CRAMPS: Status: RESOLVED | Noted: 2020-10-10 | Resolved: 2022-04-26

## 2022-04-26 PROBLEM — G51.0 BELL'S PALSY: Status: RESOLVED | Noted: 2020-10-12 | Resolved: 2022-04-26

## 2022-04-26 PROBLEM — Z12.11 SCREENING FOR COLON CANCER: Status: ACTIVE | Noted: 2022-04-26

## 2022-04-26 PROBLEM — D72.829 LEUKOCYTOSIS: Status: RESOLVED | Noted: 2020-10-10 | Resolved: 2022-04-26

## 2022-04-26 PROBLEM — R35.0 INCREASED FREQUENCY OF URINATION: Status: RESOLVED | Noted: 2020-10-10 | Resolved: 2022-04-26

## 2022-05-25 ENCOUNTER — TELEPHONE (OUTPATIENT)
Dept: INTERNAL MEDICINE CLINIC | Age: 47
End: 2022-05-25

## 2022-05-25 RX ORDER — METRONIDAZOLE 500 MG/1
500 TABLET ORAL 2 TIMES DAILY
Qty: 14 TABLET | Refills: 0 | Status: SHIPPED | OUTPATIENT
Start: 2022-05-25

## 2022-05-26 PROBLEM — Z11.59 NEED FOR HEPATITIS C SCREENING TEST: Status: RESOLVED | Noted: 2022-04-26 | Resolved: 2022-05-26

## 2022-05-26 PROBLEM — Z12.11 SCREENING FOR COLON CANCER: Status: RESOLVED | Noted: 2022-04-26 | Resolved: 2022-05-26

## 2022-08-18 ENCOUNTER — HOSPITAL ENCOUNTER (EMERGENCY)
Age: 47
Discharge: HOME | End: 2022-08-18
Attending: EMERGENCY MEDICINE
Payer: MEDICARE

## 2022-08-18 ENCOUNTER — APPOINTMENT (OUTPATIENT)
Dept: CT IMAGING | Age: 47
End: 2022-08-18
Attending: EMERGENCY MEDICINE
Payer: MEDICARE

## 2022-08-18 VITALS
SYSTOLIC BLOOD PRESSURE: 132 MMHG | HEART RATE: 89 BPM | WEIGHT: 267 LBS | DIASTOLIC BLOOD PRESSURE: 80 MMHG | OXYGEN SATURATION: 99 % | HEIGHT: 67 IN | BODY MASS INDEX: 41.91 KG/M2 | TEMPERATURE: 98.7 F | RESPIRATION RATE: 18 BRPM

## 2022-08-18 DIAGNOSIS — L03.213 PRESEPTAL CELLULITIS OF LEFT EYE: Primary | ICD-10-CM

## 2022-08-18 DIAGNOSIS — N34.2 URETHRITIS: ICD-10-CM

## 2022-08-18 DIAGNOSIS — H10.32 ACUTE BACTERIAL CONJUNCTIVITIS OF LEFT EYE: ICD-10-CM

## 2022-08-18 LAB
APPEARANCE UR: CLEAR
BACTERIA URNS QL MICRO: NEGATIVE /HPF
BILIRUB UR QL: NEGATIVE
COLOR UR: YELLOW
GLUCOSE UR STRIP.AUTO-MCNC: NEGATIVE MG/DL
HGB UR QL STRIP: NEGATIVE
KETONES UR QL STRIP.AUTO: NEGATIVE MG/DL
LEUKOCYTE ESTERASE UR QL STRIP.AUTO: NEGATIVE
MUCOUS THREADS URNS QL MICRO: ABNORMAL /LPF
NITRITE UR QL STRIP.AUTO: NEGATIVE
PH UR STRIP: 7 [PH] (ref 5–8)
PROT UR STRIP-MCNC: NEGATIVE MG/DL
RBC #/AREA URNS HPF: ABNORMAL /HPF (ref 0–5)
SP GR UR REFRACTOMETRY: 1.01 (ref 1–1.03)
UROBILINOGEN UR QL STRIP.AUTO: 4 EU/DL (ref 0.1–1)
WBC URNS QL MICRO: ABNORMAL /HPF (ref 0–4)

## 2022-08-18 PROCEDURE — 99284 EMERGENCY DEPT VISIT MOD MDM: CPT

## 2022-08-18 PROCEDURE — 70450 CT HEAD/BRAIN W/O DYE: CPT

## 2022-08-18 PROCEDURE — 81003 URINALYSIS AUTO W/O SCOPE: CPT

## 2022-08-18 PROCEDURE — 87491 CHLMYD TRACH DNA AMP PROBE: CPT

## 2022-08-18 PROCEDURE — 74011250637 HC RX REV CODE- 250/637: Performed by: EMERGENCY MEDICINE

## 2022-08-18 RX ORDER — BUTALBITAL, ACETAMINOPHEN AND CAFFEINE 50; 325; 40 MG/1; MG/1; MG/1
2 TABLET ORAL
Status: COMPLETED | OUTPATIENT
Start: 2022-08-18 | End: 2022-08-18

## 2022-08-18 RX ORDER — DOXYCYCLINE HYCLATE 100 MG
100 TABLET ORAL 2 TIMES DAILY
Qty: 20 TABLET | Refills: 0 | Status: SHIPPED | OUTPATIENT
Start: 2022-08-18 | End: 2022-08-28

## 2022-08-18 RX ORDER — DOXYCYCLINE 100 MG/1
100 CAPSULE ORAL EVERY 12 HOURS
Status: DISCONTINUED | OUTPATIENT
Start: 2022-08-18 | End: 2022-08-18 | Stop reason: HOSPADM

## 2022-08-18 RX ORDER — ERYTHROMYCIN 5 MG/G
OINTMENT OPHTHALMIC
Qty: 3.5 G | Refills: 0 | Status: SHIPPED | OUTPATIENT
Start: 2022-08-18 | End: 2022-08-25

## 2022-08-18 RX ORDER — HYDROXYZINE PAMOATE 25 MG/1
25 CAPSULE ORAL
Qty: 30 CAPSULE | Refills: 0 | Status: SHIPPED | OUTPATIENT
Start: 2022-08-18 | End: 2022-09-01

## 2022-08-18 RX ADMIN — DOXYCYCLINE HYCLATE 100 MG: 100 CAPSULE ORAL at 12:31

## 2022-08-18 RX ADMIN — BUTALBITAL, ACETAMINOPHEN, AND CAFFEINE 2 TABLET: 50; 325; 40 TABLET ORAL at 12:31

## 2022-08-18 NOTE — Clinical Note
[address] [facility] EMERGENCY DEPT  [address] [facility] [id number]  [phone]    Work/School Note    Date: 8/18/2022    To Whom It May concern:      Ramona Trotter was seen and treated today in the emergency room by the following provider(s):  Attending Provider: Vincent Moreno MD.      Ramona Trotter is excused from work/school on 08/18/22. He is clear to return to work/school on 08/19/22.         Sincerely,          Jessi Spears MD

## 2022-08-18 NOTE — ED PROVIDER NOTES
Obtain a          EMERGENCY DEPARTMENT HISTORY AND PHYSICAL EXAM      Date: 8/18/2022  Patient Name: Jeremiah Hart    History of Presenting Illness     Chief Complaint   Patient presents with    Eye Swelling    Neck Pain       History Provided By: Patient    HPI: Jeremiah Hart, 56 y.o. male with a past medical history significant No significant past medical history presents to the ED with chief complaint of Eye Swelling and Neck Pain  . 15-year-old male with multiple complaints. Dull pressure headache occipital.  Throbbing. Ongoing for quite a while many months to years. Worse today. Has been taking over-the-counter medicine with minimal relief. No fevers. Still weakness. No nausea or vomiting. Is also having some irritation and itching in his groin region. Has been seen multiple time for STDs all negative. Some discomfort when he voids. No lesions no bleeding. No discharge. Also 1 day of left eye discharge swelling and irritation. Does have some discomfort. No trauma. No ointments. LOCATION - ha  QUALITY-  throbbing  SEVERITY - mild  TIMING - 1d  SETTING-  monthsof intermitt pain  RADIATION - none  MODIFYING FACTORS- eye irritation        There are no other complaints, changes, or physical findings at this time. PCP: Erica Bridges MD    Current Facility-Administered Medications   Medication Dose Route Frequency Provider Last Rate Last Admin    doxycycline (VIBRAMYCIN) capsule 100 mg  100 mg Oral Q12H Loc LEAL MD   100 mg at 08/18/22 1231     Current Outpatient Medications   Medication Sig Dispense Refill    doxycycline (VIBRA-TABS) 100 mg tablet Take 1 Tablet by mouth two (2) times a day for 10 days.  20 Tablet 0    metoprolol succinate (TOPROL-XL) 25 mg XL tablet TAKE 1 TABLET EVERY DAY 90 Tablet 0    lisinopriL (PRINIVIL, ZESTRIL) 10 mg tablet TAKE 1 TABLET EVERY DAY 90 Tablet 0    metroNIDAZOLE (FLAGYL) 500 mg tablet Take 1 Tablet by mouth two (2) times a day. Indications: an infection due to Trichomonas vaginalis 14 Tablet 0    diclofenac EC (VOLTAREN) 75 mg EC tablet TAKE 1 TABLET TWICE DAILY WITH FOOD AS NEEDED FOR PAIN 60 Tablet 0    azelastine (ASTELIN) 137 mcg (0.1 %) nasal spray USE 2 SPRAYS IN EACH NOSTRIL ONE TIME DAILY 1 Each 2    ondansetron hcl (Zofran) 4 mg tablet Take 1 Tablet by mouth every eight (8) hours as needed for Nausea or Vomiting. 7 Tablet 0    acetaminophen (Tylenol 8 Hour) 650 mg TbER Take 1 Tablet by mouth two (2) times daily as needed for Pain. 180 Tablet 0    albuterol (PROVENTIL HFA, VENTOLIN HFA, PROAIR HFA) 90 mcg/actuation inhaler Take 2 Puffs by inhalation every six (6) hours as needed for Wheezing. 1 Inhaler 3       Past History     Past Medical History:  Past Medical History:   Diagnosis Date    Hand cramps 10/10/2020    Hypercholesterolemia     Hypertension     Impotence 10/10/2020    Increased frequency of urination 10/10/2020    Leukocytosis 10/10/2020    Migraines     Mild intermittent asthma without complication 3/19/3328    Multiple acquired skin tags 10/10/2020    Pain in both hands 10/10/2020    Paresthesia of left upper limb 10/10/2020    Seasonal allergic rhinitis 10/10/2020    Vitamin D deficiency 10/10/2020       Past Surgical History:  Past Surgical History:   Procedure Laterality Date    HX APPENDECTOMY  1993    HX PROSTATE SURGERY      HX UROLOGICAL         Family History:  Family History   Adopted: Yes   Family history unknown: Yes       Social History:  Social History     Tobacco Use    Smoking status: Never    Smokeless tobacco: Never   Substance Use Topics    Alcohol use: No    Drug use: Never       Allergies: Allergies   Allergen Reactions    Keflex [Cephalexin] Anaphylaxis and Shortness of Breath         Review of Systems   Review of Systems   Constitutional: Negative. Negative for chills, fatigue and fever. HENT: Negative. Negative for congestion, ear pain, nosebleeds and sore throat.     Eyes:  Positive for discharge, itching and visual disturbance. Negative for pain. Respiratory: Negative. Negative for cough, chest tightness and shortness of breath. Cardiovascular: Negative. Negative for chest pain and leg swelling. Gastrointestinal: Negative. Negative for abdominal pain, blood in stool, constipation, diarrhea, nausea and vomiting. Endocrine: Negative. Genitourinary:  Positive for dysuria. Negative for difficulty urinating and flank pain. Musculoskeletal: Negative. Negative for back pain and myalgias. Skin: Negative. Negative for rash and wound. Allergic/Immunologic: Negative. Neurological:  Positive for headaches. Negative for dizziness, syncope, weakness and numbness. Hematological: Negative. Does not bruise/bleed easily. Psychiatric/Behavioral: Negative. Negative for agitation, confusion, hallucinations and suicidal ideas. All other systems reviewed and are negative. Physical Exam   Physical Exam  Vitals and nursing note reviewed. Constitutional:       General: He is not in acute distress. Appearance: He is normal weight. He is not ill-appearing. HENT:      Head: Normocephalic and atraumatic. Right Ear: External ear normal.      Left Ear: External ear normal.      Nose: Nose normal. No rhinorrhea. Mouth/Throat:      Mouth: Mucous membranes are moist.      Pharynx: Oropharynx is clear. Eyes:      General:         Left eye: Discharge present. Extraocular Movements: Extraocular movements intact. Conjunctiva/sclera: Conjunctivae normal.      Pupils: Pupils are equal, round, and reactive to light. Cardiovascular:      Rate and Rhythm: Normal rate and regular rhythm. Pulses: Normal pulses. Heart sounds: Normal heart sounds. Pulmonary:      Effort: Pulmonary effort is normal. No respiratory distress. Breath sounds: Normal breath sounds. Abdominal:      General: Abdomen is flat.  Bowel sounds are normal.      Palpations: Abdomen is soft.   Musculoskeletal:         General: No tenderness or deformity. Normal range of motion. Cervical back: Normal range of motion and neck supple. Skin:     General: Skin is warm and dry. Capillary Refill: Capillary refill takes less than 2 seconds. Findings: No bruising, lesion or rash. Neurological:      General: No focal deficit present. Mental Status: He is alert and oriented to person, place, and time. Mental status is at baseline. Psychiatric:         Mood and Affect: Mood normal.         Behavior: Behavior normal.         Thought Content: Thought content normal.         Judgment: Judgment normal.       Diagnostic Study Results     Labs -   No results found for this or any previous visit (from the past 12 hour(s)). Radiologic Studies -   CT HEAD WO CONT   Final Result   No significant intracranial findings. CT Results  (Last 48 hours)                 08/18/22 1221  CT HEAD WO CONT Final result    Impression:  No significant intracranial findings. Narrative:  EXAM: CT HEAD WO CONT       INDICATION: ha       COMPARISON: None. CONTRAST: None. TECHNIQUE: Unenhanced CT of the head was performed using 5 mm images. Brain and   bone windows were generated. Coronal and sagittal reformats. CT dose reduction   was achieved through use of a standardized protocol tailored for this   examination and automatic exposure control for dose modulation. FINDINGS:   Incidental opacification maxillary sinuses. Incidental preseptal soft tissue   swelling orbits. No extra-axial fluid collection, hemorrhage shift or masses. Normal sized   ventricle. CXR Results  (Last 48 hours)      None            Medical Decision Making and ED Course   I am the first provider for this patient. I reviewed the vital signs, available nursing notes, past medical history, past surgical history, family history and social history.     Vital Signs-Reviewed the patient's vital signs. Patient Vitals for the past 12 hrs:   Temp Pulse Resp BP SpO2   08/18/22 1156 98.9 °F (37.2 °C) 95 20 138/84 97 %       EKG interpretation:         Records Reviewed: Previous Hospital chart. EMS run report      ED Course:   Initial assessment performed. The patients presenting problems have been discussed, and they are in agreement with the care plan formulated and outlined with them. I have encouraged them to ask questions as they arise throughout their visit. Orders Placed This Encounter    CT HEAD WO CONT     Standing Status:   Standing     Number of Occurrences:   1     Order Specific Question:   Transport     Answer:   Stretcher [5]     Order Specific Question:   Reason for Exam     Answer:   ha     Order Specific Question:   Decision Support Exception     Answer:   Emergency Medical Condition (MA) [1]    URINALYSIS W/ RFLX MICROSCOPIC     Standing Status:   Standing     Number of Occurrences:   1    CHLAMYDIA / GC-AMPLIFIED     Standing Status:   Standing     Number of Occurrences:   1     Order Specific Question:   Specimen source     Answer:   Urine [258]    butalbital-acetaminophen-caffeine (FIORICET, ESGIC) -40 mg per tablet 2 Tablet    doxycycline (VIBRAMYCIN) capsule 100 mg     Order Specific Question:   Antibiotic Indications     Answer:   Urinary Tract Infection    doxycycline (VIBRA-TABS) 100 mg tablet     Sig: Take 1 Tablet by mouth two (2) times a day for 10 days. Dispense:  20 Tablet     Refill:  0                 Provider Notes (Medical Decision Making): 11year-old with left eye irritation discharge concern for periorbital cellulitis versus conjunctivitis we will treat with both. Given he has drainage and some redness. CT scan to evaluate for his headache. Also I do not feel that is related with  work-up including gonorrhea chlamydia. Evaluate for urethritis or UTI. Will treat with doxycycline given a penicillin allergy. Consults              Discharged    Procedures               Disposition       Emergency Department Disposition:  Discharged      Diagnosis     Clinical Impression:   1. Preseptal cellulitis of left eye    2. Acute bacterial conjunctivitis of left eye    3. Urethritis        Attestations:    Ender Bateman MD    Please note that this dictation was completed with [employer], the computer voice recognition software. Quite often unanticipated grammatical, syntax, homophones, and other interpretive errors are inadvertently transcribed by the computer software. Please disregard these errors. Please excuse any errors that have escaped final proofreading. Thank you.

## 2022-08-18 NOTE — Clinical Note
[address] [facility] EMERGENCY DEPT  [address] [place] [id number]  [phone]    Work/School Note    Date: 8/18/2022    To Whom It May concern:    Eun Blanchard was seen and treated today in the emergency room by the following provider(s):  Attending Provider: Douglas Hyatt MD.      Eun Blanchard is excused from work/school on 08/18/22 and 08/19/22. He is medically clear to return to work/school on 8/20/2022.        Sincerely,          Joan Eubanks MD

## 2022-08-18 NOTE — DISCHARGE INSTRUCTIONS
Thank you! Thank you for allowing me to care for you in the emergency department. It is my goal to provide you with excellent care. If you have not received excellent quality care, please ask to speak to the nurse manager. Please fill out the survey that will come to you by mail or email since we listen to your feedback! Below you will find a list of your tests from today's visit. Should you have any questions, please do not hesitate to call the emergency department. Labs  No results found for this or any previous visit (from the past 12 hour(s)). Radiologic Studies  CT HEAD WO CONT   Final Result   No significant intracranial findings. CT Results  (Last 48 hours)                 08/18/22 1221  CT HEAD WO CONT Final result    Impression:  No significant intracranial findings. Narrative:  EXAM: CT HEAD WO CONT       INDICATION: ha       COMPARISON: None. CONTRAST: None. TECHNIQUE: Unenhanced CT of the head was performed using 5 mm images. Brain and   bone windows were generated. Coronal and sagittal reformats. CT dose reduction   was achieved through use of a standardized protocol tailored for this   examination and automatic exposure control for dose modulation. FINDINGS:   Incidental opacification maxillary sinuses. Incidental preseptal soft tissue   swelling orbits. No extra-axial fluid collection, hemorrhage shift or masses. Normal sized   ventricle. CXR Results  (Last 48 hours)      None          ------------------------------------------------------------------------------------------------------------  The exam and treatment you received in the Emergency Department were for an urgent problem and are not intended as complete care. It is important that you follow-up with a doctor, nurse practitioner, or physician assistant to:  (1) confirm your diagnosis,  (2) re-evaluation of changes in your illness and treatment, and  (3) for ongoing care.  Please take your discharge instructions with you when you go to your follow-up appointment. If you have any problem arranging a follow-up appointment, contact the Emergency Department. If your symptoms become worse or you do not improve as expected and you are unable to reach your health care provider, please return to the Emergency Department. We are available 24 hours a day. If a prescription has been provided, please have it filled as soon as possible to prevent a delay in treatment. If you have any questions or reservations about taking the medication due to side effects or interactions with other medications, please call your primary care provider or contact the ER.

## 2022-08-22 LAB
C TRACH RRNA SPEC QL NAA+PROBE: NEGATIVE
N GONORRHOEA RRNA SPEC QL NAA+PROBE: NEGATIVE
PLEASE NOTE:, 188601: NORMAL
SPECIMEN SOURCE: NORMAL

## 2022-11-06 ENCOUNTER — HOSPITAL ENCOUNTER (EMERGENCY)
Age: 47
Discharge: HOME | End: 2022-11-06
Attending: STUDENT IN AN ORGANIZED HEALTH CARE EDUCATION/TRAINING PROGRAM
Payer: MEDICARE

## 2022-11-06 ENCOUNTER — APPOINTMENT (OUTPATIENT)
Dept: GENERAL RADIOLOGY | Age: 47
End: 2022-11-06
Attending: STUDENT IN AN ORGANIZED HEALTH CARE EDUCATION/TRAINING PROGRAM
Payer: MEDICARE

## 2022-11-06 VITALS
OXYGEN SATURATION: 100 % | WEIGHT: 278 LBS | HEIGHT: 67 IN | HEART RATE: 80 BPM | RESPIRATION RATE: 20 BRPM | DIASTOLIC BLOOD PRESSURE: 63 MMHG | BODY MASS INDEX: 43.63 KG/M2 | TEMPERATURE: 98.1 F | SYSTOLIC BLOOD PRESSURE: 112 MMHG

## 2022-11-06 DIAGNOSIS — J06.9 VIRAL URI: Primary | ICD-10-CM

## 2022-11-06 LAB
ALBUMIN SERPL-MCNC: 3.8 G/DL (ref 3.5–5)
ALBUMIN/GLOB SERPL: 1.1 {RATIO} (ref 1.1–2.2)
ALP SERPL-CCNC: 94 U/L (ref 45–117)
ALT SERPL-CCNC: 27 U/L (ref 12–78)
ANION GAP SERPL CALC-SCNC: 6 MMOL/L (ref 5–15)
AST SERPL W P-5'-P-CCNC: 15 U/L (ref 15–37)
BASOPHILS # BLD: 0.1 K/UL (ref 0–0.1)
BASOPHILS NFR BLD: 1 % (ref 0–1)
BILIRUB SERPL-MCNC: 0.3 MG/DL (ref 0.2–1)
BNP SERPL-MCNC: 26 PG/ML
BUN SERPL-MCNC: 9 MG/DL (ref 6–20)
BUN/CREAT SERPL: 10 (ref 12–20)
CA-I BLD-MCNC: 9.2 MG/DL (ref 8.5–10.1)
CHLORIDE SERPL-SCNC: 104 MMOL/L (ref 97–108)
CO2 SERPL-SCNC: 27 MMOL/L (ref 21–32)
COVID-19 RAPID TEST, COVR: NOT DETECTED
CREAT SERPL-MCNC: 0.9 MG/DL (ref 0.7–1.3)
DIFFERENTIAL METHOD BLD: ABNORMAL
EOSINOPHIL # BLD: 0.3 K/UL (ref 0–0.4)
EOSINOPHIL NFR BLD: 3 % (ref 0–7)
ERYTHROCYTE [DISTWIDTH] IN BLOOD BY AUTOMATED COUNT: 12.6 % (ref 11.5–14.5)
FLUAV AG NPH QL IA: NEGATIVE
FLUBV AG NOSE QL IA: NEGATIVE
GLOBULIN SER CALC-MCNC: 3.6 G/DL (ref 2–4)
GLUCOSE SERPL-MCNC: 151 MG/DL (ref 65–100)
HCT VFR BLD AUTO: 42.4 % (ref 36.6–50.3)
HGB BLD-MCNC: 15.6 G/DL (ref 12.1–17)
IMM GRANULOCYTES # BLD AUTO: 0 K/UL (ref 0–0.04)
IMM GRANULOCYTES NFR BLD AUTO: 0 % (ref 0–0.5)
LYMPHOCYTES # BLD: 2.8 K/UL (ref 0.8–3.5)
LYMPHOCYTES NFR BLD: 34 % (ref 12–49)
MCH RBC QN AUTO: 30.9 PG (ref 26–34)
MCHC RBC AUTO-ENTMCNC: 36.8 G/DL (ref 30–36.5)
MCV RBC AUTO: 84 FL (ref 80–99)
MONOCYTES # BLD: 0.9 K/UL (ref 0–1)
MONOCYTES NFR BLD: 11 % (ref 5–13)
NEUTS SEG # BLD: 4.3 K/UL (ref 1.8–8)
NEUTS SEG NFR BLD: 51 % (ref 32–75)
NRBC # BLD: 0 K/UL (ref 0–0.01)
NRBC BLD-RTO: 0 PER 100 WBC
PLATELET # BLD AUTO: 322 K/UL (ref 150–400)
PMV BLD AUTO: 9.6 FL (ref 8.9–12.9)
POTASSIUM SERPL-SCNC: 4.1 MMOL/L (ref 3.5–5.1)
PROT SERPL-MCNC: 7.4 G/DL (ref 6.4–8.2)
RBC # BLD AUTO: 5.05 M/UL (ref 4.1–5.7)
SODIUM SERPL-SCNC: 137 MMOL/L (ref 136–145)
TROPONIN-HIGH SENSITIVITY: 7 NG/L (ref 0–76)
WBC # BLD AUTO: 8.3 K/UL (ref 4.1–11.1)

## 2022-11-06 PROCEDURE — 83880 ASSAY OF NATRIURETIC PEPTIDE: CPT

## 2022-11-06 PROCEDURE — 93005 ELECTROCARDIOGRAM TRACING: CPT

## 2022-11-06 PROCEDURE — 74011250636 HC RX REV CODE- 250/636: Performed by: STUDENT IN AN ORGANIZED HEALTH CARE EDUCATION/TRAINING PROGRAM

## 2022-11-06 PROCEDURE — 99285 EMERGENCY DEPT VISIT HI MDM: CPT

## 2022-11-06 PROCEDURE — 87804 INFLUENZA ASSAY W/OPTIC: CPT

## 2022-11-06 PROCEDURE — 71045 X-RAY EXAM CHEST 1 VIEW: CPT

## 2022-11-06 PROCEDURE — 96374 THER/PROPH/DIAG INJ IV PUSH: CPT

## 2022-11-06 PROCEDURE — 87635 SARS-COV-2 COVID-19 AMP PRB: CPT

## 2022-11-06 PROCEDURE — 36415 COLL VENOUS BLD VENIPUNCTURE: CPT

## 2022-11-06 PROCEDURE — 84484 ASSAY OF TROPONIN QUANT: CPT

## 2022-11-06 PROCEDURE — 85025 COMPLETE CBC W/AUTO DIFF WBC: CPT

## 2022-11-06 PROCEDURE — 80053 COMPREHEN METABOLIC PANEL: CPT

## 2022-11-06 RX ORDER — KETOROLAC TROMETHAMINE 30 MG/ML
30 INJECTION, SOLUTION INTRAMUSCULAR; INTRAVENOUS
Status: COMPLETED | OUTPATIENT
Start: 2022-11-06 | End: 2022-11-06

## 2022-11-06 RX ADMIN — SODIUM CHLORIDE 1000 ML: 9 INJECTION, SOLUTION INTRAVENOUS at 13:15

## 2022-11-06 RX ADMIN — KETOROLAC TROMETHAMINE 30 MG: 30 INJECTION, SOLUTION INTRAMUSCULAR; INTRAVENOUS at 13:14

## 2022-11-06 NOTE — ED PROVIDER NOTES
EMERGENCY DEPARTMENT HISTORY AND PHYSICAL EXAM      Date: 11/6/2022  Patient Name: Deangelo West    History of Presenting Illness     Chief Complaint   Patient presents with    Shortness of Breath    Back Pain       History Provided By: Patient    HPI: Deangelo West, 56 y.o. male with history as reported below presenting to the ED for 2 days of cough, shortness of breath, rhinorrhea, tearing from the eyes associated with generalized aching in the shoulders and back. He reports that today he really did not have an appetite, his wife became concerned about his symptoms and recommended that he come to the ED. he has not had any noted fever or chills. He has not had any leg swelling, denies any prior cardiac history. There are no other complaints, changes, or physical findings at this time. PCP: Hina Preciado MD    No current facility-administered medications on file prior to encounter. Current Outpatient Medications on File Prior to Encounter   Medication Sig Dispense Refill    metoprolol succinate (TOPROL-XL) 25 mg XL tablet TAKE 1 TABLET EVERY DAY 90 Tablet 0    lisinopriL (PRINIVIL, ZESTRIL) 10 mg tablet TAKE 1 TABLET EVERY DAY 90 Tablet 0    metroNIDAZOLE (FLAGYL) 500 mg tablet Take 1 Tablet by mouth two (2) times a day. Indications: an infection due to Trichomonas vaginalis 14 Tablet 0    diclofenac EC (VOLTAREN) 75 mg EC tablet TAKE 1 TABLET TWICE DAILY WITH FOOD AS NEEDED FOR PAIN 60 Tablet 0    azelastine (ASTELIN) 137 mcg (0.1 %) nasal spray USE 2 SPRAYS IN EACH NOSTRIL ONE TIME DAILY 1 Each 2    ondansetron hcl (Zofran) 4 mg tablet Take 1 Tablet by mouth every eight (8) hours as needed for Nausea or Vomiting. 7 Tablet 0    acetaminophen (Tylenol 8 Hour) 650 mg TbER Take 1 Tablet by mouth two (2) times daily as needed for Pain.  180 Tablet 0    albuterol (PROVENTIL HFA, VENTOLIN HFA, PROAIR HFA) 90 mcg/actuation inhaler Take 2 Puffs by inhalation every six (6) hours as needed for Wheezing. 1 Inhaler 3       Past History     Past Medical History:  Past Medical History:   Diagnosis Date    Hand cramps 10/10/2020    Hypercholesterolemia     Hypertension     Impotence 10/10/2020    Increased frequency of urination 10/10/2020    Leukocytosis 10/10/2020    Migraines     Mild intermittent asthma without complication 4/04/1452    Multiple acquired skin tags 10/10/2020    Pain in both hands 10/10/2020    Paresthesia of left upper limb 10/10/2020    Seasonal allergic rhinitis 10/10/2020    Vitamin D deficiency 10/10/2020       Past Surgical History:  Past Surgical History:   Procedure Laterality Date    HX APPENDECTOMY  1993    HX PROSTATE SURGERY      HX UROLOGICAL         Family History:  Family History   Adopted: Yes   Family history unknown: Yes       Social History:  Social History     Tobacco Use    Smoking status: Never    Smokeless tobacco: Never   Substance Use Topics    Alcohol use: No    Drug use: Never       Allergies: Allergies   Allergen Reactions    Keflex [Cephalexin] Anaphylaxis and Shortness of Breath       Review of Systems   Review of Systems   Constitutional:  Negative for activity change. HENT:  Positive for rhinorrhea and sore throat. Negative for drooling. Eyes:  Negative for redness. Respiratory:  Positive for cough and shortness of breath. Negative for apnea, chest tightness, wheezing and stridor. Cardiovascular:  Negative for chest pain and leg swelling. Gastrointestinal:  Negative for abdominal pain. Genitourinary:  Negative for dysuria. Musculoskeletal:  Negative for back pain and neck pain. Skin:  Negative for color change. Neurological:  Negative for weakness and headaches. Psychiatric/Behavioral:  Negative for agitation. All other systems reviewed and are negative. Physical Exam   Physical Exam  Vitals and nursing note reviewed. Constitutional:       General: He is not in acute distress. Appearance: Normal appearance.  He is not toxic-appearing. Interventions: He is not intubated. HENT:      Head: Normocephalic. Nose: Congestion and rhinorrhea present. Mouth/Throat:      Mouth: Mucous membranes are moist.      Pharynx: No oropharyngeal exudate. Eyes:      Conjunctiva/sclera: Conjunctivae normal.   Cardiovascular:      Rate and Rhythm: Normal rate. Pulses: Normal pulses. Pulmonary:      Effort: Pulmonary effort is normal. No tachypnea or respiratory distress. He is not intubated. Breath sounds: Normal breath sounds. No decreased breath sounds. Abdominal:      General: Abdomen is flat. Musculoskeletal:         General: No deformity. Cervical back: Neck supple. Right lower leg: No edema. Left lower leg: No edema. Skin:     General: Skin is warm. Neurological:      General: No focal deficit present. Mental Status: He is alert. Psychiatric:         Mood and Affect: Mood normal.       Lab and Diagnostic Study Results   Labs -     Recent Results (from the past 12 hour(s))   TROPONIN-HIGH SENSITIVITY    Collection Time: 11/06/22 12:49 PM   Result Value Ref Range    Troponin-High Sensitivity 7 0 - 76 ng/L   CBC WITH AUTOMATED DIFF    Collection Time: 11/06/22 12:49 PM   Result Value Ref Range    WBC 8.3 4.1 - 11.1 K/uL    RBC 5.05 4. 10 - 5.70 M/uL    HGB 15.6 12.1 - 17.0 g/dL    HCT 42.4 36.6 - 50.3 %    MCV 84.0 80.0 - 99.0 FL    MCH 30.9 26.0 - 34.0 PG    MCHC 36.8 (H) 30.0 - 36.5 g/dL    RDW 12.6 11.5 - 14.5 %    PLATELET 274 914 - 908 K/uL    MPV 9.6 8.9 - 12.9 FL    NRBC 0.0 0.0  WBC    ABSOLUTE NRBC 0.00 0.00 - 0.01 K/uL    NEUTROPHILS 51 32 - 75 %    LYMPHOCYTES 34 12 - 49 %    MONOCYTES 11 5 - 13 %    EOSINOPHILS 3 0 - 7 %    BASOPHILS 1 0 - 1 %    IMMATURE GRANULOCYTES 0 0 - 0.5 %    ABS. NEUTROPHILS 4.3 1.8 - 8.0 K/UL    ABS. LYMPHOCYTES 2.8 0.8 - 3.5 K/UL    ABS. MONOCYTES 0.9 0.0 - 1.0 K/UL    ABS. EOSINOPHILS 0.3 0.0 - 0.4 K/UL    ABS.  BASOPHILS 0.1 0.0 - 0.1 K/UL ABS. IMM. GRANS. 0.0 0.00 - 0.04 K/UL    DF AUTOMATED     METABOLIC PANEL, COMPREHENSIVE    Collection Time: 11/06/22 12:49 PM   Result Value Ref Range    Sodium 137 136 - 145 mmol/L    Potassium 4.1 3.5 - 5.1 mmol/L    Chloride 104 97 - 108 mmol/L    CO2 27 21 - 32 mmol/L    Anion gap 6 5 - 15 mmol/L    Glucose 151 (H) 65 - 100 mg/dL    BUN 9 6 - 20 mg/dL    Creatinine 0.90 0.70 - 1.30 mg/dL    BUN/Creatinine ratio 10 (L) 12 - 20      eGFR >60 >60 ml/min/1.73m2    Calcium 9.2 8.5 - 10.1 mg/dL    Bilirubin, total 0.3 0.2 - 1.0 mg/dL    AST (SGOT) 15 15 - 37 U/L    ALT (SGPT) 27 12 - 78 U/L    Alk. phosphatase 94 45 - 117 U/L    Protein, total 7.4 6.4 - 8.2 g/dL    Albumin 3.8 3.5 - 5.0 g/dL    Globulin 3.6 2.0 - 4.0 g/dL    A-G Ratio 1.1 1.1 - 2.2     NT-PRO BNP    Collection Time: 11/06/22 12:49 PM   Result Value Ref Range    NT pro-BNP 26 <125 pg/mL   INFLUENZA A & B AG (RAPID TEST)    Collection Time: 11/06/22 12:49 PM   Result Value Ref Range    Influenza A Antigen Negative Negative      Influenza B Antigen Negative Negative     COVID-19 RAPID TEST    Collection Time: 11/06/22 12:49 PM   Result Value Ref Range    COVID-19 rapid test Not Detected Not Detected         Radiologic Studies -   @[email or web address]@  CT Results  (Last 48 hours)      None          CXR Results  (Last 48 hours)                 11/06/22 1257  XR CHEST PORT Final result    Impression:  1. No acute disease           Narrative:  INDICATION:  dizziness        Exam: Portable chest 1253. Comparison: 6/29/2020. Findings: Cardiomediastinal silhouette is within normal limits. Pulmonary   vasculature is not engorged. There are no focal parenchymal opacities,   effusions, or pneumothorax. Medical Decision Making and ED Course   Differential Diagnosis & Medical Decision Making Provider Note:   45-year-old male presenting to the ED for URI symptoms, shortness of breath. Vital signs on arrival are unremarkable.   Differential diagnosis includes most likely viral URI given his history and exam, less likely ACS, CHF, COPD, asthma, pulmonary embolism. His history and exam does not really suggest any of those etiologies, but we will obtain blood work and x-ray to assess.    - I am the first provider for this patient. I reviewed the vital signs, available nursing notes, past medical history, past surgical history, family history and social history. The patients presenting problems have been discussed, and they are in agreement with the care plan formulated and outlined with them. I have encouraged them to ask questions as they arise throughout their visit. Vital Signs-Reviewed the patient's vital signs. Patient Vitals for the past 12 hrs:   Temp Pulse Resp BP SpO2   11/06/22 1612 -- 80 20 112/63 100 %   11/06/22 1443 -- 87 16 129/83 96 %   11/06/22 1330 -- -- -- -- 97 %   11/06/22 1228 98.1 °F (36.7 °C) 94 26 136/82 97 %     EKG: Normal sinus at 97 with normal intervals, normal axis, no ST or T changes  ED Course:   ED Course as of 11/06/22 2213   Sun Nov 06, 2022   1416 Report from hospital states that after a witnessed the incident, the patient did not fall in the ER, #he reports he felt sort of light headed so had to sit down on the floor of the ED [PC]   1626 Ambulatory pulse ox is normal with sats maintained 96%. Patient's generalized back pain and head pain improved with Toradol. I believe his symptoms to be all be related to a URI. [PC]      ED Course User Index  [PC] Elida Miranda MD   Blood work without any significant derangements. Patient improved with fluids and improved with medications he reports pain is improved, will discharge. Patient is well appearing, not hypoxic, not altered, and is not in respiratory distress. Due to normal exam and the absence of hypoxia, there is no concern for significant pneumonia or meningococcal infection. Patient does not meet criteria for admission or inpatient treatment.      As the patient appears generally well, non-toxic, with normal mental status and with a completely reassuring clinical picture and exam, and is able to tolerate PO intake, I feel the patient is a good candidate for outpatient treatment with return precautions. Understanding was insured that at this time there is no evidence for a more malignant underlying process, but that early in the process of an illness, an emergency department workup can be falsely reassuring. Routine discharge counseling was given including the fact that any worsening, changing or persistent symptoms should prompt an immediate call or follow up with their primary physician or the emergency department. The importance of appropriate follow up was also discussed. More extensive discharge instructions were given in the patient's discharge paperwork. After completion of evaluation and discussion of results and diagnoses, all the questions were answered. If required, all follow up appointments and treatments were discussed and explained. Understanding was insured prior to discharge. Procedures   Performed by: Clinton Yeung MD  Procedures      Disposition   Disposition: DC- Adult Discharges: All of the diagnostic tests were reviewed and questions answered. Diagnosis, care plan and treatment options were discussed. The patient understands the instructions and will follow up as directed. The patients results have been reviewed with them. They have been counseled regarding their diagnosis. The patient verbally convey understanding and agreement of the signs, symptoms, diagnosis, treatment and prognosis and additionally agrees to follow up as recommended with their PCP in 24 - 48 hours. They also agree with the care-plan and convey that all of their questions have been answered.   I have also put together some discharge instructions for them that include: 1) educational information regarding their diagnosis, 2) how to care for their diagnosis at home, as well a 3) list of reasons why they would want to return to the ED prior to their follow-up appointment, should their condition change. DISCHARGE PLAN:  1. Current Discharge Medication List        CONTINUE these medications which have NOT CHANGED    Details   metoprolol succinate (TOPROL-XL) 25 mg XL tablet TAKE 1 TABLET EVERY DAY  Qty: 90 Tablet, Refills: 0      lisinopriL (PRINIVIL, ZESTRIL) 10 mg tablet TAKE 1 TABLET EVERY DAY  Qty: 90 Tablet, Refills: 0    Associated Diagnoses: Essential hypertension      metroNIDAZOLE (FLAGYL) 500 mg tablet Take 1 Tablet by mouth two (2) times a day. Indications: an infection due to Trichomonas vaginalis  Qty: 14 Tablet, Refills: 0      diclofenac EC (VOLTAREN) 75 mg EC tablet TAKE 1 TABLET TWICE DAILY WITH FOOD AS NEEDED FOR PAIN  Qty: 60 Tablet, Refills: 0      azelastine (ASTELIN) 137 mcg (0.1 %) nasal spray USE 2 SPRAYS IN EACH NOSTRIL ONE TIME DAILY  Qty: 1 Each, Refills: 2      ondansetron hcl (Zofran) 4 mg tablet Take 1 Tablet by mouth every eight (8) hours as needed for Nausea or Vomiting. Qty: 7 Tablet, Refills: 0      acetaminophen (Tylenol 8 Hour) 650 mg TbER Take 1 Tablet by mouth two (2) times daily as needed for Pain. Qty: 180 Tablet, Refills: 0      albuterol (PROVENTIL HFA, VENTOLIN HFA, PROAIR HFA) 90 mcg/actuation inhaler Take 2 Puffs by inhalation every six (6) hours as needed for Wheezing. Qty: 1 Inhaler, Refills: 3           2. Follow-up Information       Follow up With Specialties Details Why Contact Info    Dony Fraser MD Internal Medicine Physician Schedule an appointment as soon as possible for a visit in 3 days  [address]  [phone]            3. Return to ED if worse   4. Discharge Medication List as of 11/6/2022  4:42 PM          Diagnosis/Clinical Impression     Clinical Impression:   1.  Viral URI        Attestations: Eriberto Carlos MD, am the primary clinician of record. Please note that this dictation was completed with [employer] the [employer] voice recognition software. Quite often unanticipated grammatical, syntax, homophones, and other interpretive errors are inadvertently transcribed by the computer software. Please disregard these errors. Please excuse any errors that have escaped final proofreading. Thank you.

## 2022-11-06 NOTE — Clinical Note
[facility] EMERGENCY DEPT  [address] [place] [address] [place] [id number]  [phone]    Work/School Note    Date: 11/6/2022    To Whom It May concern:    Candi Barba was seen and treated today in the emergency room by the following provider(s):  Attending Provider: Ofilia Cheadle, MD.      Candi Barba is excused from work/school on 11/6/2022 through 11/8/2022. He is medically clear to return to work/school on 11/9/2022.          Sincerely,          Nicolas Vinson MD

## 2022-11-06 NOTE — ED TRIAGE NOTES
Sob, mid back pain, radiates down spine, has been going on for a couple days. Had a fall while walking into ed waiting room, states he got lightheaded, triage assist into triage room, did not hit head. No new injury reported from fall.

## 2022-11-06 NOTE — DISCHARGE INSTRUCTIONS
Please be sure to return to ED with any  new or worsening symptoms. Thank you! Thank you for allowing me to care for you in the emergency department. I sincerely hope that you are satisfied with your visit today. It is my goal to provide you with excellent care. Below you will find a list of your labs and imaging from your visit today if applicable. Should you have any questions regarding these results please do not hesitate to call the emergency department. Please review [employer] for a more detailed result list since the below list may not be comprehensive. Instructions on how to sign up to [employer] should be provided in this packet. Labs -     Recent Results (from the past 12 hour(s))   TROPONIN-HIGH SENSITIVITY    Collection Time: 11/06/22 12:49 PM   Result Value Ref Range    Troponin-High Sensitivity 7 0 - 76 ng/L   CBC WITH AUTOMATED DIFF    Collection Time: 11/06/22 12:49 PM   Result Value Ref Range    WBC 8.3 4.1 - 11.1 K/uL    RBC 5.05 4. 10 - 5.70 M/uL    HGB 15.6 12.1 - 17.0 g/dL    HCT 42.4 36.6 - 50.3 %    MCV 84.0 80.0 - 99.0 FL    MCH 30.9 26.0 - 34.0 PG    MCHC 36.8 (H) 30.0 - 36.5 g/dL    RDW 12.6 11.5 - 14.5 %    PLATELET 153 461 - 894 K/uL    MPV 9.6 8.9 - 12.9 FL    NRBC 0.0 0.0  WBC    ABSOLUTE NRBC 0.00 0.00 - 0.01 K/uL    NEUTROPHILS 51 32 - 75 %    LYMPHOCYTES 34 12 - 49 %    MONOCYTES 11 5 - 13 %    EOSINOPHILS 3 0 - 7 %    BASOPHILS 1 0 - 1 %    IMMATURE GRANULOCYTES 0 0 - 0.5 %    ABS. NEUTROPHILS 4.3 1.8 - 8.0 K/UL    ABS. LYMPHOCYTES 2.8 0.8 - 3.5 K/UL    ABS. MONOCYTES 0.9 0.0 - 1.0 K/UL    ABS. EOSINOPHILS 0.3 0.0 - 0.4 K/UL    ABS. BASOPHILS 0.1 0.0 - 0.1 K/UL    ABS. IMM.  GRANS. 0.0 0.00 - 0.04 K/UL    DF AUTOMATED     METABOLIC PANEL, COMPREHENSIVE    Collection Time: 11/06/22 12:49 PM   Result Value Ref Range    Sodium 137 136 - 145 mmol/L    Potassium 4.1 3.5 - 5.1 mmol/L    Chloride 104 97 - 108 mmol/L    CO2 27 21 - 32 mmol/L    Anion gap 6 5 - 15 mmol/L Glucose 151 (H) 65 - 100 mg/dL    BUN 9 6 - 20 mg/dL    Creatinine 0.90 0.70 - 1.30 mg/dL    BUN/Creatinine ratio 10 (L) 12 - 20      eGFR >60 >60 ml/min/1.73m2    Calcium 9.2 8.5 - 10.1 mg/dL    Bilirubin, total 0.3 0.2 - 1.0 mg/dL    AST (SGOT) 15 15 - 37 U/L    ALT (SGPT) 27 12 - 78 U/L    Alk. phosphatase 94 45 - 117 U/L    Protein, total 7.4 6.4 - 8.2 g/dL    Albumin 3.8 3.5 - 5.0 g/dL    Globulin 3.6 2.0 - 4.0 g/dL    A-G Ratio 1.1 1.1 - 2.2     NT-PRO BNP    Collection Time: 11/06/22 12:49 PM   Result Value Ref Range    NT pro-BNP 26 <125 pg/mL   INFLUENZA A & B AG (RAPID TEST)    Collection Time: 11/06/22 12:49 PM   Result Value Ref Range    Influenza A Antigen Negative Negative      Influenza B Antigen Negative Negative     COVID-19 RAPID TEST    Collection Time: 11/06/22 12:49 PM   Result Value Ref Range    COVID-19 rapid test Not Detected Not Detected         Radiologic Studies -   XR CHEST PORT   Final Result   1. No acute disease            CT Results  (Last 48 hours)      None          CXR Results  (Last 48 hours)                 11/06/22 1257  XR CHEST PORT Final result    Impression:  1. No acute disease           Narrative:  INDICATION:  dizziness        Exam: Portable chest 1253. Comparison: 6/29/2020. Findings: Cardiomediastinal silhouette is within normal limits. Pulmonary   vasculature is not engorged. There are no focal parenchymal opacities,   effusions, or pneumothorax. If you feel that you have not received excellent quality care or timely care, please ask to speak to the nurse manager. Please choose us in the future for your continued health care needs. ------------------------------------------------------------------------------------------------------------  The exam and treatment you received in the Emergency Department were for an urgent problem and are not intended as complete care.  It is very important that you follow-up with a doctor, nurse practitioner, or physician assistant in a timely manner to:  (1) confirm your diagnosis and review all imaging and lab results,  (2) re-evaluation of changes in your illness and treatment, and  (3) for ongoing care. If your symptoms become worse or you do not improve as expected and you are unable to reach your usual health care provider, you should return to the Emergency Department. We are available 24 hours a day. Please take your discharge instructions with you when you go to your follow-up appointment. If a prescription has been provided, please have it filled as soon as possible to prevent a delay in treatment. Read the entire medication instruction sheet provided to you by the pharmacy. If you have any questions or reservations about taking the medication due to side effects or interactions with other medications, please call your primary care physician or contact the ER to speak with the charge nurse. Make an appointment with your family doctor or the physician you were referred to for follow-up of this visit as instructed on your discharge paperwork, as this is a mandatory follow-up. Return to the ER if you are unable to be seen or if you are unable to be seen in a timely manner. If you have any problem arranging the follow-up visit, contact the Emergency Department immediately.

## 2022-11-07 ENCOUNTER — HOSPITAL ENCOUNTER (EMERGENCY)
Age: 47
Discharge: HOME | End: 2022-11-07
Payer: MEDICARE

## 2022-11-07 VITALS
HEIGHT: 66 IN | SYSTOLIC BLOOD PRESSURE: 120 MMHG | WEIGHT: 276 LBS | TEMPERATURE: 98.9 F | OXYGEN SATURATION: 93 % | BODY MASS INDEX: 44.36 KG/M2 | HEART RATE: 101 BPM | RESPIRATION RATE: 23 BRPM | DIASTOLIC BLOOD PRESSURE: 62 MMHG

## 2022-11-07 DIAGNOSIS — J06.9 VIRAL URI WITH COUGH: Primary | ICD-10-CM

## 2022-11-07 LAB
ATRIAL RATE: 97 BPM
ATRIAL RATE: 97 BPM
CALCULATED P AXIS, ECG09: 32 DEGREES
CALCULATED P AXIS, ECG09: 35 DEGREES
CALCULATED R AXIS, ECG10: 28 DEGREES
CALCULATED R AXIS, ECG10: 31 DEGREES
CALCULATED T AXIS, ECG11: 33 DEGREES
CALCULATED T AXIS, ECG11: 42 DEGREES
DIAGNOSIS, 93000: NORMAL
DIAGNOSIS, 93000: NORMAL
P-R INTERVAL, ECG05: 130 MS
P-R INTERVAL, ECG05: 166 MS
Q-T INTERVAL, ECG07: 326 MS
Q-T INTERVAL, ECG07: 334 MS
QRS DURATION, ECG06: 68 MS
QRS DURATION, ECG06: 84 MS
QTC CALCULATION (BEZET), ECG08: 414 MS
QTC CALCULATION (BEZET), ECG08: 424 MS
VENTRICULAR RATE, ECG03: 97 BPM
VENTRICULAR RATE, ECG03: 97 BPM

## 2022-11-07 PROCEDURE — 99283 EMERGENCY DEPT VISIT LOW MDM: CPT

## 2022-11-07 PROCEDURE — 93005 ELECTROCARDIOGRAM TRACING: CPT

## 2022-11-07 PROCEDURE — 74011000250 HC RX REV CODE- 250: Performed by: NURSE PRACTITIONER

## 2022-11-07 PROCEDURE — 74011250637 HC RX REV CODE- 250/637: Performed by: NURSE PRACTITIONER

## 2022-11-07 PROCEDURE — 94640 AIRWAY INHALATION TREATMENT: CPT

## 2022-11-07 RX ORDER — BUTALBITAL, ACETAMINOPHEN AND CAFFEINE 300; 40; 50 MG/1; MG/1; MG/1
1 CAPSULE ORAL
Qty: 12 CAPSULE | Refills: 0 | Status: SHIPPED | OUTPATIENT
Start: 2022-11-07

## 2022-11-07 RX ORDER — IPRATROPIUM BROMIDE AND ALBUTEROL SULFATE 2.5; .5 MG/3ML; MG/3ML
3 SOLUTION RESPIRATORY (INHALATION)
Status: COMPLETED | OUTPATIENT
Start: 2022-11-07 | End: 2022-11-07

## 2022-11-07 RX ORDER — ALBUTEROL SULFATE 90 UG/1
2 AEROSOL, METERED RESPIRATORY (INHALATION)
Qty: 18 G | Refills: 0 | Status: SHIPPED | OUTPATIENT
Start: 2022-11-07

## 2022-11-07 RX ORDER — CHLORPHENIRAMINE MALEATE AND DEXTROMETHORPHAN HYDROBROMIDE 4; 30 MG/1; MG/1
TABLET, FILM COATED ORAL
Qty: 20 TABLET | Refills: 0 | Status: SHIPPED | OUTPATIENT
Start: 2022-11-07

## 2022-11-07 RX ORDER — DEXAMETHASONE SODIUM PHOSPHATE 10 MG/ML
10 INJECTION INTRAMUSCULAR; INTRAVENOUS ONCE
Status: DISCONTINUED | OUTPATIENT
Start: 2022-11-07 | End: 2022-11-07

## 2022-11-07 RX ORDER — BUTALBITAL, ACETAMINOPHEN AND CAFFEINE 50; 325; 40 MG/1; MG/1; MG/1
2 TABLET ORAL
Status: COMPLETED | OUTPATIENT
Start: 2022-11-07 | End: 2022-11-07

## 2022-11-07 RX ORDER — PROMETHAZINE HYDROCHLORIDE AND DEXTROMETHORPHAN HYDROBROMIDE 6.25; 15 MG/5ML; MG/5ML
5 SYRUP ORAL
Qty: 120 ML | Refills: 0 | Status: SHIPPED | OUTPATIENT
Start: 2022-11-07 | End: 2022-11-14

## 2022-11-07 RX ADMIN — IPRATROPIUM BROMIDE AND ALBUTEROL SULFATE 3 ML: .5; 2.5 SOLUTION RESPIRATORY (INHALATION) at 16:18

## 2022-11-07 RX ADMIN — BUTALBITAL, ACETAMINOPHEN, AND CAFFEINE 2 TABLET: 50; 325; 40 TABLET ORAL at 16:43

## 2022-11-07 NOTE — Clinical Note
[facility] EMERGENCY DEPT  [address] [place] [id number]  [phone]    Work/School Note    Date: 11/7/2022    To Whom It May concern:      Serjio Lee was seen and treated today in the emergency room by the following provider(s):  Nurse Practitioner: Tono Nava NP.      Serjio Lee is excused from work/school on 11/07/22. He is clear to return to work/school on 11/08/22.         Sincerely,          Krystle Kulkarni NP

## 2022-11-07 NOTE — ED PROVIDER NOTES
EMERGENCY DEPARTMENT HISTORY AND PHYSICAL EXAM      Date: 11/7/2022  Patient Name: Karma Alvarez    History of Presenting Illness     Chief Complaint   Patient presents with    Shortness of Breath       History Provided By: Patient    HPI: Karma Alvarez, 56 y.o. male who has a past medical history of hypertension presents to the ER for shortness of breath. Patient complains of right-sided chest pain. Patient also reports shortness of breath. Patient came in last night and was discharged with a URI. Patient was given Decadron Zofran and Toradol by EMS. Patient was not prescribed any meds last night. Moderate severity, no known exacerbating or relieving factors, no other associated signs and symptoms      There are no other complaints, changes, or physical findings at this time. PCP: Андрей Lee MD    No current facility-administered medications on file prior to encounter. Current Outpatient Medications on File Prior to Encounter   Medication Sig Dispense Refill    metoprolol succinate (TOPROL-XL) 25 mg XL tablet TAKE 1 TABLET EVERY DAY 90 Tablet 0    lisinopriL (PRINIVIL, ZESTRIL) 10 mg tablet TAKE 1 TABLET EVERY DAY 90 Tablet 0    metroNIDAZOLE (FLAGYL) 500 mg tablet Take 1 Tablet by mouth two (2) times a day. Indications: an infection due to Trichomonas vaginalis 14 Tablet 0    diclofenac EC (VOLTAREN) 75 mg EC tablet TAKE 1 TABLET TWICE DAILY WITH FOOD AS NEEDED FOR PAIN 60 Tablet 0    azelastine (ASTELIN) 137 mcg (0.1 %) nasal spray USE 2 SPRAYS IN EACH NOSTRIL ONE TIME DAILY 1 Each 2    ondansetron hcl (Zofran) 4 mg tablet Take 1 Tablet by mouth every eight (8) hours as needed for Nausea or Vomiting. 7 Tablet 0    acetaminophen (Tylenol 8 Hour) 650 mg TbER Take 1 Tablet by mouth two (2) times daily as needed for Pain.  180 Tablet 0    albuterol (PROVENTIL HFA, VENTOLIN HFA, PROAIR HFA) 90 mcg/actuation inhaler Take 2 Puffs by inhalation every six (6) hours as needed for Wheezing. 1 Inhaler 3       Past History     Past Medical History:  Past Medical History:   Diagnosis Date    Hand cramps 10/10/2020    Hypercholesterolemia     Hypertension     Impotence 10/10/2020    Increased frequency of urination 10/10/2020    Leukocytosis 10/10/2020    Migraines     Mild intermittent asthma without complication 7/44/9865    Multiple acquired skin tags 10/10/2020    Pain in both hands 10/10/2020    Paresthesia of left upper limb 10/10/2020    Seasonal allergic rhinitis 10/10/2020    Vitamin D deficiency 10/10/2020       Past Surgical History:  Past Surgical History:   Procedure Laterality Date    HX APPENDECTOMY  1993    HX PROSTATE SURGERY      HX UROLOGICAL         Family History:  Family History   Adopted: Yes   Family history unknown: Yes       Social History:  Social History     Tobacco Use    Smoking status: Never    Smokeless tobacco: Never   Substance Use Topics    Alcohol use: No    Drug use: Never       Allergies: Allergies   Allergen Reactions    Keflex [Cephalexin] Anaphylaxis and Shortness of Breath       Review of Systems   Review of Systems   Constitutional:  Negative for chills, fatigue and fever. HENT:  Negative for congestion, sinus pressure and trouble swallowing. Eyes:  Negative for photophobia and pain. Respiratory:  Positive for cough and shortness of breath. Cardiovascular:  Negative for chest pain and leg swelling. Gastrointestinal:  Negative for abdominal pain, diarrhea, nausea and vomiting. Endocrine: Negative for polydipsia, polyphagia and polyuria. Genitourinary:  Negative for decreased urine volume, difficulty urinating, dysuria, hematuria and urgency. Musculoskeletal:  Negative for back pain, gait problem, myalgias and neck pain. Skin:  Negative for pallor and rash. Allergic/Immunologic: Negative for environmental allergies and food allergies.    Neurological:  Negative for dizziness, facial asymmetry, speech difficulty, numbness and headaches. Hematological:  Negative for adenopathy. Does not bruise/bleed easily. Psychiatric/Behavioral:  Negative for agitation, self-injury and suicidal ideas. The patient is not nervous/anxious. Physical Exam   Physical Exam  Constitutional:       General: He is not in acute distress. Appearance: Normal appearance. He is not ill-appearing or toxic-appearing. HENT:      Head: Normocephalic and atraumatic. Nose: Nose normal.      Mouth/Throat:      Mouth: Mucous membranes are moist.   Eyes:      Extraocular Movements: Extraocular movements intact. Pupils: Pupils are equal, round, and reactive to light. Cardiovascular:      Rate and Rhythm: Normal rate and regular rhythm. Pulmonary:      Effort: Pulmonary effort is normal.      Breath sounds: Normal breath sounds. Abdominal:      General: Bowel sounds are normal.   Musculoskeletal:         General: Normal range of motion. Cervical back: Normal range of motion and neck supple. Skin:     General: Skin is warm and dry. Capillary Refill: Capillary refill takes less than 2 seconds. Neurological:      General: No focal deficit present. Mental Status: He is alert and oriented to person, place, and time. Psychiatric:         Mood and Affect: Mood normal.         Behavior: Behavior normal.       Lab and Diagnostic Study Results   Labs -   No results found for this or any previous visit (from the past 12 hour(s)). Radiologic Studies -   @lastxrresult@  CT Results  (Last 48 hours)      None          CXR Results  (Last 48 hours)      None            Medical Decision Making and ED Course   Differential Diagnosis & Medical Decision Making Provider Note:   Pt presents with acute URI symptoms including nasal congestion, rhinorrhea and sore throat. Pt also has c/o of cough without dyspnea, chest pain or wheezing. Pt is well-appearing with stable vitals and benign exam; symptoms are consistent with an uncomplicated URI.   DDx: acute bronchitis, COVID-19, bacterial sinusitis vs. pharyngitis, migraine, flu. Symptomatic therapy suggested: acetaminophen, ibuprofen, antihistamine-decongestant of choice, cough suppressant of choice. Increase fluids, use vaporizer, stay in steamy bathroom tid 15 min prn severe cough, tylenol as needed, rest, avoid smoky areas. Lack of antibiotic effectiveness discussed with her. Symptomatic therapy suggested: gargle for sore throat, use mist at bedside for congestion. Apply facial warm packs for sinus pain or use nasal saline sprays. Follow up prn if not better in 72 hours. - I am the first provider for this patient. I reviewed the vital signs, available nursing notes, past medical history, past surgical history, family history and social history. The patients presenting problems have been discussed, and they are in agreement with the care plan formulated and outlined with them. I have encouraged them to ask questions as they arise throughout their visit. Vital Signs-Reviewed the patient's vital signs. No data found. ED Course:          Procedures   Performed by: Lisa Jean NP  Procedures      Disposition   Disposition: DC- Adult Discharges: All of the diagnostic tests were reviewed and questions answered. Diagnosis, care plan and treatment options were discussed. The patient understands the instructions and will follow up as directed. The patients results have been reviewed with them. They have been counseled regarding their diagnosis. The patient verbally convey understanding and agreement of the signs, symptoms, diagnosis, treatment and prognosis and additionally agrees to follow up as recommended with their PCP in 24 - 48 hours. They also agree with the care-plan and convey that all of their questions have been answered.   I have also put together some discharge instructions for them that include: 1) educational information regarding their diagnosis, 2) how to care for their diagnosis at home, as well a 3) list of reasons why they would want to return to the ED prior to their follow-up appointment, should their condition change. DISCHARGE PLAN:  1. Current Discharge Medication List        CONTINUE these medications which have NOT CHANGED    Details   metoprolol succinate (TOPROL-XL) 25 mg XL tablet TAKE 1 TABLET EVERY DAY  Qty: 90 Tablet, Refills: 0      lisinopriL (PRINIVIL, ZESTRIL) 10 mg tablet TAKE 1 TABLET EVERY DAY  Qty: 90 Tablet, Refills: 0    Associated Diagnoses: Essential hypertension      metroNIDAZOLE (FLAGYL) 500 mg tablet Take 1 Tablet by mouth two (2) times a day. Indications: an infection due to Trichomonas vaginalis  Qty: 14 Tablet, Refills: 0      diclofenac EC (VOLTAREN) 75 mg EC tablet TAKE 1 TABLET TWICE DAILY WITH FOOD AS NEEDED FOR PAIN  Qty: 60 Tablet, Refills: 0      azelastine (ASTELIN) 137 mcg (0.1 %) nasal spray USE 2 SPRAYS IN EACH NOSTRIL ONE TIME DAILY  Qty: 1 Each, Refills: 2      ondansetron hcl (Zofran) 4 mg tablet Take 1 Tablet by mouth every eight (8) hours as needed for Nausea or Vomiting. Qty: 7 Tablet, Refills: 0      acetaminophen (Tylenol 8 Hour) 650 mg TbER Take 1 Tablet by mouth two (2) times daily as needed for Pain. Qty: 180 Tablet, Refills: 0      albuterol (PROVENTIL HFA, VENTOLIN HFA, PROAIR HFA) 90 mcg/actuation inhaler Take 2 Puffs by inhalation every six (6) hours as needed for Wheezing. Qty: 1 Inhaler, Refills: 3           2. Follow-up Information       Follow up With Specialties Details Why Contact Info    Андрей Lee MD Internal Medicine Physician   [address] [facility],[address], [facility]  [phone]            3. Return to ED if worse   4.    Discharge Medication List as of 11/7/2022  5:58 PM        START taking these medications    Details   !! albuterol (Ventolin HFA) 90 mcg/actuation inhaler Take 2 Puffs by inhalation every four (4) hours as needed for Wheezing., Normal, Disp-18 g, R-0 promethazine-dextromethorphan (PROMETHAZINE-DM) 6.25-15 mg/5 mL syrup Take 5 mL by mouth every four (4) hours as needed for Cough for up to 7 days. , Normal, Disp-120 mL, R-0      chlorpheniramine-dm (Coricidin HBP Cough and Cold) 4-30 mg tab As directed on box, Normal, Disp-20 Tablet, R-0      butalbital-acetaminophen-caff (Fioricet) -40 mg per capsule Take 1 Capsule by mouth every four (4) hours as needed for Headache., Normal, Disp-12 Capsule, R-0       !! - Potential duplicate medications found. Please discuss with provider. CONTINUE these medications which have NOT CHANGED    Details   metoprolol succinate (TOPROL-XL) 25 mg XL tablet TAKE 1 TABLET EVERY DAY, Normal, Disp-90 Tablet, R-0      lisinopriL (PRINIVIL, ZESTRIL) 10 mg tablet TAKE 1 TABLET EVERY DAY, Normal, Disp-90 Tablet, R-0      metroNIDAZOLE (FLAGYL) 500 mg tablet Take 1 Tablet by mouth two (2) times a day. Indications: an infection due to Trichomonas vaginalis, Normal, Disp-14 Tablet, R-0      diclofenac EC (VOLTAREN) 75 mg EC tablet TAKE 1 TABLET TWICE DAILY WITH FOOD AS NEEDED FOR PAIN, Normal, Disp-60 Tablet, R-0      azelastine (ASTELIN) 137 mcg (0.1 %) nasal spray USE 2 SPRAYS IN EACH NOSTRIL ONE TIME DAILY, Normal, Disp-1 Each, R-2      ondansetron hcl (Zofran) 4 mg tablet Take 1 Tablet by mouth every eight (8) hours as needed for Nausea or Vomiting., Normal, Disp-7 Tablet, R-0      acetaminophen (Tylenol 8 Hour) 650 mg TbER Take 1 Tablet by mouth two (2) times daily as needed for Pain., Normal, Disp-180 Tablet, R-0      !! albuterol (PROVENTIL HFA, VENTOLIN HFA, PROAIR HFA) 90 mcg/actuation inhaler Take 2 Puffs by inhalation every six (6) hours as needed for Wheezing., Normal, Disp-1 Inhaler, R-3       !! - Potential duplicate medications found. Please discuss with provider. Diagnosis/Clinical Impression     Clinical Impression:   1.  Viral URI with cough        Attestations: Libertad JAY NP, am the primary clinician of record. Please note that this dictation was completed with Ritot, the computer voice recognition software. Quite often unanticipated grammatical, syntax, homophones, and other interpretive errors are inadvertently transcribed by the computer software. Please disregard these errors. Please excuse any errors that have escaped final proofreading. Thank you.

## 2022-11-07 NOTE — ED TRIAGE NOTES
Patient presents to the ED with SOB and R sided chest pain. Pt reports HA and dizziness too. Came in last night and discharged with URI. RA sats 94%.  EMS gave 10 of decadron, 4mg zofran and 15mg Toradol

## 2022-11-08 LAB
ATRIAL RATE: 98 BPM
CALCULATED P AXIS, ECG09: 39 DEGREES
CALCULATED R AXIS, ECG10: 32 DEGREES
CALCULATED T AXIS, ECG11: 32 DEGREES
DIAGNOSIS, 93000: NORMAL
P-R INTERVAL, ECG05: 156 MS
Q-T INTERVAL, ECG07: 330 MS
QRS DURATION, ECG06: 76 MS
QTC CALCULATION (BEZET), ECG08: 421 MS
VENTRICULAR RATE, ECG03: 98 BPM

## 2022-11-12 ENCOUNTER — APPOINTMENT (OUTPATIENT)
Dept: GENERAL RADIOLOGY | Age: 47
End: 2022-11-12
Attending: EMERGENCY MEDICINE
Payer: MEDICARE

## 2022-11-12 ENCOUNTER — HOSPITAL ENCOUNTER (EMERGENCY)
Age: 47
Discharge: HOME | End: 2022-11-13
Payer: MEDICARE

## 2022-11-12 DIAGNOSIS — J06.9 UPPER RESPIRATORY TRACT INFECTION, UNSPECIFIED TYPE: Primary | ICD-10-CM

## 2022-11-12 LAB
ALBUMIN SERPL-MCNC: 3.4 G/DL (ref 3.5–5)
ALBUMIN/GLOB SERPL: 1 {RATIO} (ref 1.1–2.2)
ALP SERPL-CCNC: 89 U/L (ref 45–117)
ALT SERPL-CCNC: 42 U/L (ref 12–78)
ANION GAP SERPL CALC-SCNC: 5 MMOL/L (ref 5–15)
AST SERPL W P-5'-P-CCNC: 22 U/L (ref 15–37)
BASOPHILS # BLD: 0.1 K/UL (ref 0–0.1)
BASOPHILS NFR BLD: 1 % (ref 0–1)
BILIRUB SERPL-MCNC: 0.1 MG/DL (ref 0.2–1)
BNP SERPL-MCNC: 34 PG/ML
BUN SERPL-MCNC: 8 MG/DL (ref 6–20)
BUN/CREAT SERPL: 10 (ref 12–20)
CA-I BLD-MCNC: 8.9 MG/DL (ref 8.5–10.1)
CHLORIDE SERPL-SCNC: 104 MMOL/L (ref 97–108)
CO2 SERPL-SCNC: 29 MMOL/L (ref 21–32)
CREAT SERPL-MCNC: 0.8 MG/DL (ref 0.7–1.3)
D DIMER PPP FEU-MCNC: 0.41 UG/ML(FEU)
DIFFERENTIAL METHOD BLD: ABNORMAL
EOSINOPHIL # BLD: 0.3 K/UL (ref 0–0.4)
EOSINOPHIL NFR BLD: 3 % (ref 0–7)
ERYTHROCYTE [DISTWIDTH] IN BLOOD BY AUTOMATED COUNT: 12.6 % (ref 11.5–14.5)
FLUAV RNA SPEC QL NAA+PROBE: NOT DETECTED
FLUBV RNA SPEC QL NAA+PROBE: NOT DETECTED
GLOBULIN SER CALC-MCNC: 3.3 G/DL (ref 2–4)
GLUCOSE SERPL-MCNC: 101 MG/DL (ref 65–100)
HCT VFR BLD AUTO: 40.9 % (ref 36.6–50.3)
HGB BLD-MCNC: 14.8 G/DL (ref 12.1–17)
IMM GRANULOCYTES # BLD AUTO: 0.1 K/UL (ref 0–0.04)
IMM GRANULOCYTES NFR BLD AUTO: 1 % (ref 0–0.5)
LYMPHOCYTES # BLD: 2.7 K/UL (ref 0.8–3.5)
LYMPHOCYTES NFR BLD: 26 % (ref 12–49)
MCH RBC QN AUTO: 30.5 PG (ref 26–34)
MCHC RBC AUTO-ENTMCNC: 36.2 G/DL (ref 30–36.5)
MCV RBC AUTO: 84.3 FL (ref 80–99)
MONOCYTES # BLD: 1.2 K/UL (ref 0–1)
MONOCYTES NFR BLD: 12 % (ref 5–13)
NEUTS SEG # BLD: 6.2 K/UL (ref 1.8–8)
NEUTS SEG NFR BLD: 57 % (ref 32–75)
NRBC # BLD: 0 K/UL (ref 0–0.01)
NRBC BLD-RTO: 0 PER 100 WBC
PLATELET # BLD AUTO: 307 K/UL (ref 150–400)
PMV BLD AUTO: 8.8 FL (ref 8.9–12.9)
POTASSIUM SERPL-SCNC: 3.9 MMOL/L (ref 3.5–5.1)
PROT SERPL-MCNC: 6.7 G/DL (ref 6.4–8.2)
RBC # BLD AUTO: 4.85 M/UL (ref 4.1–5.7)
SARS-COV-2, COV2: NOT DETECTED
SODIUM SERPL-SCNC: 138 MMOL/L (ref 136–145)
TROPONIN-HIGH SENSITIVITY: 9 NG/L (ref 0–76)
WBC # BLD AUTO: 10.6 K/UL (ref 4.1–11.1)

## 2022-11-12 PROCEDURE — 83880 ASSAY OF NATRIURETIC PEPTIDE: CPT

## 2022-11-12 PROCEDURE — 80053 COMPREHEN METABOLIC PANEL: CPT

## 2022-11-12 PROCEDURE — 36415 COLL VENOUS BLD VENIPUNCTURE: CPT

## 2022-11-12 PROCEDURE — 85379 FIBRIN DEGRADATION QUANT: CPT

## 2022-11-12 PROCEDURE — 85025 COMPLETE CBC W/AUTO DIFF WBC: CPT

## 2022-11-12 PROCEDURE — 87636 SARSCOV2 & INF A&B AMP PRB: CPT

## 2022-11-12 PROCEDURE — 93005 ELECTROCARDIOGRAM TRACING: CPT

## 2022-11-12 PROCEDURE — 99285 EMERGENCY DEPT VISIT HI MDM: CPT

## 2022-11-12 PROCEDURE — 71045 X-RAY EXAM CHEST 1 VIEW: CPT

## 2022-11-12 PROCEDURE — 84484 ASSAY OF TROPONIN QUANT: CPT

## 2022-11-12 NOTE — Clinical Note
[facility] EMERGENCY DEPT  [address] [place] [address] [place] [address]  [phone]    Work/School Note    Date: 11/12/2022    To Whom It May concern:    Deangelo West was seen and treated today in the emergency room by the following provider(s):  Nurse Practitioner: Barber Reddy NP.      Deangelo West is excused from work/school on 11/13/2022 through 11/15/2022. He is medically clear to return to work/school on 11/16/2022.          Sincerely,          Ramonita Russell NP

## 2022-11-13 VITALS
DIASTOLIC BLOOD PRESSURE: 69 MMHG | WEIGHT: 275 LBS | SYSTOLIC BLOOD PRESSURE: 126 MMHG | HEART RATE: 78 BPM | HEIGHT: 66 IN | BODY MASS INDEX: 44.2 KG/M2 | TEMPERATURE: 98.2 F | RESPIRATION RATE: 22 BRPM | OXYGEN SATURATION: 97 %

## 2022-11-13 LAB
ATRIAL RATE: 97 BPM
CALCULATED P AXIS, ECG09: 26 DEGREES
CALCULATED R AXIS, ECG10: 25 DEGREES
CALCULATED T AXIS, ECG11: 39 DEGREES
DIAGNOSIS, 93000: NORMAL
P-R INTERVAL, ECG05: 126 MS
Q-T INTERVAL, ECG07: 306 MS
QRS DURATION, ECG06: 72 MS
QTC CALCULATION (BEZET), ECG08: 388 MS
VENTRICULAR RATE, ECG03: 97 BPM

## 2022-11-13 PROCEDURE — 74011636637 HC RX REV CODE- 636/637: Performed by: NURSE PRACTITIONER

## 2022-11-13 RX ORDER — PREDNISONE 20 MG/1
60 TABLET ORAL
Status: COMPLETED | OUTPATIENT
Start: 2022-11-13 | End: 2022-11-13

## 2022-11-13 RX ORDER — PREDNISONE 10 MG/1
10 TABLET ORAL SEE ADMIN INSTRUCTIONS
Qty: 21 TABLET | Refills: 0 | Status: SHIPPED | OUTPATIENT
Start: 2022-11-13

## 2022-11-13 RX ADMIN — PREDNISONE 60 MG: 20 TABLET ORAL at 01:13

## 2022-11-13 NOTE — ED TRIAGE NOTES
Pt recently seen for respiratory infection, sent home with medication.  Feels like he's getting worse and has now developed CP

## 2022-11-13 NOTE — DISCHARGE INSTRUCTIONS
Thank you! Thank you for allowing me to care for you in the emergency department. It is my goal to provide you with excellent care. If you have not received excellent quality care, please ask to speak to the nurse manager. Please fill out the survey that will come to you by mail or email since we listen to your feedback! Below you will find a list of your tests from today's visit. Should you have any questions, please do not hesitate to call the emergency department. Labs  Recent Results (from the past 12 hour(s))   CBC WITH AUTOMATED DIFF    Collection Time: 11/12/22  9:53 PM   Result Value Ref Range    WBC 10.6 4.1 - 11.1 K/uL    RBC 4.85 4.10 - 5.70 M/uL    HGB 14.8 12.1 - 17.0 g/dL    HCT 40.9 36.6 - 50.3 %    MCV 84.3 80.0 - 99.0 FL    MCH 30.5 26.0 - 34.0 PG    MCHC 36.2 30.0 - 36.5 g/dL    RDW 12.6 11.5 - 14.5 %    PLATELET 501 283 - 420 K/uL    MPV 8.8 (L) 8.9 - 12.9 FL    NRBC 0.0 0.0  WBC    ABSOLUTE NRBC 0.00 0.00 - 0.01 K/uL    NEUTROPHILS 57 32 - 75 %    LYMPHOCYTES 26 12 - 49 %    MONOCYTES 12 5 - 13 %    EOSINOPHILS 3 0 - 7 %    BASOPHILS 1 0 - 1 %    IMMATURE GRANULOCYTES 1 (H) 0 - 0.5 %    ABS. NEUTROPHILS 6.2 1.8 - 8.0 K/UL    ABS. LYMPHOCYTES 2.7 0.8 - 3.5 K/UL    ABS. MONOCYTES 1.2 (H) 0.0 - 1.0 K/UL    ABS. EOSINOPHILS 0.3 0.0 - 0.4 K/UL    ABS. BASOPHILS 0.1 0.0 - 0.1 K/UL    ABS. IMM. GRANS. 0.1 (H) 0.00 - 0.04 K/UL    DF AUTOMATED     METABOLIC PANEL, COMPREHENSIVE    Collection Time: 11/12/22  9:53 PM   Result Value Ref Range    Sodium 138 136 - 145 mmol/L    Potassium 3.9 3.5 - 5.1 mmol/L    Chloride 104 97 - 108 mmol/L    CO2 29 21 - 32 mmol/L    Anion gap 5 5 - 15 mmol/L    Glucose 101 (H) 65 - 100 mg/dL    BUN 8 6 - 20 mg/dL    Creatinine 0.80 0.70 - 1.30 mg/dL    BUN/Creatinine ratio 10 (L) 12 - 20      eGFR >60 >60 ml/min/1.73m2    Calcium 8.9 8.5 - 10.1 mg/dL    Bilirubin, total 0.1 (L) 0.2 - 1.0 mg/dL    AST (SGOT) 22 15 - 37 U/L    ALT (SGPT) 42 12 - 78 U/L    Alk. phosphatase 89 45 - 117 U/L    Protein, total 6.7 6.4 - 8.2 g/dL    Albumin 3.4 (L) 3.5 - 5.0 g/dL    Globulin 3.3 2.0 - 4.0 g/dL    A-G Ratio 1.0 (L) 1.1 - 2.2     TROPONIN-HIGH SENSITIVITY    Collection Time: 11/12/22  9:53 PM   Result Value Ref Range    Troponin-High Sensitivity 9 0 - 76 ng/L   COVID-19 WITH INFLUENZA A/B    Collection Time: 11/12/22  9:53 PM   Result Value Ref Range    SARS-CoV-2 by PCR Not Detected Not Detected      Influenza A by PCR Not Detected Not Detected      Influenza B by PCR Not Detected Not Detected     NT-PRO BNP    Collection Time: 11/12/22 11:10 PM   Result Value Ref Range    NT pro-BNP 34 <125 pg/mL   D DIMER    Collection Time: 11/12/22 11:10 PM   Result Value Ref Range    D DIMER 0.41 <0.50 ug/ml(FEU)       Radiologic Studies  XR CHEST PORT   Final Result      Low lung volumes and bibasilar atelectasis. No pneumothorax. CT Results  (Last 48 hours)      None          CXR Results  (Last 48 hours)                 11/12/22 2120  XR CHEST PORT Final result    Impression:      Low lung volumes and bibasilar atelectasis. No pneumothorax. Narrative:  EXAM:  XR CHEST PORT       INDICATION: Chest pain and shortness of breath. Severe obesity. COMPARISON: Chest views on 11/6/2022 and 6/29/2020. TECHNIQUE: Upright portable chest AP view       FINDINGS: The cardiac silhouette is within normal limits. The pulmonary   vasculature is within normal limits. Lung volumes are low. Bibasilar opacities most likely represent atelectasis. Upper lobes are clear. No pneumothorax. The visualized bones and upper abdomen   are age-appropriate.                 ------------------------------------------------------------------------------------------------------------  The exam and treatment you received in the Emergency Department were for an urgent problem and are not intended as complete care.  It is important that you follow-up with a doctor, nurse practitioner, or physician assistant to:  (1) confirm your diagnosis,  (2) re-evaluation of changes in your illness and treatment, and  (3) for ongoing care. Please take your discharge instructions with you when you go to your follow-up appointment. If you have any problem arranging a follow-up appointment, contact the Emergency Department. If your symptoms become worse or you do not improve as expected and you are unable to reach your health care provider, please return to the Emergency Department. We are available 24 hours a day. If a prescription has been provided, please have it filled as soon as possible to prevent a delay in treatment. If you have any questions or reservations about taking the medication due to side effects or interactions with other medications, please call your primary care provider or contact the ER.

## 2022-11-18 NOTE — ED PROVIDER NOTES
EMERGENCY DEPARTMENT HISTORY AND PHYSICAL EXAM      Date: 11/12/2022  Patient Name: Hayden Aguilera    History of Presenting Illness     Chief Complaint   Patient presents with    Shortness of Breath    Chest Pain       History Provided By: Patient    HPI: Hayden Aguilera, 56 y.o. male with no significant past medical history presents to the emergency room with CC of cough. Patient states he was seen here a couple days ago for URI symptoms, states he feels he is getting worse. He reports continued cough/congestion with ZHENG. He denies any associated CP, cough or fevers. He states he has been using nebulizer as prescribed with minimal improvement. There are no other complaints, changes, or physical findings at this time. PCP: Luis Antonio Moore MD    No current facility-administered medications on file prior to encounter. Current Outpatient Medications on File Prior to Encounter   Medication Sig Dispense Refill    albuterol (Ventolin HFA) 90 mcg/actuation inhaler Take 2 Puffs by inhalation every four (4) hours as needed for Wheezing. 18 g 0    chlorpheniramine-dm (Coricidin HBP Cough and Cold) 4-30 mg tab As directed on box 20 Tablet 0    butalbital-acetaminophen-caff (Fioricet) -40 mg per capsule Take 1 Capsule by mouth every four (4) hours as needed for Headache. 12 Capsule 0    metoprolol succinate (TOPROL-XL) 25 mg XL tablet TAKE 1 TABLET EVERY DAY 90 Tablet 0    lisinopriL (PRINIVIL, ZESTRIL) 10 mg tablet TAKE 1 TABLET EVERY DAY 90 Tablet 0    metroNIDAZOLE (FLAGYL) 500 mg tablet Take 1 Tablet by mouth two (2) times a day.  Indications: an infection due to Trichomonas vaginalis 14 Tablet 0    diclofenac EC (VOLTAREN) 75 mg EC tablet TAKE 1 TABLET TWICE DAILY WITH FOOD AS NEEDED FOR PAIN 60 Tablet 0    azelastine (ASTELIN) 137 mcg (0.1 %) nasal spray USE 2 SPRAYS IN EACH NOSTRIL ONE TIME DAILY 1 Each 2    ondansetron hcl (Zofran) 4 mg tablet Take 1 Tablet by mouth every eight (8) hours as needed for Nausea or Vomiting. 7 Tablet 0    acetaminophen (Tylenol 8 Hour) 650 mg TbER Take 1 Tablet by mouth two (2) times daily as needed for Pain. 180 Tablet 0    albuterol (PROVENTIL HFA, VENTOLIN HFA, PROAIR HFA) 90 mcg/actuation inhaler Take 2 Puffs by inhalation every six (6) hours as needed for Wheezing. 1 Inhaler 3       Past History     Past Medical History:  Past Medical History:   Diagnosis Date    Hand cramps 10/10/2020    Hypercholesterolemia     Hypertension     Impotence 10/10/2020    Increased frequency of urination 10/10/2020    Leukocytosis 10/10/2020    Migraines     Mild intermittent asthma without complication 1/04/2258    Multiple acquired skin tags 10/10/2020    Pain in both hands 10/10/2020    Paresthesia of left upper limb 10/10/2020    Seasonal allergic rhinitis 10/10/2020    Vitamin D deficiency 10/10/2020       Past Surgical History:  Past Surgical History:   Procedure Laterality Date    HX APPENDECTOMY  1993    HX PROSTATE SURGERY      HX UROLOGICAL         Family History:  Family History   Adopted: Yes   Family history unknown: Yes       Social History:  Social History     Tobacco Use    Smoking status: Never    Smokeless tobacco: Never   Substance Use Topics    Alcohol use: No    Drug use: Never       Allergies: Allergies   Allergen Reactions    Keflex [Cephalexin] Anaphylaxis and Shortness of Breath       Review of Systems   Review of Systems   Constitutional: Negative. Negative for chills, fatigue and fever. HENT:  Positive for congestion. Respiratory:  Positive for shortness of breath. Negative for cough. Cardiovascular: Negative. Negative for chest pain, palpitations and leg swelling. Gastrointestinal: Negative. Negative for diarrhea, nausea and vomiting. Genitourinary: Negative. Neurological: Negative. Negative for dizziness and headaches. All other systems reviewed and are negative. Physical Exam   Physical Exam  Vitals and nursing note reviewed. Constitutional:       General: He is not in acute distress. Appearance: Normal appearance. He is not toxic-appearing. HENT:      Head: Normocephalic and atraumatic. Right Ear: Tympanic membrane normal.      Left Ear: Tympanic membrane normal.      Nose: Nose normal.      Mouth/Throat:      Lips: Pink. Mouth: Mucous membranes are moist.      Pharynx: Oropharynx is clear. Uvula midline. Eyes:      Extraocular Movements: Extraocular movements intact. Conjunctiva/sclera: Conjunctivae normal.   Cardiovascular:      Rate and Rhythm: Normal rate and regular rhythm. Heart sounds: Normal heart sounds. Pulmonary:      Effort: Pulmonary effort is normal.      Breath sounds: Normal breath sounds. No wheezing or rales. Abdominal:      General: Bowel sounds are normal.      Palpations: Abdomen is soft. Tenderness: There is no abdominal tenderness. Musculoskeletal:         General: Normal range of motion. Cervical back: Normal range of motion and neck supple. Right lower leg: No edema. Left lower leg: No edema. Skin:     General: Skin is warm and dry. Neurological:      General: No focal deficit present. Mental Status: He is alert. Psychiatric:         Mood and Affect: Mood normal.         Behavior: Behavior normal. Behavior is cooperative.        Lab and Diagnostic Study Results   Labs -     Admission on 11/12/2022, Discharged on 11/13/2022   Component Date Value    Ventricular Rate 11/12/2022 97     Atrial Rate 11/12/2022 97     P-R Interval 11/12/2022 126     QRS Duration 11/12/2022 72     Q-T Interval 11/12/2022 306     QTC Calculation (Bezet) 11/12/2022 388     Calculated P Axis 11/12/2022 26     Calculated R Axis 11/12/2022 25     Calculated T Axis 11/12/2022 39     Diagnosis 11/12/2022                      Value:Normal sinus rhythm  Normal ECG  When compared with ECG of 07-NOV-2022 16:02,  No significant change was found  Confirmed by Woody  (Michael Palmer2) on 11/13/2022 12:51:12 PM      WBC 11/12/2022 10.6     RBC 11/12/2022 4.85     HGB 11/12/2022 14.8     HCT 11/12/2022 40.9     MCV 11/12/2022 84.3     MCH 11/12/2022 30.5     MCHC 11/12/2022 36.2     RDW 11/12/2022 12.6     PLATELET 42/72/1768 605     MPV 11/12/2022 8.8 (A)     NRBC 11/12/2022 0.0     ABSOLUTE NRBC 11/12/2022 0.00     NEUTROPHILS 11/12/2022 57     LYMPHOCYTES 11/12/2022 26     MONOCYTES 11/12/2022 12     EOSINOPHILS 11/12/2022 3     BASOPHILS 11/12/2022 1     IMMATURE GRANULOCYTES 11/12/2022 1 (A)     ABS. NEUTROPHILS 11/12/2022 6.2     ABS. LYMPHOCYTES 11/12/2022 2.7     ABS. MONOCYTES 11/12/2022 1.2 (A)     ABS. EOSINOPHILS 11/12/2022 0.3     ABS. BASOPHILS 11/12/2022 0.1     ABS. IMM. GRANS. 11/12/2022 0.1 (A)     DF 11/12/2022 AUTOMATED     Sodium 11/12/2022 138     Potassium 11/12/2022 3.9     Chloride 11/12/2022 104     CO2 11/12/2022 29     Anion gap 11/12/2022 5     Glucose 11/12/2022 101 (A)     BUN 11/12/2022 8     Creatinine 11/12/2022 0.80     BUN/Creatinine ratio 11/12/2022 10 (A)     eGFR 11/12/2022 >60     Calcium 11/12/2022 8.9     Bilirubin, total 11/12/2022 0.1 (A)     AST (SGOT) 11/12/2022 22     ALT (SGPT) 11/12/2022 42     Alk. phosphatase 11/12/2022 89     Protein, total 11/12/2022 6.7     Albumin 11/12/2022 3.4 (A)     Globulin 11/12/2022 3.3     A-G Ratio 11/12/2022 1.0 (A)     Troponin-High Sensitivity 11/12/2022 9     SARS-CoV-2 by PCR 11/12/2022 Not Detected     Influenza A by PCR 11/12/2022 Not Detected     Influenza B by PCR 11/12/2022 Not Detected     NT pro-BNP 11/12/2022 34     D DIMER 11/12/2022 0.41      EKG interpreted by me. Shows Normal Sinus rhythm with a HR of 87. No ST elevations or depressions concerning for ischemia. Normal intervals.     Burnis Osler, NP          Radiologic Studies -   XR Results (most recent):  Results from Hospital Encounter encounter on 11/12/22    XR CHEST PORT    Narrative  EXAM:  XR CHEST PORT    INDICATION: Chest pain and shortness of breath. Severe obesity. COMPARISON: Chest views on 11/6/2022 and 6/29/2020. TECHNIQUE: Upright portable chest AP view    FINDINGS: The cardiac silhouette is within normal limits. The pulmonary  vasculature is within normal limits. Lung volumes are low. Bibasilar opacities most likely represent atelectasis. Upper lobes are clear. No pneumothorax. The visualized bones and upper abdomen  are age-appropriate. Impression  Low lung volumes and bibasilar atelectasis. No pneumothorax. Medical Decision Making and ED Course   Differential Diagnosis & Medical Decision Making Provider Note:   Patient presents with SOB. DDx: COPD/Asthma exacerbation, Bronchitis, CHF exacerbation, ACS, PE, PNA, PTX, Anxiety. Will get labs and cxr and ekg. - I am the first provider for this patient. I reviewed the vital signs, available nursing notes, past medical history, past surgical history, family history and social history. The patients presenting problems have been discussed, and they are in agreement with the care plan formulated and outlined with them. I have encouraged them to ask questions as they arise throughout their visit. Vital Signs-Reviewed the patient's vital signs. See chart    ED Course:   ED Course as of 11/17/22 2229   Sat Nov 12, 2022   2140 CXR negative acute findings [LP]   2215 Lab unremarkable- no leukocytosis, CMP normal, troponin normal [LP]   2345 Ddimer and BNP normal.  [LP]   Sun Nov 13, 2022   0000 Discussed results and plan of care with pt; will obtain walking pulse ox [LP]   0030 Patient ambulating in the ED without difficulty, maintaining room air sats 97% well discharge as planned with additional Rx prednisone Dosepak. Patient advised to continue medication previously prescribed. Reassurance given. PCP follow-up [LP]      ED Course User Index  [LP] Shanelle Russell NP       Disposition   Disposition: Condition stable  DC- Adult Discharges:  All of the diagnostic tests were reviewed and questions answered. Diagnosis, care plan and treatment options were discussed. The patient understands the instructions and will follow up as directed. The patients results have been reviewed with them. They have been counseled regarding their diagnosis. The patient verbally convey understanding and agreement of the signs, symptoms, diagnosis, treatment and prognosis and additionally agrees to follow up as recommended with their PCP in 24 - 48 hours. They also agree with the care-plan and convey that all of their questions have been answered. I have also put together some discharge instructions for them that include: 1) educational information regarding their diagnosis, 2) how to care for their diagnosis at home, as well a 3) list of reasons why they would want to return to the ED prior to their follow-up appointment, should their condition change. DC-The patient was given verbal follow-up instructions  DC- Pain Control DC Home plan: Nonsteroidals, Tylenol, Referral Family Medicine/PCP, and Advised to return for worsening or additional problems such as abdominal or chest pain    DISCHARGE PLAN:  1. Cannot display discharge medications since this patient is not currently admitted. 2.   Follow-up Information       Follow up With Specialties Details Why Contact Info    Grace Goncalves MD Internal Medicine Physician Schedule an appointment as soon as possible for a visit in 2 days for ER follow up [address] [place] [address],[facility]  [phone]            3. Return to ED if worse   4. Discharge Medication List as of 11/13/2022  1:12 AM        START taking these medications    Details   predniSONE (STERAPRED DS) 10 mg dose pack Take 1 Tablet by mouth See Admin Instructions. , Normal, Disp-21 Tablet, R-0           CONTINUE these medications which have NOT CHANGED    Details   !! albuterol (Ventolin HFA) 90 mcg/actuation inhaler Take 2 Puffs by inhalation every four (4) hours as needed for Wheezing., Normal, Disp-18 g, R-0      promethazine-dextromethorphan (PROMETHAZINE-DM) 6.25-15 mg/5 mL syrup Take 5 mL by mouth every four (4) hours as needed for Cough for up to 7 days. , Normal, Disp-120 mL, R-0      chlorpheniramine-dm (Coricidin HBP Cough and Cold) 4-30 mg tab As directed on box, Normal, Disp-20 Tablet, R-0      butalbital-acetaminophen-caff (Fioricet) -40 mg per capsule Take 1 Capsule by mouth every four (4) hours as needed for Headache., Normal, Disp-12 Capsule, R-0      metoprolol succinate (TOPROL-XL) 25 mg XL tablet TAKE 1 TABLET EVERY DAY, Normal, Disp-90 Tablet, R-0      lisinopriL (PRINIVIL, ZESTRIL) 10 mg tablet TAKE 1 TABLET EVERY DAY, Normal, Disp-90 Tablet, R-0      metroNIDAZOLE (FLAGYL) 500 mg tablet Take 1 Tablet by mouth two (2) times a day. Indications: an infection due to Trichomonas vaginalis, Normal, Disp-14 Tablet, R-0      diclofenac EC (VOLTAREN) 75 mg EC tablet TAKE 1 TABLET TWICE DAILY WITH FOOD AS NEEDED FOR PAIN, Normal, Disp-60 Tablet, R-0      azelastine (ASTELIN) 137 mcg (0.1 %) nasal spray USE 2 SPRAYS IN EACH NOSTRIL ONE TIME DAILY, Normal, Disp-1 Each, R-2      ondansetron hcl (Zofran) 4 mg tablet Take 1 Tablet by mouth every eight (8) hours as needed for Nausea or Vomiting., Normal, Disp-7 Tablet, R-0      acetaminophen (Tylenol 8 Hour) 650 mg TbER Take 1 Tablet by mouth two (2) times daily as needed for Pain., Normal, Disp-180 Tablet, R-0      !! albuterol (PROVENTIL HFA, VENTOLIN HFA, PROAIR HFA) 90 mcg/actuation inhaler Take 2 Puffs by inhalation every six (6) hours as needed for Wheezing., Normal, Disp-1 Inhaler, R-3       !! - Potential duplicate medications found. Please discuss with provider. Diagnosis/Clinical Impression     Clinical Impression:   1. Upper respiratory tract infection, unspecified type        Attestations: I, Lorene Navraro, NP, am the primary clinician of record.     Please note that this dictation was completed with Dragon, the computer voice recognition software. Quite often unanticipated grammatical, syntax, homophones, and other interpretive errors are inadvertently transcribed by the computer software. Please disregard these errors. Please excuse any errors that have escaped final proofreading. Thank you.

## 2022-11-21 ENCOUNTER — PATIENT OUTREACH (OUTPATIENT)
Dept: CASE MANAGEMENT | Age: 47
End: 2022-11-21

## 2022-11-21 NOTE — PROGRESS NOTES
Ambulatory Care Management Note    Date/Time:  11/21/2022 2:21 PM    This patient was received as a referral from [employer]. Ambulatory Care Manager outreached to patient today to offer care management services. Introduction to self and role of care manager provided. Patient accepted care management services at this time. Follow up call scheduled at this time.   Patient has Ambulatory Care Manager's contact number for any questions or concerns.   Gene Aaron

## 2022-12-04 PROBLEM — E66.01 OBESITY, CLASS III, BMI 40-49.9 (MORBID OBESITY) (HCC): Status: ACTIVE | Noted: 2022-01-16

## 2022-12-04 PROBLEM — E66.813 OBESITY, CLASS III, BMI 40-49.9 (MORBID OBESITY) (HCC): Status: ACTIVE | Noted: 2022-01-16

## 2022-12-07 ENCOUNTER — OFFICE VISIT (OUTPATIENT)
Dept: INTERNAL MEDICINE CLINIC | Age: 47
End: 2022-12-07
Payer: MEDICARE

## 2022-12-07 VITALS
WEIGHT: 315 LBS | HEART RATE: 84 BPM | OXYGEN SATURATION: 99 % | DIASTOLIC BLOOD PRESSURE: 60 MMHG | SYSTOLIC BLOOD PRESSURE: 104 MMHG | HEIGHT: 66 IN | BODY MASS INDEX: 50.62 KG/M2 | RESPIRATION RATE: 18 BRPM

## 2022-12-07 DIAGNOSIS — J45.20 MILD INTERMITTENT ASTHMA WITHOUT COMPLICATION: ICD-10-CM

## 2022-12-07 DIAGNOSIS — Z86.69 HX OF BELL'S PALSY: ICD-10-CM

## 2022-12-07 DIAGNOSIS — E55.9 VITAMIN D DEFICIENCY: ICD-10-CM

## 2022-12-07 DIAGNOSIS — E78.5 DYSLIPIDEMIA: ICD-10-CM

## 2022-12-07 DIAGNOSIS — J30.2 SEASONAL ALLERGIC RHINITIS, UNSPECIFIED TRIGGER: ICD-10-CM

## 2022-12-07 DIAGNOSIS — R06.83 SNORING: ICD-10-CM

## 2022-12-07 DIAGNOSIS — I10 ESSENTIAL HYPERTENSION: ICD-10-CM

## 2022-12-07 DIAGNOSIS — Z11.59 NEED FOR HEPATITIS C SCREENING TEST: ICD-10-CM

## 2022-12-07 DIAGNOSIS — E66.01 MORBID OBESITY DUE TO EXCESS CALORIES (HCC): ICD-10-CM

## 2022-12-07 DIAGNOSIS — Z12.11 SCREENING FOR COLON CANCER: ICD-10-CM

## 2022-12-07 PROCEDURE — 99214 OFFICE O/P EST MOD 30 MIN: CPT | Performed by: INTERNAL MEDICINE

## 2022-12-07 PROCEDURE — G8754 DIAS BP LESS 90: HCPCS | Performed by: INTERNAL MEDICINE

## 2022-12-07 PROCEDURE — G8510 SCR DEP NEG, NO PLAN REQD: HCPCS | Performed by: INTERNAL MEDICINE

## 2022-12-07 PROCEDURE — G8752 SYS BP LESS 140: HCPCS | Performed by: INTERNAL MEDICINE

## 2022-12-07 PROCEDURE — G8417 CALC BMI ABV UP PARAM F/U: HCPCS | Performed by: INTERNAL MEDICINE

## 2022-12-07 PROCEDURE — G8427 DOCREV CUR MEDS BY ELIG CLIN: HCPCS | Performed by: INTERNAL MEDICINE

## 2022-12-07 RX ORDER — ALBUTEROL SULFATE 90 UG/1
2 AEROSOL, METERED RESPIRATORY (INHALATION)
Qty: 1 EACH | Refills: 4 | Status: SHIPPED | OUTPATIENT
Start: 2022-12-07

## 2022-12-07 NOTE — PROGRESS NOTES
Karma Alvarez (: 1975) is a 56 y.o. male, established patient, here for evaluation of the following chief complaint(s):  Follow Up Chronic Condition         ASSESSMENT/PLAN:  Below is the assessment and plan developed based on review of pertinent history, physical exam, labs, studies, and medications. 1. Essential hypertension  -     CBC WITH AUTOMATED DIFF  -     METABOLIC PANEL, COMPREHENSIVE  2. Mild intermittent asthma without complication  -     REFERRAL TO ALLERGY  3. BMI 50.0-59.9, adult ([facility] [address]  4. Dyslipidemia  -     LIPID PANEL  5. Snoring  -     REFERRAL TO PULMONARY DISEASE  6. Vitamin D deficiency  -     VITAMIN D, 25 HYDROXY  7. Seasonal allergic rhinitis, unspecified trigger  8. Need for hepatitis C screening test  -     HEPATITIS C AB  9. Screening for colon cancer  -     REFERRAL TO GASTROENTEROLOGY  10. Hx of Bell's palsy  11. Morbid obesity due to excess calories (HCC)    Hypertension blood pressure running on lower side of normal range. He has recurrent some throat related complaints with difficulty in breathing recommended to stop taking lisinopril and continued on metoprolol ER 25 mg/day    He should come in 4 weeks to see my colleague  To have blood pressure monitoring. Asthma mild intermittent and he had repeated ER visit. Also he says he had visited ER due to history of fall and nausea and he was given inhaler. EKG was unremarkable. Chest x-ray was unremarkable. He has BMI 51.65    Strongly recommended to lose weight. Which will help in control of shortness of breath    He will need sleep apnea evaluation. Refer him to pulmonologist today. His wife does notice that he is snoring. He says that he wants to be tested for allergies so I referred him to allergy specialist.    He will need pulmonary function test having asthma. Dyslipidemia fasting lipid profile ordered. I have referred him to gastroenterologist for screening colon cancer.   No personal or family history of colon cancer. If he loses weight it will help him on long time to prevent repeated ER visit and overall improving his generalized wellbeing. He does not have any mental health problems. Recommended to get booster dose of COVID-vaccine and Pneumovax 23 vaccine. Healthy eating habits and calorie restricted and disciplined diet. Labs ordered for vitamin D deficiency. Follow-up in 4 weeks. Return for bp lower side . off from lisinopril ,f/u with my collegue. SUBJECTIVE/OBJECTIVE:    HPI:    Mr. Nestor Javier came for follow-up. Last visit was in September 2021. He thinks that he has been in this year with me however he had no-show and cancel his appointment. I reviewed epic system and he has multiple visits to the emergency room for history of fall and he says he had fallen in the hospital and also history of shortness of breathAnd chest pain. Most recent visit was on 13th November. He had visited emergency room in January 2021 then he visited emergency room in February 24 2022. Visited on 22 March 2022. Visited emergency room on August 18, 2022 and November 6, November 7 and November 13, 2022. He had detailed work-up done and he was negative for flu, BNP was negative,  COVID was negative in November 2022. His blood pressure is running on lower side of normal range and he has not been able to describe his symptoms regarding something feeling in the throat so I will take him off from lisinopril as such also because of blood pressure running on lower side when checked twice manually. He does not have any nausea vomiting or dizziness. He has now taken pneumonia vaccine. He has not done colonoscopy prefers Cologuard knows co-pay and he opted out for Cologuard. He is due for screening lipid profile also. He agreed to do labs. He wants to do next week and explained that I am on vacation. He has no mental health problems or depression.   His wife and family member follows me. He needs refills for inhalers. His wife notices that he has snoring and he wants to do allergy testing. Review of Systems   Constitutional: Negative. HENT:  Negative for sinus pain and sore throat. Eyes:  Negative for blurred vision. Respiratory:  Negative for cough and wheezing. Cardiovascular: Negative. Gastrointestinal: Negative. Genitourinary: Negative. Musculoskeletal: Negative. Neurological:  Negative for dizziness, tingling, tremors and headaches. Psychiatric/Behavioral: Negative. Vitals:    12/07/22 1435 12/07/22 1458   BP: 102/60 104/60   Pulse: 89 84   Resp: 18    SpO2: 99%    Weight: 320 lb (145.2 kg)    Height: 5' 6\" (1.676 m)       Physical Exam  Vitals and nursing note reviewed. Constitutional:       General: He is not in acute distress. Appearance: He is obese. He is not ill-appearing, toxic-appearing or diaphoretic. HENT:      Mouth/Throat:      Mouth: Mucous membranes are moist.   Eyes:      Pupils: Pupils are equal, round, and reactive to light. Cardiovascular:      Rate and Rhythm: Normal rate and regular rhythm. Pulses: Normal pulses. Heart sounds: Normal heart sounds. No murmur heard. Pulmonary:      Effort: Pulmonary effort is normal.      Breath sounds: Normal breath sounds. No rhonchi or rales. Abdominal:      General: Bowel sounds are normal. There is no distension. Palpations: Abdomen is soft. Tenderness: There is no abdominal tenderness. Musculoskeletal:         General: Normal range of motion. Cervical back: Neck supple. Right lower leg: No edema. Left lower leg: No edema. Skin:     General: Skin is warm. Findings: No lesion or rash. Neurological:      General: No focal deficit present. Mental Status: He is alert and oriented to person, place, and time. Mental status is at baseline. Cranial Nerves: No cranial nerve deficit.    Psychiatric:         Mood and Affect: Mood normal.         Behavior: Behavior normal.       On this date 12/07/2022 I have spent 35 minutes reviewing previous notes, test results and face to face with the patient discussing the diagnosis and importance of compliance with the treatment plan as well as documenting on the day of the visit.     Signed by:  Angela Bob MD

## 2022-12-07 NOTE — PROGRESS NOTES
Chief Complaint   Patient presents with    Follow Up Chronic Condition           Visit Vitals  /60 (BP 1 Location: Left upper arm, BP Patient Position: Sitting)   Pulse 89   Resp 18   Ht 5' 6\" (1.676 m)   Wt 320 lb (145.2 kg)   SpO2 99%   BMI 51.65 kg/m²           1. \"Have you been to the ER, urgent care clinic since your last visit? Hospitalized since your last visit? \"  11/22 URI     2. \"Have you seen or consulted any other health care providers outside of the [address] [place] [address] since your last visit? \" No     3. For patients aged 39-70: Has the patient had a colonoscopy / FIT/ Cologuard? No      If the patient is female:    4. For patients aged 41-77: Has the patient had a mammogram within the past 2 years? NA - based on age or sex      11. For patients aged 21-65: Has the patient had a pap smear?  NA - based on age or sex

## 2022-12-28 ENCOUNTER — PATIENT OUTREACH (OUTPATIENT)
Dept: CASE MANAGEMENT | Age: 47
End: 2022-12-28

## 2022-12-28 NOTE — LETTER
12/30/2022 2:35 PM    Mr. Roscoe Fernandez  [address] [place] [address] [facility] [address]          Mr Sapna Perez,        My name is Vanita Bell. I am a Care Manager with [facility]. I often work with patients who could benefit from additional support understanding and managing their health. We are committed to providing you excellent care. I have been unable to reach you on this at [phone]. Please contact me at [phone] if you would like additional help with community resources. We appreciate the confidence you've shown by selecting us to provide your healthcare needs and I look forward to hearing from you soon.          [facility],  Vanita Bell, BSN, RN - Ambulatory

## 2022-12-30 NOTE — PROGRESS NOTES
Ambulatory Care Management Note        Date/Time:  12/30/2022 2:38 PM    This patient was received as a referral from  [address] [place] [address] for case management services. Multiple unsuccessful attempts have been made to contact this patient. Ambulatory Care Management Get-In-Touch (GIT) letter will be mailed to the patient's address on file at this time (not able to send GIT letter via [facility] message as pt doesn't currently have active [facility] status).   ACM will monitor for a response from this pt over the next 2wks.   /benjamin

## 2023-01-03 PROBLEM — Z12.11 SCREENING FOR COLON CANCER: Status: RESOLVED | Noted: 2022-04-26 | Resolved: 2023-01-03

## 2023-01-09 ENCOUNTER — OFFICE VISIT (OUTPATIENT)
Dept: INTERNAL MEDICINE CLINIC | Age: 48
End: 2023-01-09
Payer: MEDICARE

## 2023-01-09 VITALS
DIASTOLIC BLOOD PRESSURE: 84 MMHG | RESPIRATION RATE: 18 BRPM | TEMPERATURE: 98.2 F | WEIGHT: 315 LBS | OXYGEN SATURATION: 94 % | SYSTOLIC BLOOD PRESSURE: 136 MMHG | HEIGHT: 66 IN | BODY MASS INDEX: 50.62 KG/M2 | HEART RATE: 92 BPM

## 2023-01-09 DIAGNOSIS — Z71.89 ADVANCED DIRECTIVES, COUNSELING/DISCUSSION: ICD-10-CM

## 2023-01-09 DIAGNOSIS — R06.83 SNORING: ICD-10-CM

## 2023-01-09 DIAGNOSIS — G47.00 INSOMNIA, UNSPECIFIED TYPE: ICD-10-CM

## 2023-01-09 DIAGNOSIS — I10 ESSENTIAL HYPERTENSION: Primary | ICD-10-CM

## 2023-01-09 DIAGNOSIS — E66.01 MORBID OBESITY WITH BMI OF 50.0-59.9, ADULT (HCC): ICD-10-CM

## 2023-01-09 PROCEDURE — 99214 OFFICE O/P EST MOD 30 MIN: CPT | Performed by: STUDENT IN AN ORGANIZED HEALTH CARE EDUCATION/TRAINING PROGRAM

## 2023-01-09 PROCEDURE — G8510 SCR DEP NEG, NO PLAN REQD: HCPCS | Performed by: STUDENT IN AN ORGANIZED HEALTH CARE EDUCATION/TRAINING PROGRAM

## 2023-01-09 PROCEDURE — G8417 CALC BMI ABV UP PARAM F/U: HCPCS | Performed by: STUDENT IN AN ORGANIZED HEALTH CARE EDUCATION/TRAINING PROGRAM

## 2023-01-09 PROCEDURE — G8427 DOCREV CUR MEDS BY ELIG CLIN: HCPCS | Performed by: STUDENT IN AN ORGANIZED HEALTH CARE EDUCATION/TRAINING PROGRAM

## 2023-01-09 NOTE — PROGRESS NOTES
Julieta Cleaning (: 1975) is a 56 y.o. male, established patient, here for evaluation of the following chief complaint(s):  Follow-up and Hypertension       ASSESSMENT/PLAN:  Below is the assessment and plan developed based on review of pertinent history, physical exam, labs, studies, and medications. 1. Essential hypertension  2. Snoring  3. Morbid obesity with BMI of 50.0-59.9, adult ([address]  4. Insomnia, unspecified type  5. Advanced directives, counseling/discussion  -     REFERRAL TO ACP CLINICAL SPECIALIST    Hypertension: Blood pressures controlled, continue metoprolol. Patient was told to monitor blood pressures at home. Reprinted referral to pulmonology and given to patient for sleep apnea evaluation. He was recommended to take over-the-counter melatonin for insomnia. Morbid obesity: Counseled on exercise as well as diet. Patient wanted a referral to discuss about medical directive, referral to ACP given. Return in about 3 months (around 2023) for follow up with pcp . SUBJECTIVE/OBJECTIVE:  HPI    Julieta Cleaning is a 50-year-old who presents to the clinic for follow-up on his hypertension. PCP: Dr. Mya Lugo     He was seen by his PCP last month, and his blood pressure was found to be running on the lower side. He was recommended to stop taking lisinopril and continue with only metoprolol. Has not been checking blood pressures at home. Blood work was ordered during last visit, patient states he got it done today. He has an appointment next month with GI as well as the allergist.  Was also given an appointment to pulmonology for sleep apnea evaluation, states that he never got that appointment. He also complains of insomnia. Has not been doing any exercise, does try to eat healthy.     Allergies   Allergen Reactions    Keflex [Cephalexin] Anaphylaxis and Shortness of Breath     Current Outpatient Medications   Medication Sig    metoprolol succinate (TOPROL-XL) 25 mg XL tablet TAKE 1 TABLET EVERY DAY    acetaminophen (Tylenol 8 Hour) 650 mg TbER Take 1 Tablet by mouth two (2) times daily as needed for Pain. No current facility-administered medications for this visit. Past Medical History:   Diagnosis Date    Hand cramps 10/10/2020    Hypercholesterolemia     Hypertension     Impotence 10/10/2020    Increased frequency of urination 10/10/2020    Leukocytosis 10/10/2020    Migraines     Mild intermittent asthma without complication 24/15/3180    Morbid obesity with BMI of 50.0-59.9, adult ([address]     Multiple acquired skin tags 10/10/2020    Pain in both hands 10/10/2020    Paresthesia of left upper limb 10/10/2020    Seasonal allergic rhinitis 10/10/2020    Vitamin D deficiency 10/10/2020     Past Surgical History:   Procedure Laterality Date    HX APPENDECTOMY  1993    HX PROSTATE SURGERY      HX UROLOGICAL       Family History   Adopted: Yes   Family history unknown: Yes     Social History     Tobacco Use   Smoking Status Never   Smokeless Tobacco Never         Review of Systems   Constitutional:  Negative for fever and malaise/fatigue. HENT:  Negative for congestion, hearing loss and sore throat. Eyes: Negative. Respiratory:  Negative for cough and shortness of breath. Cardiovascular:  Negative for chest pain, palpitations and leg swelling. Gastrointestinal:  Negative for abdominal pain, nausea and vomiting. Genitourinary: Negative. Musculoskeletal:  Negative for joint pain. Skin: Negative. Neurological:  Negative for focal weakness, loss of consciousness and headaches. Psychiatric/Behavioral:  The patient has insomnia. Vitals:    01/09/23 0953   BP: 136/84   Pulse: 92   Resp: 18   Temp: 98.2 °F (36.8 °C)   TempSrc: Oral   SpO2: 94%   Weight: 320 lb (145.2 kg)   Height: 5' 6\" (1.676 m)      Physical Exam  Constitutional:       General: He is not in acute distress. Appearance: Normal appearance. He is obese.    HENT: Head: Normocephalic. Eyes:      Extraocular Movements: Extraocular movements intact. Conjunctiva/sclera: Conjunctivae normal.      Pupils: Pupils are equal, round, and reactive to light. Cardiovascular:      Rate and Rhythm: Normal rate and regular rhythm. Heart sounds: Normal heart sounds. Pulmonary:      Effort: Pulmonary effort is normal.      Breath sounds: Normal breath sounds. Abdominal:      General: Bowel sounds are normal. There is no distension. Palpations: Abdomen is soft. Tenderness: There is no abdominal tenderness. Musculoskeletal:      Cervical back: Normal range of motion and neck supple. Neurological:      General: No focal deficit present. Mental Status: He is alert. Mental status is at baseline. Motor: No weakness. Psychiatric:         Mood and Affect: Mood normal.         Behavior: Behavior normal.             An electronic signature was used to authenticate this note.   -- Zonia Montoya MD

## 2023-01-09 NOTE — PROGRESS NOTES
1. \"Have you been to the ER, urgent care clinic since your last visit? Hospitalized since your last visit? \" No    2. \"Have you seen or consulted any other health care providers outside of the [address] [place] [address] since your last visit? \" No     3. For patients aged 39-70: Has the patient had a colonoscopy / FIT/ Cologuard? No    Pt states that he has an appointment with Dr. Franklyn Hermosillo in Feb regarding a colonoscopy. If the patient is female:    4. For patients aged 41-77: Has the patient had a mammogram within the past 2 years? NA - based on age or sex      11. For patients aged 21-65: Has the patient had a pap smear?  NA - based on age or sex    Chief Complaint   Patient presents with    Follow-up    Hypertension     Visit Vitals  /84 (BP 1 Location: Right upper arm, BP Patient Position: Sitting, BP Cuff Size: Adult)   Pulse 92   Temp 98.2 °F (36.8 °C) (Oral)   Resp 18   Ht 5' 6\" (1.676 m)   Wt 320 lb (145.2 kg)   SpO2 94%   BMI 51.65 kg/m²

## 2023-01-10 ENCOUNTER — DOCUMENTATION ONLY (OUTPATIENT)
Dept: CASE MANAGEMENT | Age: 48
End: 2023-01-10

## 2023-01-10 NOTE — ACP (ADVANCE CARE PLANNING)
Advance Care Planning   Ambulatory ACP Specialist Patient Outreach    Date:  1/10/2023    ACP Specialist:  Maru Meehan LMSW    Outreach call to patient in follow-up to ACP Specialist referral from:    [] PCP  [x] Provider   [] Ambulatory Care Management [] Other     For:                  [x] Advance Directive Assistance              [] Complete Portable DNR order              [] Complete POST/MOST              [] Code Status Discussion             [] Discuss Goals of Care             [] Early ACP Decision-Making              [] Other (Specify)    Date Referral Received:1/9/2023    Today's Outreach:  [x] First   [] Second  [] Third       Third outreach made by: [] Phone  [] Email / mail    [] MyChart     Intervention:  [x] Spoke with Patient   [] Left VM requesting return call      Outcome:SW called and spoke with pt who said that he's really been praying about what he wants for his life. Pt said that he thinks he knows what his advance care planning wishes are but he would love to have a conversation with a specialist to make sure he gets them in writing correctly. Pt is also interested in possibly completing a DDNR. ACP info e-mailed to pt and appt scheduled for 1/23 @ 11AM.    Next Step:   [x] ACP scheduled conversation  [] Outreach again in one week               [x] Email / Mail [address]  [x] Email / Mail Advance Directive   [] Closing referral.  Routing closure to referring provider/staff and to ACP Specialist . [] Closure letter mailed to patient with invitation to contact ACP Specialist if / when ready.   Thank you for this referral.

## 2023-01-23 ENCOUNTER — DOCUMENTATION ONLY (OUTPATIENT)
Dept: CASE MANAGEMENT | Age: 48
End: 2023-01-23

## 2023-01-23 NOTE — ACP (ADVANCE CARE PLANNING)
Advance Care Planning   Ambulatory ACP Specialist Patient Outreach    Date:  1/23/2023    ACP Specialist:  Yaa Rae LCSW    Outreach call to patient in follow-up to ACP Specialist referral from:    [] PCP  [x] Provider   [] Ambulatory Care Management [] Other     For:                  [x] Advance Directive Assistance              [] Complete Portable DNR order              [] Complete POST/MOST              [] Code Status Discussion             [] Discuss Goals of Care             [] Early ACP Decision-Making              [] Other (Specify)    Date Referral Received: 01/09/2023    Today's Outreach:  [] First   [x] Second  [] Third       Third outreach made by: [] Phone  [] Email / mail    [] [employer]     Intervention:  [x] Spoke with Patient's Spouse  [] Left VM requesting return call      Outcome: ACP Specialist called patient for scheduled ACP appointment today. Pt's spouse answered and reported that patient was not home at the scheduled appointment time. Request made for patient to return call to reschedule appointment. ACP Specialist will follow up with patient in 1 week to see if he wants to reschedule appointment. Next Step:   [] ACP scheduled conversation  [x] Outreach again in one week               [] Email / Mail ACP Info Sheets  [] Email / Mail Advance Directive   [] Closing referral.  Routing closure to referring provider/staff and to ACP Specialist . [] Closure letter mailed to patient with invitation to contact ACP Specialist if / when ready. Thank you for this referral.     Michael Batres.  JAYLA Cui  Advance Care   [phone]

## 2023-01-25 DIAGNOSIS — E78.00 PURE HYPERCHOLESTEROLEMIA: ICD-10-CM

## 2023-01-25 DIAGNOSIS — E55.9 VITAMIN D DEFICIENCY: Primary | ICD-10-CM

## 2023-01-25 RX ORDER — PRAVASTATIN SODIUM 20 MG/1
20 TABLET ORAL
Qty: 90 TABLET | Refills: 1 | Status: SHIPPED | OUTPATIENT
Start: 2023-01-25

## 2023-01-25 RX ORDER — ERGOCALCIFEROL 1.25 MG/1
50000 CAPSULE ORAL
Qty: 12 CAPSULE | Refills: 0 | Status: SHIPPED | OUTPATIENT
Start: 2023-01-25 | End: 2023-04-25

## 2023-02-02 ENCOUNTER — DOCUMENTATION ONLY (OUTPATIENT)
Dept: CASE MANAGEMENT | Age: 48
End: 2023-02-02

## 2023-02-02 NOTE — ACP (ADVANCE CARE PLANNING)
Advance Care Planning   Ambulatory ACP Specialist Patient Outreach    Date:  2/2/2023    ACP Specialist:  Leidy Hurtado LCSW    Outreach call to patient in follow-up to ACP Specialist referral from:    [] PCP  [x] Provider   [] Ambulatory Care Management [] Other     For:                  [x] Advance Directive Assistance              [] Complete Portable DNR order              [] Complete POST/MOST              [] Code Status Discussion             [] Discuss Goals of Care             [] Early ACP Decision-Making              [] Other (Specify)    Date Referral Received:01/09/2023    Today's Outreach:  [] First   [] Second  [x] Third       Third outreach made by: [] Phone  [] Email / mail    [] [employer]     Intervention:  [] Spoke with Patient   [x] Left VM requesting return call      Outcome: ACP Specialist made a follow up call to patient to see if he wanted to reschedule his ACP appointment since he was not home when ACP Specialist called him for last scheduled appointment. Left voice message asking him to call back to reschedule appointment if he was still interested in ACP discussion. We will close this referral and will reopen if he calls back requesting new, rescheduled appointment. Next Step:   [] ACP scheduled conversation  [] Outreach again in one week               [] Email / Mail ACP Info Sheets  [] Email / Mail Advance Directive   [x] Closing referral.  Routing closure to referring provider/staff and to ACP Specialist . [] Closure letter mailed to patient with invitation to contact ACP Specialist if / when ready. Thank you for this referral.     Moon Dillard.  JAYLA Calderon  [facility]   [phone]

## 2023-02-04 RX ORDER — METOPROLOL SUCCINATE 25 MG/1
TABLET, EXTENDED RELEASE ORAL
Qty: 90 TABLET | Refills: 0 | Status: SHIPPED | OUTPATIENT
Start: 2023-02-04

## 2023-02-20 NOTE — PROGRESS NOTES
[address], [facility], [address] [place] [address]  Ph: [phone]    Fax: [phone]    Initial Evaluation/Plan of Care/Statement of Necessity for Physical Therapy Services     Patient name: Jeremiah Hart    Date/Start of Care:10/11/2021  : 1975  [x]  Patient  Verified  Provider#: [id number]          Referral source: Erica Bridges MD    Return visit to MD: 2022     Medical/Treatment Diagnosis: Pain in right knee [M25.561]  Other chronic pain [G89.29]    Payor: [place] / Plan: [address] [place] [address] HMO / Product Type: Managed Care Medicare /       Prior Hospitalization: see medical history     Comorbidities: see chart   Prior Level of Function: Independent   Medications: Verified on Patient Summary List          Patient / Family readiness to learn indicated by: asking questions, trying to perform skills and interest  Persons(s) to be included in education: patient (P)  Barriers to Learning/Limitations: None  Patient Self Reported Health Status: fair  Rehabilitation Potential: fair  Previous Treatment/Compliance: many years ago for same knee joint. PMHx/Surgical Hx: high blood pressure  Work Hx: Working F/T   Living Situation: Live with his wife, in a second floor apartment, with 8 steps to enter. Barriers to progress: chrnoic of condition slight self limting. Motivation: good  Substance use: n/a  Cognition: A & O x 4  Onset Date: ~ 1 month ago. SUBJECTIVE  Patient was referred to therapy due to chronic R knee pain , that started about a year ago but 2-3 months ago it get worse. He has difficulty getting up from a chair after sitting down >3 min and reports having numbness, coldness sensation on the B thigh/kness. No recent imaging were done and MD referred PT. The pain is localized to front and latera side of R knee joint and he c/o dull pain, stiffness and popping when he gets up. No report of injuries or falls. Activities that procedure the pain: sitting/standing> 3, walking, going up/down steps and laying in bed . Activities that make it better: medicine. Reports functional limitations sit<->stand transfers, doing his work duties, cleaning and going up/down the steps and walking. Also reports pain behind his R knee with wallking and pain if he bends his knee. Goal: \" To learn what I can do at home to ease the pain and make it better\". Mechanism of Injury: unknown  Area of pain: front of knee joint. Pain Descriptors:  Dull and stiffness  Pain Level (0-10 scale)  At rest: 7/10  With activity:8/10    Worst: 10/10   Least: 5/10  Objective/Functional Measures including ROM/MMT:   Physical Findings   Ortho:   Posture:  L side lean and avoid putting WB on R LE, R foot ER   Gait and Functional Mobility:  WBOS, R leg slight varus  WBS:  Avoid placing WB  On R foot   Palpation: TTP lateral /anterior joint line. Swelling: (L) knee mid joint 49.5 / (R) 50cm knee mid joint line. Gross findings:  Over margarita, Tight hamstrings, standing limited to 2-3 min due to R knee pain. Had to sit during assessment. Specific joints: *normal values in ()  KNEE        AROM          PROM                       MMT   R L R L R L   Extension (0)  -3 0   4-  4   Flexion (145) 97  112   3-p! With bending  4   Patellar Mobility:  Limited with lateral glides on R knee joint  Additional comments: pain in supine at knee joint mild swelling   SLR pain with R knee and quad tendon no lag noted but less then 1/2 range due to weakness and pain. 90-90 HS test   (L) hamstring 70 deg   (R) hamstring - unable to get into 90-90 position due to pain. HIP     AROM       PROM             MMT   R L R L R L   Flexion (120)     4-p! Thigh/knee 4   Extension (15)         Abduction (40)     4-  4   Adduction (30)         IR (40)         ER (40)         Additional comments:  Decline to lay prone reports difficulty.     ANKLE AROM                     PROM                     MMT:   R L R L R L   Dorsiflexion (15)      4+ 4+   Plantarflexion (50)     4 4   Inversion (35)          Eversion (25)         Additional comments:   Mobility Assessment:     Gait and Functional Mobility:  Transfers:   Bed mobility: modified independent with increased UE support and time due to pain. Sit to/from Supine: independent with increased time due to pain. Sit to/from stands: required UE support leans towards(L) side and keep knee extended. Gait: antalgic gait pattern, with (R) knee in extension. Assistive device:  None (but recommending a cane)   Gait speed: NT  TUG test: 29sec without device and antalgic gait pattern. Timed Up and GO (TUG) Test    Description: Measure of function with correlates to balance and fall risk        Interpretation:  ? 10 seconds = normal    ? 20seconds = good mobility,can go out alone, mobile without gait aid    ? 30 seconds = problems, cannot go outside alone, requires gait aid     A score of ? 14 seconds has been shown to indicate high risk of falls. Age  Matched  Norms: Age in years Mean  in  seconds    60-69       7.9 +/-0.9    70-79 7.7 +/-2.3    80-89   No  device:  11.0 +/-2.2    With  device:  19.9 +/-6.4     No  device:  14.7 +/- 7.9    With  Device: 19.9 +/-2.5         1. Allan PETERSEN. The Time Up &Go: A Test of Basic Functional Mobility for Frail Elderly Persons. Journal [address]  6685;95(7): [id number].    [address] [facility] [address],Wilber LEAL. Predicting the Probability for Falls in Community Dwelling Older Adults Using the Timed Up & Go Test.  Physical Therapy 2000; 80(9): [id number].    3. Jocy MM,Renard GL, Sorin Allen. Functional Performance in Community Living Older Adults. Journal of Geriatric Physical Therapy 2003;  26(3): 14--?22.  4. [address], Falls Screening and Referral Algorithm, TUG, [facility] Falls Prevention Harvey,June 2005        Stairs: Negotiated 4(6'') steps with 1 rail and educated on STS pattern ascending/descending. 4 Stage Balance test       1. Stand with your feet [id number]. TIME:  30 seconds  2. Place the instep of one foot so it is touching the big toe of the other foot. TIME:  30 seconds with R leg forwards/ 11 sec with Left leg forward and increased way. 3. Tandem stand: Place one foot in front of the other, heel touching toe. TIME:  2 seconds with R leg forwards and 5 sec with left leg forwards  4. Stand on one foot. TIME:  6 seconds on R leg and 2 sec on left leg     Directions: There are four standing positions that get progressively harder to maintain. You should describe and demonstrate each position to the patient. Then,stand next to the patient, hold their arm, and help them assume the correct position. When the patient is steady, let go, and time how long they can maintain the position, but remain ready to assist the patient if they should lose their balance. If the patient can hold a position for 10 seconds without moving their feet or needing support, go on to the next position. If not, STOP the test. Patients should not use an assistive device (cane or walker) and they should keep their eyes open. An older adult who cannot hold the tandem stand for at least 10 seconds is at increased risk of falling. SLS   R leg 6 sec   L leg - 2 sec    Five times sit to stand:    Performed 5 reps in 30 sec - with increased WB on (L) knee, patient also avoids bending the R knee and reports 7/10 pain. Normative averages:  Clients younger than 61years old  £  10 seconds = Normal   Clients older than 61years old £ 14.2 seconds = Normal   Change of ³ 2.3 seconds shows a significant clinical improvement    Carlos RW. Reference values for the five repetition sit to stand test: a descriptive metaanalysis of data from elders. Percept Mot Skills 2006; 103(1):215-222.        Neurological: Reflexes / Sensations: reports numbness on R thigh and later joint line about 25% comparetd to 100% on left knee. Special Tests:   Knee  Special Tests:  Knee Varus/Valgus Stress: (+) varus    Ampac Score:  54.90%  ASSESSMENT/Changes in Function:   Patient is a 41 y/o male with dx of chronic R knee pain, presents to skilled PT with decreased R knee ROM, mild swelling, decreased BLE strength, decreased balance, slight M/L instablity and gait impairments. Patient also has a slight increased R knee varus and symptoms of R knee OA, patient will benefit from skilled PT services to address above. Problem List/Impairments: pain affecting function, decrease ROM, decrease strength, edema affecting function, impaired gait/ balance, decrease ADL/ functional abilitiies, decrease activity tolerance, decrease flexibility/ joint mobility and decrease transfer abilities  Treatment Plan may include any combination of the following: Therapeutic exercise, Neuromuscular re-education, Physical agent/modality, Gait/balance training, Manual therapy, Patient education, Functional mobility training and Stair training  Patient/ Caregiver education and instruction: exercises  Frequency / Duration: Patient to be seen 2 times per week for 12-16  treatments. Certification Period: 10/11/21-1/11/22  Patient Goal (s): To learn what I can do at home to ease the pain and make it better.     [] Met [] Not met [] Partially met   Short Term Goals: To be accomplished in 6-8   treatments. 1. Patient will be independent with his HEP to progress with POC. [] Met [] Not met [] Partially met   2. Patient pain level will decreased to <=3/10 with ambulation and ADL's. [] Met [] Not met [] Partially met   3. Patient will improve R knee ROM by 5 deg to equal his left knee. [] Met [] Not met [] Partially met   4. Patient TUG score will decrease by 5 sec to reduce his fall risk. [] Met [] Not met [] Partially met     Long Term Goals:  To be accomplished in 12-16  treatments. 1. Patient will ambulate with less antalgic gait pattern and increased R LE WB.[] Met [] Not met [] Partially met     2. Patient will perform sit<->Stand transfers with more ease and less knee pain and equal WB. [] Met [] Not met [] Partially met     3. Patient will be able to perform his job duties with less knee pain and discomfort. [] Met [] Not met [] Partially met     4. Patient will be able to negotiate >8 steps to get to his apartment with or without cane and less pain and knee discomfort. [] Met [] Not met [] Partially met         TODAY'S TREATMENT: EVALUATION completed   Visit #: 1  In time:11:15am  Out time:12:28  Total Treatment Time (min): 60  Total Timed Codes (min): 0 1:1 Treatment Time (MC only): 0   Pain Level (0-10 scale) pre treatment: 7/10  Pain Level (0-10 scale) post treatment: 5/10  Modality rationale: decrease inflammation, decrease pain, increase tissue extensibility and increase muscle contraction/control to improve the patients ability to ambulate with less knee pain.     Min Type Additional Details    [] Estim: []UnAtt   []Att       []TENS instruct                  []IFC  []Premod   []NMES []w/US   []w/ice   []w/heat  Position:                                     Location:   10 []  Ice     [x]  Heat  []  Ice massage Position:supine with wedge  Location: R knee    []  Traction: [] Cervical       []Lumbar                       []Intermittent   []Continuous                     Lbs:  []W/heat               []W/heat and Estim    []  Ultrasound: []Continuous   [] Pulsed at:                           []1MHz   []3MHz Location:  W/cm2:    [] Skin assessment post-treatment:  []intact        []redness- no adverse reaction     []redness - adverse reaction:      5 min Gait Training:  __100_ feet with cane___ device on level surfaces with ___ level of assist   Rationale: increase ROM, increase strength, improve coordination, improve balance and increase proprioception  to improve the patients ability to  antalgic gait pattern. With   [] TE   [] TA   [] Neuro   [] SC   [] other: Patient Education: [] Review HEP    [] Progressed/Changed HEP based on:   [] positioning   [] body mechanics   [] transfers   [x] heat/ice application    [] other: used of cane and R knee brace. [x]  Plan of care has been reviewed with PTA. The Plan of Care is based on information from the initial evaluation. Leela Saldaña, PT, DPT 10/11/2021   ________________________________________________________________________    I certify that the above Therapy Services are being furnished while the patient is under my care. I agree with the treatment plan and certify that this therapy is necessary.     Physician's Signature:_________________________________________________  Date:____________Time: ____________     Ann Saez MD PAST SURGICAL HISTORY:  No significant past surgical history

## 2023-03-10 RX ORDER — DEXTROMETHORPHAN HYDROBROMIDE, GUAIFENESIN 5; 100 MG/5ML; MG/5ML
LIQUID ORAL
Qty: 180 TABLET | Refills: 0 | Status: SHIPPED | OUTPATIENT
Start: 2023-03-10

## 2023-03-13 ENCOUNTER — PATIENT OUTREACH (OUTPATIENT)
Dept: CASE MANAGEMENT | Age: 48
End: 2023-03-13

## 2023-03-13 NOTE — PROGRESS NOTES
Ambulatory Care Management Note        Date/Time:  3/13/2023 3:40 PM    This patient was received as a referral from  [facility] for case management services. Multiple unsuccessful attempts have been made to contact patient. Ambulatory Care Management get in touch letter mailed to the patients address on file w/no response to date (not able to send GIT letter via TapS[employer] message as pt doesn't currently have active TapSense status).      No further outreach attempts are scheduled by [facility] at this time.   Barb Eid

## 2023-04-11 PROBLEM — G47.00 INSOMNIA, UNSPECIFIED TYPE: Status: ACTIVE | Noted: 2023-04-11

## 2023-04-11 PROBLEM — E66.01 MORBID OBESITY WITH BMI OF 50.0-59.9, ADULT (HCC): Status: ACTIVE | Noted: 2022-01-16

## 2023-04-17 ENCOUNTER — APPOINTMENT (OUTPATIENT)
Dept: GENERAL RADIOLOGY | Age: 48
End: 2023-04-17
Attending: EMERGENCY MEDICINE
Payer: MEDICARE

## 2023-04-17 ENCOUNTER — TELEPHONE (OUTPATIENT)
Dept: INTERNAL MEDICINE CLINIC | Age: 48
End: 2023-04-17

## 2023-04-17 ENCOUNTER — APPOINTMENT (OUTPATIENT)
Dept: NON INVASIVE DIAGNOSTICS | Age: 48
End: 2023-04-17
Attending: EMERGENCY MEDICINE
Payer: MEDICARE

## 2023-04-17 ENCOUNTER — HOSPITAL ENCOUNTER (EMERGENCY)
Age: 48
Discharge: HOME | End: 2023-04-17
Attending: EMERGENCY MEDICINE
Payer: MEDICARE

## 2023-04-17 VITALS
SYSTOLIC BLOOD PRESSURE: 141 MMHG | RESPIRATION RATE: 18 BRPM | TEMPERATURE: 97.5 F | DIASTOLIC BLOOD PRESSURE: 88 MMHG | WEIGHT: 315 LBS | HEART RATE: 91 BPM | OXYGEN SATURATION: 97 % | BODY MASS INDEX: 50.62 KG/M2 | HEIGHT: 66 IN

## 2023-04-17 DIAGNOSIS — M79.605 BILATERAL LEG PAIN: Primary | ICD-10-CM

## 2023-04-17 DIAGNOSIS — M19.90 OSTEOARTHRITIS, UNSPECIFIED OSTEOARTHRITIS TYPE, UNSPECIFIED SITE: ICD-10-CM

## 2023-04-17 DIAGNOSIS — M54.30 SCIATICA, UNSPECIFIED LATERALITY: ICD-10-CM

## 2023-04-17 DIAGNOSIS — M25.561 ACUTE PAIN OF RIGHT KNEE: ICD-10-CM

## 2023-04-17 DIAGNOSIS — M79.604 BILATERAL LEG PAIN: Primary | ICD-10-CM

## 2023-04-17 LAB
ANION GAP SERPL CALC-SCNC: 2 MMOL/L (ref 5–15)
BASOPHILS # BLD: 0 K/UL (ref 0–0.1)
BASOPHILS NFR BLD: 0 % (ref 0–1)
BUN SERPL-MCNC: 13 MG/DL (ref 6–20)
BUN/CREAT SERPL: 13 (ref 12–20)
CA-I BLD-MCNC: 8.8 MG/DL (ref 8.5–10.1)
CHLORIDE SERPL-SCNC: 106 MMOL/L (ref 97–108)
CO2 SERPL-SCNC: 28 MMOL/L (ref 21–32)
CREAT SERPL-MCNC: 0.97 MG/DL (ref 0.7–1.3)
DIFFERENTIAL METHOD BLD: NORMAL
EOSINOPHIL # BLD: 0.2 K/UL (ref 0–0.4)
EOSINOPHIL NFR BLD: 2 % (ref 0–7)
ERYTHROCYTE [DISTWIDTH] IN BLOOD BY AUTOMATED COUNT: 12.8 % (ref 11.5–14.5)
GLUCOSE SERPL-MCNC: 103 MG/DL (ref 65–100)
HCT VFR BLD AUTO: 44.8 % (ref 36.6–50.3)
HGB BLD-MCNC: 16.3 G/DL (ref 12.1–17)
IMM GRANULOCYTES # BLD AUTO: 0 K/UL (ref 0–0.04)
IMM GRANULOCYTES NFR BLD AUTO: 0 % (ref 0–0.5)
LYMPHOCYTES # BLD: 2.1 K/UL (ref 0.8–3.5)
LYMPHOCYTES NFR BLD: 23 % (ref 12–49)
MCH RBC QN AUTO: 30.1 PG (ref 26–34)
MCHC RBC AUTO-ENTMCNC: 36.4 G/DL (ref 30–36.5)
MCV RBC AUTO: 82.8 FL (ref 80–99)
MONOCYTES # BLD: 1 K/UL (ref 0–1)
MONOCYTES NFR BLD: 11 % (ref 5–13)
NEUTS SEG # BLD: 5.6 K/UL (ref 1.8–8)
NEUTS SEG NFR BLD: 64 % (ref 32–75)
NRBC # BLD: 0 K/UL (ref 0–0.01)
NRBC BLD-RTO: 0 PER 100 WBC
PLATELET # BLD AUTO: 331 K/UL (ref 150–400)
PMV BLD AUTO: 9.2 FL (ref 8.9–12.9)
POTASSIUM SERPL-SCNC: 4 MMOL/L (ref 3.5–5.1)
RBC # BLD AUTO: 5.41 M/UL (ref 4.1–5.7)
SODIUM SERPL-SCNC: 136 MMOL/L (ref 136–145)
WBC # BLD AUTO: 9 K/UL (ref 4.1–11.1)

## 2023-04-17 PROCEDURE — 72100 X-RAY EXAM L-S SPINE 2/3 VWS: CPT

## 2023-04-17 PROCEDURE — 93970 EXTREMITY STUDY: CPT

## 2023-04-17 PROCEDURE — 93922 UPR/L XTREMITY ART 2 LEVELS: CPT

## 2023-04-17 PROCEDURE — 85025 COMPLETE CBC W/AUTO DIFF WBC: CPT

## 2023-04-17 PROCEDURE — 99284 EMERGENCY DEPT VISIT MOD MDM: CPT

## 2023-04-17 PROCEDURE — 74011250636 HC RX REV CODE- 250/636: Performed by: EMERGENCY MEDICINE

## 2023-04-17 PROCEDURE — 36415 COLL VENOUS BLD VENIPUNCTURE: CPT

## 2023-04-17 PROCEDURE — 80048 BASIC METABOLIC PNL TOTAL CA: CPT

## 2023-04-17 PROCEDURE — 73562 X-RAY EXAM OF KNEE 3: CPT

## 2023-04-17 PROCEDURE — 96374 THER/PROPH/DIAG INJ IV PUSH: CPT

## 2023-04-17 RX ORDER — KETOROLAC TROMETHAMINE 15 MG/ML
15 INJECTION, SOLUTION INTRAMUSCULAR; INTRAVENOUS
Status: COMPLETED | OUTPATIENT
Start: 2023-04-17 | End: 2023-04-17

## 2023-04-17 RX ORDER — DICLOFENAC POTASSIUM 50 MG/1
50 TABLET, FILM COATED ORAL 3 TIMES DAILY
Qty: 15 TABLET | Refills: 0 | Status: SHIPPED | OUTPATIENT
Start: 2023-04-17 | End: 2023-04-19

## 2023-04-17 RX ORDER — METHOCARBAMOL 750 MG/1
750 TABLET, FILM COATED ORAL
Qty: 15 TABLET | Refills: 0 | Status: SHIPPED | OUTPATIENT
Start: 2023-04-17

## 2023-04-17 RX ADMIN — KETOROLAC TROMETHAMINE 15 MG: 15 INJECTION, SOLUTION INTRAMUSCULAR; INTRAVENOUS at 16:44

## 2023-04-17 NOTE — ED PROVIDER NOTES
[facility] EMERGENCY DEPT  EMERGENCY DEPARTMENT HISTORY AND PHYSICAL EXAM      Date: 4/17/2023  Patient Name: Katlyn Arredondo  MRN: [id number]  YOB: 1975  Date of evaluation: 4/17/2023  Provider: Av Simms MD   Note Started: 3:09 PM 4/17/23    HISTORY OF PRESENT ILLNESS     Chief Complaint   Patient presents with    Leg Pain       History Provided By: Patient    HPI: Katlyn Arredondo is a 56 y.o. male presents with concern for blood clot in his legs as well as \"cold sensation\" in his legs the right greater than left. Patient also reports low back pain that radiates down his left leg. Patient denies trauma. Patient denies saddle anesthesia, denies bowel or bladder retention or incontinence, denies fevers or chills. Patient reports pain is worse with standing which she does at work, is improved somewhat with rest.  Patient also reports right knee pain. PAST MEDICAL HISTORY   Past Medical History:  Past Medical History:   Diagnosis Date    Hand cramps 10/10/2020    Hypercholesterolemia     Hypertension     Impotence 10/10/2020    Increased frequency of urination 10/10/2020    Leukocytosis 10/10/2020    Migraines     Mild intermittent asthma without complication 33/45/3371    Morbid obesity with BMI of 50.0-59.9, adult ([facility] [address]     Multiple acquired skin tags 10/10/2020    Pain in both hands 10/10/2020    Paresthesia of left upper limb 10/10/2020    Seasonal allergic rhinitis 10/10/2020    Vitamin D deficiency 10/10/2020       Past Surgical History:  Past Surgical History:   Procedure Laterality Date    HX APPENDECTOMY  1993    HX PROSTATE SURGERY      HX UROLOGICAL         Family History:  Family History   Adopted: Yes   Family history unknown: Yes       Social History:  Social History     Tobacco Use    Smoking status: Never    Smokeless tobacco: Never   Substance Use Topics    Alcohol use: No    Drug use: Never       Allergies:   Allergies   Allergen Reactions    Keflex [Cephalexin] Anaphylaxis and Shortness of Breath       PCP: Norbert Sharpe MD    Current Meds:   Previous Medications    ACETAMINOPHEN (TYLENOL) 650 MG TBER    TAKE 1 TABLET TWICE DAILY AS NEEDED FOR PAIN    ERGOCALCIFEROL (ERGOCALCIFEROL) 1,250 MCG (50,000 UNIT) CAPSULE    Take 1 Capsule by mouth every seven (7) days for 90 days. METOPROLOL SUCCINATE (TOPROL-XL) 25 MG XL TABLET    TAKE 1 TABLET EVERY DAY    PRAVASTATIN (PRAVACHOL) 20 MG TABLET    Take 1 Tablet by mouth nightly. PHYSICAL EXAM     ED Triage Vitals [04/17/23 1448]   ED Encounter Vitals Group      BP (!) 141/88      Pulse (Heart Rate) 91      Resp Rate 18      Temp 97.5 °F (36.4 °C)      Temp src       O2 Sat (%) 97 %      Weight 335 lb      Height 5' 6\"      Physical Exam  Physical Exam  Constitutional:       General: No acute distress. Appearance: Normal appearance. Not toxic-appearing. HENT:      Head: Normocephalic and atraumatic. Nose: Nose normal.      Mouth/Throat:      Mouth: Mucous membranes are moist.   Eyes:      Extraocular Movements: Extraocular movements intact. Pupils: Pupils are equal, round, and reactive to light. Cardiovascular:      Rate and Rhythm: Normal rate. Pulses: Normal pulses. Pulmonary:      Effort: Pulmonary effort is normal.      Breath sounds: No stridor. Abdominal:      General: Abdomen is flat. There is no distension. Musculoskeletal:         General: Normal range of motion. Cervical back: Normal range of motion and neck supple. Skin:     General: Skin is warm and dry. Capillary Refill: Capillary refill takes less than 2 seconds. Neurological:      General: No focal deficit present. Mental Status: Aert and oriented to person, place, and time.    Psychiatric:         Mood and Affect: Mood normal.         Behavior: Behavior normal.       SCREENINGS              LAB, EKG AND DIAGNOSTIC RESULTS   Labs:  Recent Results (from the past 12 hour(s))   METABOLIC PANEL, BASIC Collection Time: 04/17/23  3:40 PM   Result Value Ref Range    Sodium 136 136 - 145 mmol/L    Potassium 4.0 3.5 - 5.1 mmol/L    Chloride 106 97 - 108 mmol/L    CO2 28 21 - 32 mmol/L    Anion gap 2 (L) 5 - 15 mmol/L    Glucose 103 (H) 65 - 100 mg/dL    BUN 13 6 - 20 mg/dL    Creatinine 0.97 0.70 - 1.30 mg/dL    BUN/Creatinine ratio 13 12 - 20      eGFR >60 >60 ml/min/1.73m2    Calcium 8.8 8.5 - 10.1 mg/dL   CBC WITH AUTOMATED DIFF    Collection Time: 04/17/23  3:40 PM   Result Value Ref Range    WBC 9.0 4.1 - 11.1 K/uL    RBC 5.41 4.10 - 5.70 M/uL    HGB 16.3 12.1 - 17.0 g/dL    HCT 44.8 36.6 - 50.3 %    MCV 82.8 80.0 - 99.0 FL    MCH 30.1 26.0 - 34.0 PG    MCHC 36.4 30.0 - 36.5 g/dL    RDW 12.8 11.5 - 14.5 %    PLATELET 755 971 - 890 K/uL    MPV 9.2 8.9 - 12.9 FL    NRBC 0.0 0.0  WBC    ABSOLUTE NRBC 0.00 0.00 - 0.01 K/uL    NEUTROPHILS 64 32 - 75 %    LYMPHOCYTES 23 12 - 49 %    MONOCYTES 11 5 - 13 %    EOSINOPHILS 2 0 - 7 %    BASOPHILS 0 0 - 1 %    IMMATURE GRANULOCYTES 0 0 - 0.5 %    ABS. NEUTROPHILS 5.6 1.8 - 8.0 K/UL    ABS. LYMPHOCYTES 2.1 0.8 - 3.5 K/UL    ABS. MONOCYTES 1.0 0.0 - 1.0 K/UL    ABS. EOSINOPHILS 0.2 0.0 - 0.4 K/UL    ABS. BASOPHILS 0.0 0.0 - 0.1 K/UL    ABS. IMM. GRANS. 0.0 0.00 - 0.04 K/UL    DF AUTOMATED     DUPLEX LOW EXT ARTERIES WITH SAMUEL    Collection Time: 04/17/23  4:35 PM   Result Value Ref Range    Left Dist CAITLIN Velocity 63.0 cm/s    Left Dist PTA PSV 94.8 cm/s    Right Dist CAITLIN Velocity 66.6 cm/s    Right Dist PTA PSV 79.8 cm/s    Left arm  mmHg    Right arm  mmHg    Left posterior tibial 171 mmHg    Right posterior tibial 150 mmHg    Left SAMUEL 1.10     Right SAMUEL 0.96        EKG: Initial EKG interpreted by me.  Not Applicable    Radiologic Studies:  Non-plain film images such as CT, Ultrasound and MRI are read by the radiologist. Plain radiographic images are visualized and preliminarily interpreted by the ED Physician with the following findings: Not Applicable    Interpretation per the Radiologist below, if available at the time of this note:  XR SPINE LUMB 2 OR 3 V    Result Date: 4/17/2023  INDICATION:  LBP with left sided sciatica Exam: AP, lateral views of the lumbar spine. FINDINGS: There is no acute fracture or subluxation. Intervertebral disc spaces are well maintained. Bones are well-mineralized. Soft tissues are normal.     No acute fracture or subluxation. XR KNEE RT 3 V    Result Date: 4/17/2023  INDICATION:  R knee pain, denies trauma Exam: AP, lateral, oblique views of the right knee. FINDINGS: There is no acute fracture-dislocation. There is moderate to severe narrowing of the medial tibiofemoral joint space. There are tricompartmental osteophytes. No suprapatellar joint fluid is visualized. No acute fracture or dislocation. PROCEDURES   Unless otherwise noted below, none. Procedures      CRITICAL CARE TIME   Patient does not meet Critical Care Time, 0 minutes    ED COURSE and DIFFERENTIAL DIAGNOSIS/MDM   CC/HPI/PE Summary, DDx: Patient presents with pain consistent for sciatica but also worsened over the problem such as arthritis. Blood clot possible. Will evaluate with ABIs as well as venous duplex to evaluate for peripheral vascular disease as well as DVT.     Records Reviewed (source and summary of external notes): Prior medical records and Nursing notes    Vitals:    Vitals:    04/17/23 1448   BP: (!) 141/88   Pulse: 91   Resp: 18   Temp: 97.5 °F (36.4 °C)   SpO2: 97%   Weight: 152 kg (335 lb)   Height: 5' 6\" (1.676 m)        ED COURSE       Disposition Considerations (Tests not done, Shared Decision Making, Pt Expectation of Test or Treatment.): Not Applicable    Patient was given the following medications:  Medications   ketorolac (TORADOL) injection 15 mg (15 mg IntraVENous Given 4/17/23 8535)       CONSULTS: (Who and What was discussed)  None     Social Determinants affecting Dx or Tx: None    Smoking Cessation: Not Applicable    FINAL IMPRESSION     1. Bilateral leg pain    2. Acute pain of right knee    3. Osteoarthritis, unspecified osteoarthritis type, unspecified site    4. Sciatica, unspecified laterality          DISPOSITION/PLAN   Discharged    Discharge Note: The patient is stable for discharge home. The signs, symptoms, diagnosis, and discharge instructions have been discussed, understanding conveyed, and agreed upon. The patient is to follow up as recommended or return to ER should their symptoms worsen. PATIENT REFERRED TO:  Follow-up Information       Follow up With Specialties Details Why Contact Info    Jake Dubois MD Orthopedic Surgery  As needed [facility] Falk  [phone]                DISCHARGE MEDICATIONS:  Current Discharge Medication List        START taking these medications    Details   diclofenac potassium (CATAFLAM) 50 mg tablet Take 1 Tablet by mouth three (3) times daily. Qty: 15 Tablet, Refills: 0  Start date: 4/17/2023      methocarbamoL (Robaxin-750) 750 mg tablet Take 1 Tablet by mouth three (3) times daily as needed for Muscle Spasm(s). Qty: 15 Tablet, Refills: 0  Start date: 4/17/2023               DISCONTINUED MEDICATIONS:  Current Discharge Medication List          I am the Primary Clinician of Record: Denisse Juárez MD (electronically signed)    (Please note that parts of this dictation were completed with voice recognition software. Quite often unanticipated grammatical, syntax, homophones, and other interpretive errors are inadvertently transcribed by the computer software. Please disregards these errors.  Please excuse any errors that have escaped final proofreading.)

## 2023-04-17 NOTE — TELEPHONE ENCOUNTER
Pt called having to cancel his appt with Dr. Mary Gu on 04/19/2023 1030am. He is short handed at work and too busy for a morning time. Can we please give him an afternoon appt? Pt complaining that the numbness and tingling feeling he has had in his right leg, has also started affecting his left leg. By the end of the day his feet and legs are so bad sometimes his wife has to help him out of the car. We offered him an appointment for tomorrow-04/18/2023, 800am, but he is unable to take that appointment time. I told him we will try to see if an afternoon pt is willing to come in the morning, and we can then give him an afternoon spot. We will call him back. Pt was very appreciative. I also told him he can always go to ER or Urgent care if we are unable to get him in for an appointment. Pt verbalized understanding, but really did not want to have to do that. Thank you.

## 2023-04-17 NOTE — ED TRIAGE NOTES
Pt arrives to ED with wife. Pt reports he thinks he has clot in R leg due to pain and \"cold sensation. \" Pt also reports lower back pain that radiates down L leg.

## 2023-04-17 NOTE — DISCHARGE INSTRUCTIONS
Thank you! Thank you for allowing me to care for you in the emergency department. It is my goal to provide you with excellent care. If you have not received excellent quality care, please ask to speak to the nurse manager. Please fill out the survey that will come to you by mail or email since we listen to your feedback! Below you will find a list of your tests from today's visit. Should you have any questions, please do not hesitate to call the emergency department. Labs  Recent Results (from the past 12 hour(s))   METABOLIC PANEL, BASIC    Collection Time: 04/17/23  3:40 PM   Result Value Ref Range    Sodium 136 136 - 145 mmol/L    Potassium 4.0 3.5 - 5.1 mmol/L    Chloride 106 97 - 108 mmol/L    CO2 28 21 - 32 mmol/L    Anion gap 2 (L) 5 - 15 mmol/L    Glucose 103 (H) 65 - 100 mg/dL    BUN 13 6 - 20 mg/dL    Creatinine 0.97 0.70 - 1.30 mg/dL    BUN/Creatinine ratio 13 12 - 20      eGFR >60 >60 ml/min/1.73m2    Calcium 8.8 8.5 - 10.1 mg/dL   CBC WITH AUTOMATED DIFF    Collection Time: 04/17/23  3:40 PM   Result Value Ref Range    WBC 9.0 4.1 - 11.1 K/uL    RBC 5.41 4.10 - 5.70 M/uL    HGB 16.3 12.1 - 17.0 g/dL    HCT 44.8 36.6 - 50.3 %    MCV 82.8 80.0 - 99.0 FL    MCH 30.1 26.0 - 34.0 PG    MCHC 36.4 30.0 - 36.5 g/dL    RDW 12.8 11.5 - 14.5 %    PLATELET 380 418 - 204 K/uL    MPV 9.2 8.9 - 12.9 FL    NRBC 0.0 0.0  WBC    ABSOLUTE NRBC 0.00 0.00 - 0.01 K/uL    NEUTROPHILS 64 32 - 75 %    LYMPHOCYTES 23 12 - 49 %    MONOCYTES 11 5 - 13 %    EOSINOPHILS 2 0 - 7 %    BASOPHILS 0 0 - 1 %    IMMATURE GRANULOCYTES 0 0 - 0.5 %    ABS. NEUTROPHILS 5.6 1.8 - 8.0 K/UL    ABS. LYMPHOCYTES 2.1 0.8 - 3.5 K/UL    ABS. MONOCYTES 1.0 0.0 - 1.0 K/UL    ABS. EOSINOPHILS 0.2 0.0 - 0.4 K/UL    ABS. BASOPHILS 0.0 0.0 - 0.1 K/UL    ABS. IMM.  GRANS. 0.0 0.00 - 0.04 K/UL    DF AUTOMATED     DUPLEX LOW EXT ARTERIES WITH SAMUEL    Collection Time: 04/17/23  4:35 PM   Result Value Ref Range    Left Dist CAITLIN Velocity 63.0 cm/s Left Dist PTA PSV 94.8 cm/s    Right Dist CAITLIN Velocity 66.6 cm/s    Right Dist PTA PSV 79.8 cm/s    Left arm  mmHg    Right arm  mmHg    Left posterior tibial 171 mmHg    Right posterior tibial 150 mmHg    Left SAMUEL 1.10     Right SAMUEL 0.96        Radiologic Studies  DUPLEX LOW EXT ARTERIES WITH SAMUEL         XR KNEE RT 3 V   Final Result   No acute fracture or dislocation. XR SPINE LUMB 2 OR 3 V   Final Result   No acute fracture or subluxation. DUPLEX LOWER EXT VENOUS BILAT    (Results Pending)     CT Results  (Last 48 hours)      None          CXR Results  (Last 48 hours)      None          ------------------------------------------------------------------------------------------------------------  The exam and treatment you received in the Emergency Department were for an urgent problem and are not intended as complete care. It is important that you follow-up with a doctor, nurse practitioner, or physician assistant to:  (1) confirm your diagnosis,  (2) re-evaluation of changes in your illness and treatment, and  (3) for ongoing care. Please take your discharge instructions with you when you go to your follow-up appointment. If you have any problem arranging a follow-up appointment, contact the Emergency Department. If your symptoms become worse or you do not improve as expected and you are unable to reach your health care provider, please return to the Emergency Department. We are available 24 hours a day. If a prescription has been provided, please have it filled as soon as possible to prevent a delay in treatment. If you have any questions or reservations about taking the medication due to side effects or interactions with other medications, please call your primary care provider or contact the ER.

## 2023-04-18 LAB
LEFT ABI: 1.1
LEFT ARM BP: 153 MMHG
LEFT DIST ATA VELOCITY: 63 CM/S
LEFT DIST PTA PSV: 94.8 CM/S
LEFT POSTERIOR TIBIAL: 171 MMHG
RIGHT ABI: 0.96
RIGHT ARM BP: 156 MMHG
RIGHT DIST ATA VELOCITY: 66.6 CM/S
RIGHT DIST PTA PSV: 79.8 CM/S
RIGHT POSTERIOR TIBIAL: 150 MMHG

## 2023-04-19 ENCOUNTER — OFFICE VISIT (OUTPATIENT)
Dept: INTERNAL MEDICINE CLINIC | Age: 48
End: 2023-04-19
Payer: MEDICARE

## 2023-04-19 VITALS
RESPIRATION RATE: 18 BRPM | HEART RATE: 84 BPM | DIASTOLIC BLOOD PRESSURE: 84 MMHG | BODY MASS INDEX: 50.62 KG/M2 | SYSTOLIC BLOOD PRESSURE: 130 MMHG | OXYGEN SATURATION: 95 % | TEMPERATURE: 97.4 F | HEIGHT: 66 IN | WEIGHT: 315 LBS

## 2023-04-19 DIAGNOSIS — J45.20 MILD INTERMITTENT ASTHMA WITHOUT COMPLICATION: ICD-10-CM

## 2023-04-19 DIAGNOSIS — M17.11 OSTEOARTHRITIS OF RIGHT KNEE, UNSPECIFIED OSTEOARTHRITIS TYPE: ICD-10-CM

## 2023-04-19 DIAGNOSIS — G47.00 INSOMNIA, UNSPECIFIED TYPE: ICD-10-CM

## 2023-04-19 DIAGNOSIS — E66.01 MORBID OBESITY WITH BMI OF 50.0-59.9, ADULT (HCC): ICD-10-CM

## 2023-04-19 DIAGNOSIS — E78.00 PURE HYPERCHOLESTEROLEMIA: ICD-10-CM

## 2023-04-19 DIAGNOSIS — E55.9 VITAMIN D DEFICIENCY: ICD-10-CM

## 2023-04-19 DIAGNOSIS — E78.5 DYSLIPIDEMIA: ICD-10-CM

## 2023-04-19 DIAGNOSIS — I10 ESSENTIAL HYPERTENSION: Primary | ICD-10-CM

## 2023-04-19 PROCEDURE — G8427 DOCREV CUR MEDS BY ELIG CLIN: HCPCS | Performed by: INTERNAL MEDICINE

## 2023-04-19 PROCEDURE — G8510 SCR DEP NEG, NO PLAN REQD: HCPCS | Performed by: INTERNAL MEDICINE

## 2023-04-19 PROCEDURE — 99214 OFFICE O/P EST MOD 30 MIN: CPT | Performed by: INTERNAL MEDICINE

## 2023-04-19 PROCEDURE — G8417 CALC BMI ABV UP PARAM F/U: HCPCS | Performed by: INTERNAL MEDICINE

## 2023-04-19 RX ORDER — PRAVASTATIN SODIUM 20 MG/1
20 TABLET ORAL
Qty: 90 TABLET | Refills: 1 | Status: SHIPPED | OUTPATIENT
Start: 2023-04-19

## 2023-04-19 RX ORDER — OMEPRAZOLE 40 MG/1
40 CAPSULE, DELAYED RELEASE ORAL DAILY
Qty: 30 CAPSULE | Refills: 1 | Status: SHIPPED | OUTPATIENT
Start: 2023-04-19

## 2023-04-19 RX ORDER — SEMAGLUTIDE 0.25 MG/.5ML
0.25 INJECTION, SOLUTION SUBCUTANEOUS
Qty: 4 EACH | Refills: 3 | Status: SHIPPED | OUTPATIENT
Start: 2023-04-19

## 2023-04-19 RX ORDER — CELECOXIB 100 MG/1
100 CAPSULE ORAL 2 TIMES DAILY
Qty: 60 CAPSULE | Refills: 1 | Status: SHIPPED | OUTPATIENT
Start: 2023-04-19 | End: 2023-07-18

## 2023-04-19 RX ORDER — ERGOCALCIFEROL 1.25 MG/1
50000 CAPSULE ORAL
Qty: 12 CAPSULE | Refills: 0 | Status: SHIPPED | OUTPATIENT
Start: 2023-04-19 | End: 2023-07-18

## 2023-04-19 RX ORDER — METOPROLOL SUCCINATE 25 MG/1
25 TABLET, EXTENDED RELEASE ORAL DAILY
Qty: 90 TABLET | Refills: 1 | Status: SHIPPED | OUTPATIENT
Start: 2023-04-19

## 2023-04-19 NOTE — PROGRESS NOTES
Lashanda Kendall (: 1975) is a 56 y.o. male, established patient, here for evaluation of the following chief complaint(s):  Leg Pain, Knee Pain, and Arthritis         ASSESSMENT/PLAN:  Below is the assessment and plan developed based on review of pertinent history, physical exam, labs, studies, and medications. 1. Essential hypertension  2. Osteoarthritis of right knee, unspecified osteoarthritis type  -     REFERRAL TO ORTHOPEDICS  3. Morbid obesity with BMI of 50.0-59.9, adult ([facility] [address]  4. Dyslipidemia  5. Vitamin D deficiency  -     ergocalciferol (ERGOCALCIFEROL) 1,250 mcg (50,000 unit) capsule; Take 1 Capsule by mouth every seven (7) days for 90 days. , Normal, Disp-12 Capsule, R-0  6. Mild intermittent asthma without complication  7. Insomnia, unspecified type  8. Pure hypercholesterolemia  -     pravastatin (PRAVACHOL) 20 mg tablet; Take 1 Tablet by mouth nightly., Normal, Disp-90 Tablet, R-1    Hypertension, controlled after resting. Continued current medications. Asthma most likely due to BMI 53 he takes rescue inhaler only as needed no recent flareup    Severe right knee osteoarthritis pain is worse than left knee. He is not a good candidate to have knee replacement surgery however he has never consulted orthopedic surgeon so I referred and he recently visited ER and he was given methocarbamol so it is a muscle relaxant explained to be serious about calorie restricted diet and calorie disciplined diet and explained that I can start on Wegovy which is GLP-1 receptor agonist and it can restrict the hunger and high calories input in his body and he does not have personal or family history of medullary thyroid cancer or men 2 syndrome.   Also Tylenol does not help it is his weight that is worsening his knee pain started on celecoxib and side effects discussed on stomach kidney heart and blood pressure he will take sparingly and along with omeprazole, refills given and also refer him to orthopedic surgeon and also refer him to medical weight loss clinic at [facility]. Hypercholesteremia pravastatin 20 mg at bedtime. Counseling given that he should start his own determination and form efforts to lose weight to prevent flareup of knee pain and eventually BMI of 53 can affect other joints and including hip joint and spinal cord and joints below the waist.      Vitamin D deficiency vitamin D once a week. He has no depression. Return in about 3 months (around 7/19/2023) for follow up for chronic condition. 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 He visited emergency room for the right knee pain 2 days ago and he was prescribed methocarbamol. He had underwent x-ray of the knee joint showing moderate to severe osteoarthritis in the right knee joint he has not been seen by any orthopedic surgeon I have counseled him that with his BMI of 53 it will be hard to get clearance from orthopedic surgeon to have knee replacement surgery and he has to be serious to lose weight with his efforts with calorie restricted diet, portion control and we will try on weight loss medicine Wegovy and I hope it will be covered by insurance if not I will give 3-month course of phentermine he has no family or personal history of medullary thyroid cancer or men 2 syndrome. He does not have depression. He says he works but his leg gets tired especially right knee is worse than left knee. He does not take any Tylenol or NSAIDs. He has not seen orthopedic surgeon. He takes statin for hypercholesteremia he has vitamin D deficiency. Allergies   Allergen Reactions    Keflex [Cephalexin] Anaphylaxis and Shortness of Breath     Current Outpatient Medications   Medication Sig    semaglutide, weight loss, (Wegovy) 0.25 mg/0.5 mL pnij 0.25 mg by SubCUTAneous route every seven (7) days.  Take for  4weeks then increase the dose to 0.5 mg once a week for another 4 weeks then 1 mg once a week    ergocalciferol (ERGOCALCIFEROL) 1,250 mcg (50,000 unit) capsule Take 1 Capsule by mouth every seven (7) days for 90 days. metoprolol succinate (TOPROL-XL) 25 mg XL tablet Take 1 Tablet by mouth daily. pravastatin (PRAVACHOL) 20 mg tablet Take 1 Tablet by mouth nightly. celecoxib (CELEBREX) 100 mg capsule Take 1 Capsule by mouth two (2) times a day for 90 days. Take for 2 weeks with food then as needed    omeprazole (PRILOSEC) 40 mg capsule Take 1 Capsule by mouth daily. Take 30 minutes before eating once a day  Indications: stomach ulcer from aspirin/ibuprofen-like drugs prevention    methocarbamoL (Robaxin-750) 750 mg tablet Take 1 Tablet by mouth three (3) times daily as needed for Muscle Spasm(s). acetaminophen (TYLENOL) 650 mg TbER TAKE 1 TABLET TWICE DAILY AS NEEDED FOR PAIN     No current facility-administered medications for this visit. Past Medical History:   Diagnosis Date    Hand cramps 10/10/2020    Hypercholesterolemia     Hypertension     Impotence 10/10/2020    Increased frequency of urination 10/10/2020    Leukocytosis 10/10/2020    Migraines     Mild intermittent asthma without complication 04/71/5788    Morbid obesity with BMI of 50.0-59.9, adult ([facility] [address]     Multiple acquired skin tags 10/10/2020    Osteoarthritis of right knee, unspecified osteoarthritis type 4/19/2023    Pain in both hands 10/10/2020    Paresthesia of left upper limb 10/10/2020    Seasonal allergic rhinitis 10/10/2020    Vitamin D deficiency 10/10/2020     Past Surgical History:   Procedure Laterality Date    HX APPENDECTOMY  1993    HX PROSTATE SURGERY      HX UROLOGICAL       Family History   Adopted: Yes   Family history unknown: Yes     Social History     Tobacco Use   Smoking Status Never   Smokeless Tobacco Never       Review of Systems   Constitutional: Negative. HENT:  Negative for sinus pain and sore throat. Eyes:  Negative for blurred vision.    Respiratory: Negative for cough, shortness of breath and wheezing. Cardiovascular: Negative. Gastrointestinal:  Negative for abdominal pain, heartburn, nausea and vomiting. Genitourinary: Negative. Musculoskeletal: Negative. Knee pain rt more than left   Neurological:  Negative for dizziness, tingling, tremors and headaches. Vitals:    04/19/23 1208 04/19/23 1216 04/19/23 1254   BP: (!) 143/84 133/84 130/84   Pulse: 95  84   Resp: 18     Temp: 97.4 °F (36.3 °C)     TempSrc: Oral     SpO2: 95%     Weight: 331 lb 6.4 oz (150.3 kg)     Height: 5' 6\" (1.676 m)            Physical Exam  Vitals reviewed. Constitutional:       Appearance: Normal appearance. He is obese. He is not ill-appearing, toxic-appearing or diaphoretic. Eyes:      Pupils: Pupils are equal, round, and reactive to light. Cardiovascular:      Rate and Rhythm: Normal rate and regular rhythm. Pulses: Normal pulses. Heart sounds: Normal heart sounds. Pulmonary:      Effort: Pulmonary effort is normal. No respiratory distress. Breath sounds: Normal breath sounds. No rhonchi or rales. Abdominal:      General: Bowel sounds are normal. There is no distension. Palpations: Abdomen is soft. Tenderness: There is no abdominal tenderness. There is no guarding. Musculoskeletal:         General: No swelling or tenderness. Cervical back: Neck supple. Comments: Restricted range of movement at knee joint, BMI 53.49. Skin:     General: Skin is warm. Findings: No bruising or erythema. Neurological:      General: No focal deficit present. Mental Status: He is alert and oriented to person, place, and time. Mental status is at baseline. Cranial Nerves: No cranial nerve deficit. Motor: No weakness. Gait: Gait normal.   Psychiatric:         Mood and Affect: Mood normal.         Behavior: Behavior normal.     An electronic signature was used to authenticate this note.   -- Clayton Talamantes MD

## 2023-04-19 NOTE — PROGRESS NOTES
1. \"Have you been to the ER, urgent care clinic since your last visit? Hospitalized since your last visit? \" Yes When: 04/17/2023 Where: [facility]  Reason for visit: leg pain     2. \"Have you seen or consulted any other health care providers outside of the [address] [place] [address] since your last visit? \" No     3. For patients aged 39-70: Has the patient had a colonoscopy / FIT/ Cologuard? No      If the patient is female:    4. For patients aged 41-77: Has the patient had a mammogram within the past 2 years? NA - based on age or sex      11. For patients aged 21-65: Has the patient had a pap smear?  NA - based on age or sex    Chief Complaint   Patient presents with    Leg Pain    Knee Pain    Arthritis     Visit Vitals  BP (!) 143/84 (BP 1 Location: Left upper arm, BP Patient Position: Sitting, BP Cuff Size: Adult)   Pulse 95   Temp 97.4 °F (36.3 °C) (Oral)   Resp 18   Ht 5' 6\" (1.676 m)   Wt 331 lb 6.4 oz (150.3 kg)   SpO2 95%   BMI 53.49 kg/m²

## 2023-04-23 ENCOUNTER — HOSPITAL ENCOUNTER (EMERGENCY)
Age: 48
Discharge: HOME | End: 2023-04-24
Attending: EMERGENCY MEDICINE
Payer: MEDICARE

## 2023-04-23 DIAGNOSIS — J02.9 ALLERGIC PHARYNGITIS: Primary | ICD-10-CM

## 2023-04-23 PROCEDURE — 99283 EMERGENCY DEPT VISIT LOW MDM: CPT

## 2023-04-23 RX ORDER — FAMOTIDINE 20 MG/1
20 TABLET, FILM COATED ORAL
Status: COMPLETED | OUTPATIENT
Start: 2023-04-23 | End: 2023-04-24

## 2023-04-23 RX ORDER — DIPHENHYDRAMINE HCL 25 MG
50 CAPSULE ORAL
Status: COMPLETED | OUTPATIENT
Start: 2023-04-23 | End: 2023-04-24

## 2023-04-24 VITALS
HEIGHT: 66 IN | HEART RATE: 83 BPM | RESPIRATION RATE: 16 BRPM | SYSTOLIC BLOOD PRESSURE: 139 MMHG | BODY MASS INDEX: 50.62 KG/M2 | OXYGEN SATURATION: 98 % | TEMPERATURE: 98.9 F | DIASTOLIC BLOOD PRESSURE: 85 MMHG | WEIGHT: 315 LBS

## 2023-04-24 PROCEDURE — 74011250637 HC RX REV CODE- 250/637

## 2023-04-24 RX ADMIN — FAMOTIDINE 20 MG: 20 TABLET ORAL at 00:06

## 2023-04-24 RX ADMIN — DIPHENHYDRAMINE HYDROCHLORIDE 50 MG: 25 CAPSULE ORAL at 00:06

## 2023-04-24 NOTE — ED PROVIDER NOTES
[facility] EMERGENCY DEPT  EMERGENCY DEPARTMENT HISTORY AND PHYSICAL EXAM      Date: 4/23/2023  Patient Name: Joanie Herrera  MRN: [id number]  YOB: 1975  Date of evaluation: 4/23/2023  Provider: Jennifer Long NP   Note Started: 10:16 PM 4/23/23    HISTORY OF PRESENT ILLNESS     Chief Complaint   Patient presents with    Allergic Reaction       History Provided By: Patient    HPI: Joanie Herrera is a 56 y.o. male with a history of HTN, migraine, arthritis, and leukocytosis presents with allergic reaction. Patient states he was recently prescribed Celebrex and took his first dose tonight around 7 PM.  Around 7:30 PM he began to feel a \"scratchy\" feeling in his throat that has been intermittent without shortness of breath. He also endorses 1 brief episode of left lateral abdominal pain that self resolved. He denies fever, vomiting, diarrhea, rash, cyanosis, dizziness, or chest pain. No OTC treatment. Patient feels as though his brief abdominal pain may be related to his obesity. He states he is currently seeking intervention to help him lose weight and requests local resources for weight loss clinics as well. Tolerating oral intake and airway intact.     PAST MEDICAL HISTORY   Past Medical History:  Past Medical History:   Diagnosis Date    Hand cramps 10/10/2020    Hypercholesterolemia     Hypertension     Impotence 10/10/2020    Increased frequency of urination 10/10/2020    Leukocytosis 10/10/2020    Migraines     Mild intermittent asthma without complication 41/58/1168    Morbid obesity with BMI of 50.0-59.9, adult ([facility] [address]     Multiple acquired skin tags 10/10/2020    Osteoarthritis of right knee, unspecified osteoarthritis type 4/19/2023    Pain in both hands 10/10/2020    Paresthesia of left upper limb 10/10/2020    Seasonal allergic rhinitis 10/10/2020    Vitamin D deficiency 10/10/2020       Past Surgical History:  Past Surgical History:   Procedure Laterality Date    HX APPENDECTOMY 1993    HX PROSTATE SURGERY      HX UROLOGICAL         Family History:  Family History   Adopted: Yes   Family history unknown: Yes       Social History:  Social History     Tobacco Use    Smoking status: Never    Smokeless tobacco: Never   Vaping Use    Vaping Use: Never used   Substance Use Topics    Alcohol use: No    Drug use: Never       Allergies: Allergies   Allergen Reactions    Keflex [Cephalexin] Anaphylaxis and Shortness of Breath       PCP: Thomas Mendoza MD    Current Meds:   Previous Medications    ACETAMINOPHEN (TYLENOL) 650 MG TBER    TAKE 1 TABLET TWICE DAILY AS NEEDED FOR PAIN    CELECOXIB (CELEBREX) 100 MG CAPSULE    Take 1 Capsule by mouth two (2) times a day for 90 days. Take for 2 weeks with food then as needed    ERGOCALCIFEROL (ERGOCALCIFEROL) 1,250 MCG (50,000 UNIT) CAPSULE    Take 1 Capsule by mouth every seven (7) days for 90 days. METHOCARBAMOL (ROBAXIN-750) 750 MG TABLET    Take 1 Tablet by mouth three (3) times daily as needed for Muscle Spasm(s). METOPROLOL SUCCINATE (TOPROL-XL) 25 MG XL TABLET    Take 1 Tablet by mouth daily. OMEPRAZOLE (PRILOSEC) 40 MG CAPSULE    Take 1 Capsule by mouth daily. Take 30 minutes before eating once a day  Indications: stomach ulcer from aspirin/ibuprofen-like drugs prevention    PRAVASTATIN (PRAVACHOL) 20 MG TABLET    Take 1 Tablet by mouth nightly. SEMAGLUTIDE, WEIGHT LOSS, (WEGOVY) 0.25 MG/0.5 ML PNIJ    0.25 mg by SubCUTAneous route every seven (7) days. Take for  4weeks then increase the dose to 0.5 mg once a week for another 4 weeks then 1 mg once a week       PHYSICAL EXAM     ED Triage Vitals [04/23/23 2213]   ED Encounter Vitals Group      BP (!) 143/77      Pulse (Heart Rate) 94      Resp Rate 20      Temp 98.9 °F (37.2 °C)      Temp src       O2 Sat (%) 96 %      Weight 331 lb      Height 5' 6\"      Physical Exam  Vitals and nursing note reviewed. Constitutional:       General: He is not in acute distress.      Appearance: He is not diaphoretic. HENT:      Head: Normocephalic. Nose: Nose normal. No congestion or rhinorrhea. Mouth/Throat:      Mouth: Mucous membranes are moist. No angioedema. Tongue: Tongue does not deviate from midline. Pharynx: Oropharynx is clear. Uvula midline. No pharyngeal swelling, posterior oropharyngeal erythema or uvula swelling. Eyes:      Extraocular Movements: Extraocular movements intact. Conjunctiva/sclera: Conjunctivae normal.      Pupils: Pupils are equal, round, and reactive to light. Cardiovascular:      Rate and Rhythm: Normal rate and regular rhythm. Pulses: Normal pulses. Heart sounds: Normal heart sounds. No murmur heard. Pulmonary:      Effort: Pulmonary effort is normal. No tachypnea, accessory muscle usage or respiratory distress. Breath sounds: Normal breath sounds. No stridor or decreased air movement. No decreased breath sounds. Abdominal:      Palpations: Abdomen is soft. Tenderness: There is no abdominal tenderness. There is no guarding. Musculoskeletal:         General: Normal range of motion. Cervical back: Normal range of motion and neck supple. No tenderness. Skin:     General: Skin is warm and dry. Findings: No rash. Neurological:      General: No focal deficit present. Mental Status: He is alert and oriented to person, place, and time. Motor: No weakness. Psychiatric:         Mood and Affect: Mood normal.       SCREENINGS        No data recorded      LAB, EKG AND DIAGNOSTIC RESULTS   Labs:  No results found for this or any previous visit (from the past 24 hour(s)). EKG: Initial EKG interpreted by me.  Not Applicable    Radiologic Studies:  Non-plain film images such as CT, Ultrasound and MRI are read by the radiologist. Plain radiographic images are visualized and preliminarily interpreted by the ED Provider with the following findings: Not Applicable    Interpretation per the Radiologist below, if available at the time of this note:  No results found. PROCEDURES   Unless otherwise noted below, none  Performed by: Max Cormier NP   Procedures      CRITICAL CARE TIME   Patient does not meet Critical Care Time, 0 minutes    ED COURSE and DIFFERENTIAL DIAGNOSIS/MDM   CC/HPI Summary, DDx, ED Course, and Reassessment:     Records Reviewed (source and summary of external notes): Prior medical records and Nursing notes    Vitals:    Vitals:    04/23/23 2213 04/24/23 0009   BP: (!) 143/77 139/85   Pulse: 94 83   Resp: 20 16   Temp: 98.9 °F (37.2 °C)    SpO2: 96% 98%   Weight: 150.1 kg (331 lb)    Height: 5' 6\" (1.676 m)         ED Course as of 04/27/23 0941   Sun Apr 23, 2023   250 60-year-old male presents with throat irritation. He is alert, well-appearing, with appropriate triage vitals. No respiratory distress or increased work of breathing. Differentials include allergies, URI, strep pharyngitis, allergic reaction. [KW]   3967 Counseled regarding use of Celebrex and adverse reactions. He will follow-up with prescriber for further guidance. Advised to withhold if not tolerating. [KW]   0283 The patient has been re-evaluated and is ready for discharge. Results reviewed. Diagnosis and care plan counseling provided. Lauren Simon agrees to follow-up as recommended, or return to the ED if his symptoms worsen. Warning signs and return precautions discussed. He and wife are in agreement with plan of care, verbalized understanding, and questions answered. [KW]      ED Course User Index  [KW] Arpita Perez NP     Anaphylaxis - No respiratory distress, stridor, rash, vomiting, diarrhea, current abdominal pain, erythema, tachypnea, dysphonia, or hypoxia.     No throat swelling, stridor, nuchal rigidity, fever, rhinorrhea,  tripod position, neck bulging or unilateral tonsil swelling  - odynophagia    Peritonsillar Abscess - no drooling, trismus, unilateral severity, neck swelling, uvula deviation, ipsilateral ear pain. Epiglottitis - no drooling, tripodding or sniffing position, voice changes, dysphagia, respiratory distress, anxiety, \"hot potato\" voice, anterior neck pain at level of hyoid. Retropharyngeal Abscess - no voice changes, posterior pharynx bulging, dysphagia, restricted flexion / extention of neck. Ludwigs angina - no submental induration. Rash - no petechiae, purpura, scaling, blisters, peeling lesions. No lesions on palms or soles. No chancre. Disposition Considerations (Tests not done, Shared Decision Making, Pt Expectation of Test or Treatment.):     Patient was given the following medications:  Medications   diphenhydrAMINE (BENADRYL) capsule 50 mg (has no administration in time range)   famotidine (PEPCID) tablet 20 mg (has no administration in time range)       CONSULTS: (Who and What was discussed)  None     Social Determinants affecting Dx or Tx: None    Smoking Cessation: Not Applicable    FINAL IMPRESSION     1. Allergic pharyngitis          DISPOSITION/PLAN   Discharged    Discharge Note: The patient is stable for discharge home. The signs, symptoms, diagnosis, and discharge instructions have been discussed, understanding conveyed, and agreed upon. The patient is to follow up as recommended or return to ER should their symptoms worsen.       PATIENT REFERRED TO:  Follow-up Information       Follow up With Specialties Details Why [address] [facility] EMERGENCY DEPT Emergency Medicine  If symptoms worsen [address] [facility] [id number] [phone]    Julieta Garcia MD Internal Medicine Physician Call in 1 day  [address] [facility],[address], [facility]  [phone]                DISCHARGE MEDICATIONS:  Current Discharge Medication List            DISCONTINUED MEDICATIONS:  Current Discharge Medication List          I am the Primary Clinician of Record: Gisele Holland NP (electronically signed)    (Please note that parts of this dictation were completed with voice recognition software. Quite often unanticipated grammatical, syntax, homophones, and other interpretive errors are inadvertently transcribed by the computer software. Please disregards these errors.  Please excuse any errors that have escaped final proofreading.)

## 2023-04-30 ENCOUNTER — TELEPHONE (OUTPATIENT)
Dept: INTERNAL MEDICINE CLINIC | Age: 48
End: 2023-04-30

## 2023-05-15 RX ORDER — OMEPRAZOLE 40 MG/1
CAPSULE, DELAYED RELEASE ORAL
Qty: 30 CAPSULE | Refills: 4 | Status: SHIPPED | OUTPATIENT
Start: 2023-05-15

## 2023-05-20 RX ORDER — PRAVASTATIN SODIUM 20 MG
1 TABLET ORAL NIGHTLY
COMMUNITY
Start: 2023-04-19

## 2023-05-20 RX ORDER — CELECOXIB 100 MG/1
100 CAPSULE ORAL 2 TIMES DAILY
Qty: 60 CAPSULE | Refills: 2 | COMMUNITY
Start: 2023-04-19 | End: 2023-07-18

## 2023-05-20 RX ORDER — OMEPRAZOLE 40 MG/1
40 CAPSULE, DELAYED RELEASE ORAL DAILY
COMMUNITY
Start: 2023-04-19

## 2023-05-20 RX ORDER — METOPROLOL SUCCINATE 25 MG/1
25 TABLET, EXTENDED RELEASE ORAL DAILY
COMMUNITY
Start: 2023-04-19 | End: 2023-07-13 | Stop reason: SDUPTHER

## 2023-05-20 RX ORDER — ERGOCALCIFEROL 1.25 MG/1
50000 CAPSULE ORAL
Qty: 4 CAPSULE | Refills: 2 | COMMUNITY
Start: 2023-04-19 | End: 2023-07-18

## 2023-05-20 RX ORDER — SEMAGLUTIDE 0.25 MG/.5ML
0.25 INJECTION, SOLUTION SUBCUTANEOUS
COMMUNITY
Start: 2023-04-19

## 2023-05-20 RX ORDER — SENNOSIDES 8.6 MG
CAPSULE ORAL
COMMUNITY
Start: 2023-03-10

## 2023-05-20 RX ORDER — METHOCARBAMOL 750 MG/1
750 TABLET, FILM COATED ORAL 3 TIMES DAILY PRN
COMMUNITY
Start: 2023-04-17

## 2023-07-13 RX ORDER — METOPROLOL SUCCINATE 25 MG/1
25 TABLET, EXTENDED RELEASE ORAL DAILY
Qty: 90 TABLET | Refills: 0 | Status: SHIPPED | OUTPATIENT
Start: 2023-07-13

## 2023-10-26 ENCOUNTER — HOSPITAL ENCOUNTER (EMERGENCY)
Facility: HOSPITAL | Age: 48
Discharge: HOME | End: 2023-10-26
Attending: STUDENT IN AN ORGANIZED HEALTH CARE EDUCATION/TRAINING PROGRAM
Payer: MEDICARE

## 2023-10-26 ENCOUNTER — APPOINTMENT (OUTPATIENT)
Facility: HOSPITAL | Age: 48
End: 2023-10-26
Payer: MEDICARE

## 2023-10-26 VITALS
DIASTOLIC BLOOD PRESSURE: 76 MMHG | OXYGEN SATURATION: 97 % | HEART RATE: 83 BPM | WEIGHT: 267 LBS | BODY MASS INDEX: 42.91 KG/M2 | TEMPERATURE: 98 F | SYSTOLIC BLOOD PRESSURE: 130 MMHG | HEIGHT: 66 IN | RESPIRATION RATE: 18 BRPM

## 2023-10-26 DIAGNOSIS — R07.9 CHEST PAIN, UNSPECIFIED TYPE: Primary | ICD-10-CM

## 2023-10-26 DIAGNOSIS — R13.10 DYSPHAGIA, UNSPECIFIED TYPE: ICD-10-CM

## 2023-10-26 LAB
ALBUMIN SERPL-MCNC: 4 G/DL (ref 3.5–5)
ALBUMIN/GLOB SERPL: 1.3 (ref 1.1–2.2)
ALP SERPL-CCNC: 87 U/L (ref 45–117)
ALT SERPL-CCNC: 40 U/L (ref 12–78)
ANION GAP SERPL CALC-SCNC: 8 MMOL/L (ref 5–15)
APPEARANCE UR: CLEAR
AST SERPL W P-5'-P-CCNC: 26 U/L (ref 15–37)
BACTERIA URNS QL MICRO: NEGATIVE /HPF
BASOPHILS # BLD: 0.1 K/UL (ref 0–0.1)
BASOPHILS NFR BLD: 1 % (ref 0–1)
BILIRUB SERPL-MCNC: 0.4 MG/DL (ref 0.2–1)
BILIRUB UR QL: NEGATIVE
BUN SERPL-MCNC: 10 MG/DL (ref 6–20)
BUN/CREAT SERPL: 12 (ref 12–20)
CA-I BLD-MCNC: 9 MG/DL (ref 8.5–10.1)
CHLORIDE SERPL-SCNC: 105 MMOL/L (ref 97–108)
CO2 SERPL-SCNC: 27 MMOL/L (ref 21–32)
COLOR UR: ABNORMAL
CREAT SERPL-MCNC: 0.82 MG/DL (ref 0.7–1.3)
DIFFERENTIAL METHOD BLD: ABNORMAL
EOSINOPHIL # BLD: 0.1 K/UL (ref 0–0.4)
EOSINOPHIL NFR BLD: 2 % (ref 0–7)
EPITH CASTS URNS QL MICRO: ABNORMAL /LPF
ERYTHROCYTE [DISTWIDTH] IN BLOOD BY AUTOMATED COUNT: 13.1 % (ref 11.5–14.5)
GLOBULIN SER CALC-MCNC: 3.2 G/DL (ref 2–4)
GLUCOSE SERPL-MCNC: 109 MG/DL (ref 65–100)
GLUCOSE UR STRIP.AUTO-MCNC: NEGATIVE MG/DL
HCT VFR BLD AUTO: 43.1 % (ref 36.6–50.3)
HGB BLD-MCNC: 15.8 G/DL (ref 12.1–17)
HGB UR QL STRIP: NEGATIVE
IMM GRANULOCYTES # BLD AUTO: 0 K/UL (ref 0–0.04)
IMM GRANULOCYTES NFR BLD AUTO: 0 % (ref 0–0.5)
KETONES UR QL STRIP.AUTO: 5 MG/DL
LEUKOCYTE ESTERASE UR QL STRIP.AUTO: NEGATIVE
LIPASE SERPL-CCNC: 23 U/L (ref 13–75)
LYMPHOCYTES # BLD: 1.9 K/UL (ref 0.8–3.5)
LYMPHOCYTES NFR BLD: 22 % (ref 12–49)
MAGNESIUM SERPL-MCNC: 2.2 MG/DL (ref 1.6–2.4)
MCH RBC QN AUTO: 29.9 PG (ref 26–34)
MCHC RBC AUTO-ENTMCNC: 36.7 G/DL (ref 30–36.5)
MCV RBC AUTO: 81.6 FL (ref 80–99)
MONOCYTES # BLD: 0.8 K/UL (ref 0–1)
MONOCYTES NFR BLD: 10 % (ref 5–13)
MUCOUS THREADS URNS QL MICRO: ABNORMAL /LPF
NEUTS SEG # BLD: 5.7 K/UL (ref 1.8–8)
NEUTS SEG NFR BLD: 65 % (ref 32–75)
NITRITE UR QL STRIP.AUTO: NEGATIVE
NRBC # BLD: 0 K/UL (ref 0–0.01)
NRBC BLD-RTO: 0 PER 100 WBC
PH UR STRIP: 5 (ref 5–8)
PLATELET # BLD AUTO: 297 K/UL (ref 150–400)
PMV BLD AUTO: 8.8 FL (ref 8.9–12.9)
POTASSIUM SERPL-SCNC: 4.1 MMOL/L (ref 3.5–5.1)
PROT SERPL-MCNC: 7.2 G/DL (ref 6.4–8.2)
PROT UR STRIP-MCNC: 30 MG/DL
RBC # BLD AUTO: 5.28 M/UL (ref 4.1–5.7)
RBC #/AREA URNS HPF: ABNORMAL /HPF (ref 0–5)
SODIUM SERPL-SCNC: 140 MMOL/L (ref 136–145)
SP GR UR REFRACTOMETRY: 1.03 (ref 1–1.03)
TROPONIN I SERPL HS-MCNC: 13 NG/L (ref 0–76)
TROPONIN I SERPL HS-MCNC: 16 NG/L (ref 0–76)
URINE CULTURE IF INDICATED: ABNORMAL
UROBILINOGEN UR QL STRIP.AUTO: 2 EU/DL (ref 0.1–1)
WBC # BLD AUTO: 8.7 K/UL (ref 4.1–11.1)
WBC URNS QL MICRO: ABNORMAL /HPF (ref 0–4)

## 2023-10-26 PROCEDURE — 85025 COMPLETE CBC W/AUTO DIFF WBC: CPT

## 2023-10-26 PROCEDURE — 80053 COMPREHEN METABOLIC PANEL: CPT

## 2023-10-26 PROCEDURE — 96374 THER/PROPH/DIAG INJ IV PUSH: CPT

## 2023-10-26 PROCEDURE — 6360000002 HC RX W HCPCS: Performed by: STUDENT IN AN ORGANIZED HEALTH CARE EDUCATION/TRAINING PROGRAM

## 2023-10-26 PROCEDURE — 71045 X-RAY EXAM CHEST 1 VIEW: CPT

## 2023-10-26 PROCEDURE — 84484 ASSAY OF TROPONIN QUANT: CPT

## 2023-10-26 PROCEDURE — 83690 ASSAY OF LIPASE: CPT

## 2023-10-26 PROCEDURE — 99285 EMERGENCY DEPT VISIT HI MDM: CPT

## 2023-10-26 PROCEDURE — 93005 ELECTROCARDIOGRAM TRACING: CPT | Performed by: STUDENT IN AN ORGANIZED HEALTH CARE EDUCATION/TRAINING PROGRAM

## 2023-10-26 PROCEDURE — 6370000000 HC RX 637 (ALT 250 FOR IP): Performed by: STUDENT IN AN ORGANIZED HEALTH CARE EDUCATION/TRAINING PROGRAM

## 2023-10-26 PROCEDURE — 81001 URINALYSIS AUTO W/SCOPE: CPT

## 2023-10-26 PROCEDURE — 36415 COLL VENOUS BLD VENIPUNCTURE: CPT

## 2023-10-26 PROCEDURE — 83735 ASSAY OF MAGNESIUM: CPT

## 2023-10-26 RX ORDER — LIDOCAINE HYDROCHLORIDE 20 MG/ML
15 SOLUTION OROPHARYNGEAL
Status: COMPLETED | OUTPATIENT
Start: 2023-10-26 | End: 2023-10-26

## 2023-10-26 RX ORDER — MAGNESIUM HYDROXIDE/ALUMINUM HYDROXICE/SIMETHICONE 120; 1200; 1200 MG/30ML; MG/30ML; MG/30ML
30 SUSPENSION ORAL ONCE
Status: COMPLETED | OUTPATIENT
Start: 2023-10-26 | End: 2023-10-26

## 2023-10-26 RX ORDER — ONDANSETRON 2 MG/ML
4 INJECTION INTRAMUSCULAR; INTRAVENOUS ONCE
Status: COMPLETED | OUTPATIENT
Start: 2023-10-26 | End: 2023-10-26

## 2023-10-26 RX ADMIN — ALUMINUM HYDROXIDE, MAGNESIUM HYDROXIDE, AND SIMETHICONE 30 ML: 200; 200; 20 SUSPENSION ORAL at 13:39

## 2023-10-26 RX ADMIN — Medication 15 ML: at 13:39

## 2023-10-26 RX ADMIN — ONDANSETRON 4 MG: 2 INJECTION INTRAMUSCULAR; INTRAVENOUS at 11:01

## 2023-10-26 ASSESSMENT — PAIN SCALES - GENERAL
PAINLEVEL_OUTOF10: 10
PAINLEVEL_OUTOF10: 2
PAINLEVEL_OUTOF10: 2

## 2023-10-26 ASSESSMENT — LIFESTYLE VARIABLES
HOW OFTEN DO YOU HAVE A DRINK CONTAINING ALCOHOL: NEVER
HOW MANY STANDARD DRINKS CONTAINING ALCOHOL DO YOU HAVE ON A TYPICAL DAY: PATIENT DOES NOT DRINK

## 2023-10-26 ASSESSMENT — PAIN DESCRIPTION - LOCATION: LOCATION: CHEST

## 2023-10-26 ASSESSMENT — HEART SCORE: ECG: 0

## 2023-10-26 ASSESSMENT — PAIN - FUNCTIONAL ASSESSMENT: PAIN_FUNCTIONAL_ASSESSMENT: 0-10

## 2023-10-26 NOTE — ED TRIAGE NOTES
Pt coming from work with chest pain, trouble at night with freq urination, no other complaints with uti symptoms, ringing in the left ear for couple days left thigh pain, 2 l nc

## 2023-10-26 NOTE — DISCHARGE INSTRUCTIONS
mg/dL    AST 26 15 - 37 U/L    ALT 40 12 - 78 U/L    Alk Phosphatase 87 45 - 117 U/L    Total Protein 7.2 6.4 - 8.2 g/dL    Albumin 4.0 3.5 - 5.0 g/dL    Globulin 3.2 2.0 - 4.0 g/dL    Albumin/Globulin Ratio 1.3 1.1 - 2.2     Lipase    Collection Time: 10/26/23 10:09 AM   Result Value Ref Range    Lipase 23 13 - 75 U/L   Magnesium    Collection Time: 10/26/23 10:09 AM   Result Value Ref Range    Magnesium 2.2 1.6 - 2.4 mg/dL   Troponin    Collection Time: 10/26/23 10:10 AM   Result Value Ref Range    Troponin, High Sensitivity 16 0 - 76 ng/L   Troponin    Collection Time: 10/26/23 11:29 AM   Result Value Ref Range    Troponin, High Sensitivity 13 0 - 76 ng/L   Urinalysis with Reflex to Culture    Collection Time: 10/26/23 11:43 AM    Specimen: Urine   Result Value Ref Range    Color, UA Yellow/Straw      Appearance Clear Clear      Specific Gravity, UA 1.028 1.003 - 1.030      pH, Urine 5.0 5.0 - 8.0      Protein, UA 30 (A) Negative mg/dL    Glucose, UA Negative Negative mg/dL    Ketones, Urine 5 (A) Negative mg/dL    Bilirubin Urine Negative Negative      Blood, Urine Negative Negative      Urobilinogen, Urine 2.0 (H) 0.1 - 1.0 EU/dL    Nitrite, Urine Negative Negative      Leukocyte Esterase, Urine Negative Negative      WBC, UA 0-4 0 - 4 /hpf    RBC, UA 0-5 0 - 5 /hpf    Epithelial Cells UA Few Few /lpf    BACTERIA, URINE Negative Negative /hpf    Urine Culture if Indicated Culture not indicated by UA result Culture not indicated by UA result      Mucus, UA 4+ (A) Negative /lpf       Radiologic Studies  XR CHEST PORTABLE   Final Result   1. No acute cardiopulmonary process. ------------------------------------------------------------------------------------------------------------  The exam and treatment you received in the Emergency Department were for an urgent problem and are not intended as complete care.  It is important that you follow-up with a doctor, nurse practitioner, or physician assistant

## 2023-10-27 LAB
EKG ATRIAL RATE: 95 BPM
EKG DIAGNOSIS: NORMAL
EKG P AXIS: 25 DEGREES
EKG P-R INTERVAL: 168 MS
EKG Q-T INTERVAL: 324 MS
EKG QRS DURATION: 82 MS
EKG QTC CALCULATION (BAZETT): 407 MS
EKG R AXIS: 28 DEGREES
EKG T AXIS: 40 DEGREES
EKG VENTRICULAR RATE: 95 BPM

## 2023-11-02 NOTE — ED TRIAGE NOTES
GCS 15 + mask in triage; pt has swelling to face along with numbness, HA, and watery eyes that has been going on since yesterday; left sided facial drooping noted during triage no

## 2023-11-04 PROBLEM — E66.01 MORBID OBESITY WITH BMI OF 50.0-59.9, ADULT (HCC): Status: RESOLVED | Noted: 2022-01-16 | Resolved: 2023-11-04

## 2023-11-04 PROBLEM — Z13.1 SCREENING FOR DIABETES MELLITUS: Status: ACTIVE | Noted: 2022-04-26

## 2023-11-04 PROBLEM — Z12.11 SCREENING FOR COLON CANCER: Status: ACTIVE | Noted: 2022-04-26

## 2023-11-24 RX ORDER — METOPROLOL SUCCINATE 25 MG/1
25 TABLET, EXTENDED RELEASE ORAL DAILY
Qty: 90 TABLET | Refills: 3 | OUTPATIENT
Start: 2023-11-24

## 2023-11-28 RX ORDER — METOPROLOL SUCCINATE 25 MG/1
25 TABLET, EXTENDED RELEASE ORAL DAILY
Qty: 30 TABLET | Refills: 2 | Status: SHIPPED | OUTPATIENT
Start: 2023-11-28

## 2023-12-04 PROBLEM — Z12.11 SCREENING FOR COLON CANCER: Status: RESOLVED | Noted: 2022-04-26 | Resolved: 2023-12-04

## 2023-12-26 ENCOUNTER — HOSPITAL ENCOUNTER (EMERGENCY)
Facility: HOSPITAL | Age: 48
Discharge: HOME | End: 2023-12-26
Payer: MEDICARE

## 2023-12-26 VITALS
HEIGHT: 66 IN | DIASTOLIC BLOOD PRESSURE: 91 MMHG | BODY MASS INDEX: 43.39 KG/M2 | WEIGHT: 270 LBS | SYSTOLIC BLOOD PRESSURE: 178 MMHG | HEART RATE: 100 BPM | OXYGEN SATURATION: 96 % | RESPIRATION RATE: 16 BRPM | TEMPERATURE: 97.6 F

## 2023-12-26 DIAGNOSIS — M25.561 CHRONIC PAIN OF BOTH KNEES: Primary | ICD-10-CM

## 2023-12-26 DIAGNOSIS — M25.562 CHRONIC PAIN OF BOTH KNEES: Primary | ICD-10-CM

## 2023-12-26 DIAGNOSIS — G89.29 CHRONIC PAIN OF BOTH KNEES: Primary | ICD-10-CM

## 2023-12-26 PROCEDURE — 99283 EMERGENCY DEPT VISIT LOW MDM: CPT

## 2023-12-26 RX ORDER — NAPROXEN 500 MG/1
500 TABLET ORAL 2 TIMES DAILY
Qty: 60 TABLET | Refills: 0 | Status: SHIPPED | OUTPATIENT
Start: 2023-12-26

## 2023-12-26 NOTE — ED PROVIDER NOTES
Allergen Reactions    Cephalexin Anaphylaxis and Shortness Of Breath       PCP: Prudence Conway MD    Current Meds:   No current facility-administered medications for this encounter. Current Outpatient Medications   Medication Sig Dispense Refill    naproxen (NAPROSYN) 500 MG tablet Take 1 tablet by mouth 2 times daily 60 tablet 0    metoprolol succinate (TOPROL XL) 25 MG extended release tablet Take 1 tablet by mouth daily 30 tablet 2    acetaminophen (TYLENOL) 650 MG extended release tablet TAKE 1 TABLET TWICE DAILY AS NEEDED FOR PAIN      ergocalciferol (ERGOCALCIFEROL) 1.25 MG (69151 UT) capsule Take 1 capsule by mouth every 7 days 4 capsule 2    methocarbamol (ROBAXIN) 750 MG tablet Take 1 tablet by mouth 3 times daily as needed      omeprazole (PRILOSEC) 40 MG delayed release capsule Take 1 capsule by mouth daily      pravastatin (PRAVACHOL) 20 MG tablet Take 1 tablet by mouth nightly      Semaglutide-Weight Management (WEGOVY) 0.25 MG/0.5ML SOAJ SC injection Inject 0.25 mg into the skin every 7 days      omeprazole (PRILOSEC) 40 MG delayed release capsule TAKE 1 CAPSULE BY MOUTH DAILY.  TAKE 30 MINUTES BEFORE EATING ONCE A DAY INDICATIONS: STOMACH ULCER FROM ASPIRIN/IBUPROFEN-LIKE DRUGS PREVENTION 30 capsule 4       Social Determinants of Health:   Social Determinants of Health     Tobacco Use: Low Risk  (10/26/2023)    Patient History     Smoking Tobacco Use: Never     Smokeless Tobacco Use: Never     Passive Exposure: Not on file   Alcohol Use: Not At Risk (10/26/2023)    AUDIT-C     Frequency of Alcohol Consumption: Never     Average Number of Drinks: Patient does not drink     Frequency of Binge Drinking: Never   Financial Resource Strain: Low Risk  (12/7/2022)    Overall Financial Resource Strain (CARDIA)     Difficulty of Paying Living Expenses: Not hard at all   Food Insecurity: Not on file (12/7/2022)   Transportation Needs: Not on file   Physical Activity: Not on file   Stress: Not on file

## 2023-12-30 PROBLEM — Z13.1 SCREENING FOR DIABETES MELLITUS: Status: ACTIVE | Noted: 2023-12-30

## 2023-12-30 PROBLEM — M17.11 PRIMARY OSTEOARTHRITIS OF RIGHT KNEE: Status: ACTIVE | Noted: 2023-04-19

## 2023-12-30 PROBLEM — Z12.11 SCREEN FOR COLON CANCER: Status: ACTIVE | Noted: 2023-12-30

## 2024-01-29 PROBLEM — Z13.1 SCREENING FOR DIABETES MELLITUS: Status: RESOLVED | Noted: 2023-12-30 | Resolved: 2024-01-29

## 2024-01-29 PROBLEM — Z12.11 SCREEN FOR COLON CANCER: Status: RESOLVED | Noted: 2023-12-30 | Resolved: 2024-01-29

## 2024-02-27 ENCOUNTER — TELEPHONE (OUTPATIENT)
Facility: CLINIC | Age: 49
End: 2024-02-27

## 2024-02-27 RX ORDER — METOPROLOL SUCCINATE 25 MG/1
25 TABLET, EXTENDED RELEASE ORAL DAILY
Qty: 30 TABLET | Refills: 1 | Status: SHIPPED | OUTPATIENT
Start: 2024-02-27

## 2024-02-27 NOTE — TELEPHONE ENCOUNTER
[facility] called requesting a refill for the following medication:        metoprolol succinate (TOPROL XL) 25 MG extended release tablet           Please send to [facility]

## 2024-04-19 PROBLEM — R06.83 SNORING: Status: RESOLVED | Noted: 2022-12-07 | Resolved: 2024-04-19

## 2024-04-19 PROBLEM — Z12.11 SCREEN FOR COLON CANCER: Status: ACTIVE | Noted: 2024-04-19

## 2024-04-19 PROBLEM — G47.00 INSOMNIA, UNSPECIFIED TYPE: Status: RESOLVED | Noted: 2023-04-11 | Resolved: 2024-04-19

## 2024-04-19 PROBLEM — Z13.1 SCREENING FOR DIABETES MELLITUS: Status: ACTIVE | Noted: 2024-04-19

## 2024-04-24 ENCOUNTER — OFFICE VISIT (OUTPATIENT)
Facility: CLINIC | Age: 49
End: 2024-04-24
Payer: MEDICARE

## 2024-04-24 VITALS
WEIGHT: 311 LBS | DIASTOLIC BLOOD PRESSURE: 80 MMHG | SYSTOLIC BLOOD PRESSURE: 130 MMHG | OXYGEN SATURATION: 95 % | RESPIRATION RATE: 18 BRPM | HEART RATE: 84 BPM | BODY MASS INDEX: 49.98 KG/M2 | HEIGHT: 66 IN

## 2024-04-24 DIAGNOSIS — Z12.11 SCREEN FOR COLON CANCER: ICD-10-CM

## 2024-04-24 DIAGNOSIS — I10 ESSENTIAL HYPERTENSION: Primary | ICD-10-CM

## 2024-04-24 DIAGNOSIS — M25.561 CHRONIC PAIN OF RIGHT KNEE: ICD-10-CM

## 2024-04-24 DIAGNOSIS — E55.9 VITAMIN D DEFICIENCY: ICD-10-CM

## 2024-04-24 DIAGNOSIS — Z86.69 HX OF BELL'S PALSY: ICD-10-CM

## 2024-04-24 DIAGNOSIS — J45.20 MILD INTERMITTENT ASTHMA WITHOUT COMPLICATION: ICD-10-CM

## 2024-04-24 DIAGNOSIS — Z13.1 SCREENING FOR DIABETES MELLITUS: ICD-10-CM

## 2024-04-24 DIAGNOSIS — M17.0 PRIMARY OSTEOARTHRITIS OF BOTH KNEES: ICD-10-CM

## 2024-04-24 DIAGNOSIS — E78.5 DYSLIPIDEMIA: ICD-10-CM

## 2024-04-24 DIAGNOSIS — K21.9 GASTROESOPHAGEAL REFLUX DISEASE WITHOUT ESOPHAGITIS: ICD-10-CM

## 2024-04-24 DIAGNOSIS — G89.29 CHRONIC PAIN OF RIGHT KNEE: ICD-10-CM

## 2024-04-24 DIAGNOSIS — E66.01 MORBID OBESITY DUE TO EXCESS CALORIES (HCC): ICD-10-CM

## 2024-04-24 DIAGNOSIS — I10 ESSENTIAL HYPERTENSION: ICD-10-CM

## 2024-04-24 PROBLEM — Z12.4 SCREENING FOR CERVICAL CANCER: Status: ACTIVE | Noted: 2024-04-24

## 2024-04-24 PROCEDURE — G8427 DOCREV CUR MEDS BY ELIG CLIN: HCPCS | Performed by: INTERNAL MEDICINE

## 2024-04-24 PROCEDURE — 1036F TOBACCO NON-USER: CPT | Performed by: INTERNAL MEDICINE

## 2024-04-24 PROCEDURE — 3079F DIAST BP 80-89 MM HG: CPT | Performed by: INTERNAL MEDICINE

## 2024-04-24 PROCEDURE — G8417 CALC BMI ABV UP PARAM F/U: HCPCS | Performed by: INTERNAL MEDICINE

## 2024-04-24 PROCEDURE — 3075F SYST BP GE 130 - 139MM HG: CPT | Performed by: INTERNAL MEDICINE

## 2024-04-24 PROCEDURE — 99214 OFFICE O/P EST MOD 30 MIN: CPT | Performed by: INTERNAL MEDICINE

## 2024-04-24 RX ORDER — CELECOXIB 200 MG/1
200 CAPSULE ORAL ONCE AS NEEDED
Qty: 30 CAPSULE | Refills: 0 | Status: SHIPPED | OUTPATIENT
Start: 2024-04-24

## 2024-04-24 SDOH — ECONOMIC STABILITY: FOOD INSECURITY: WITHIN THE PAST 12 MONTHS, THE FOOD YOU BOUGHT JUST DIDN'T LAST AND YOU DIDN'T HAVE MONEY TO GET MORE.: NEVER TRUE

## 2024-04-24 SDOH — ECONOMIC STABILITY: INCOME INSECURITY: HOW HARD IS IT FOR YOU TO PAY FOR THE VERY BASICS LIKE FOOD, HOUSING, MEDICAL CARE, AND HEATING?: NOT HARD AT ALL

## 2024-04-24 SDOH — ECONOMIC STABILITY: FOOD INSECURITY: WITHIN THE PAST 12 MONTHS, YOU WORRIED THAT YOUR FOOD WOULD RUN OUT BEFORE YOU GOT MONEY TO BUY MORE.: NEVER TRUE

## 2024-04-24 SDOH — ECONOMIC STABILITY: HOUSING INSECURITY
IN THE LAST 12 MONTHS, WAS THERE A TIME WHEN YOU DID NOT HAVE A STEADY PLACE TO SLEEP OR SLEPT IN A SHELTER (INCLUDING NOW)?: NO

## 2024-04-24 ASSESSMENT — ENCOUNTER SYMPTOMS
ALLERGIC/IMMUNOLOGIC NEGATIVE: 1
EYES NEGATIVE: 1
RESPIRATORY NEGATIVE: 1
BACK PAIN: 0
GASTROINTESTINAL NEGATIVE: 1

## 2024-04-24 ASSESSMENT — PATIENT HEALTH QUESTIONNAIRE - PHQ9
SUM OF ALL RESPONSES TO PHQ QUESTIONS 1-9: 0
SUM OF ALL RESPONSES TO PHQ9 QUESTIONS 1 & 2: 0
2. FEELING DOWN, DEPRESSED OR HOPELESS: NOT AT ALL
SUM OF ALL RESPONSES TO PHQ QUESTIONS 1-9: 0
1. LITTLE INTEREST OR PLEASURE IN DOING THINGS: NOT AT ALL

## 2024-04-24 NOTE — PROGRESS NOTES
Chief Complaint   Patient presents with    Follow-up Chronic Condition    Weight Management    Knee Pain           /82 (Site: Left Upper Arm, Position: Sitting)   Pulse 90   Resp 18   Ht 1.676 m (5' 6\")   Wt (!) 141.1 kg (311 lb)   SpO2 95%   BMI 50.20 kg/m²       \"Have you been to the ER, urgent care clinic since your last visit?  Hospitalized since your last visit?\"    YES - When: approximately 4 months ago.  Where and Why: [facility] chronic knee pain.    “Have you seen or consulted any other health care providers outside of [facility] since your last visit?”    NO        “Have you had a colorectal cancer screening such as a colonoscopy/FIT/Cologuard?    NO    No colonoscopy on file  No cologuard on file  No FIT/FOBT on file   No flexible sigmoidoscopy on file         Click Here for Release of Records Request    
normal.       An electronic signature was used to authenticate this note.  -- Lor Hood MD

## 2024-05-02 LAB
25(OH)D3+25(OH)D2 SERPL-MCNC: 7.4 NG/ML (ref 30–100)
ALBUMIN SERPL-MCNC: 4.5 G/DL (ref 4.1–5.1)
ALBUMIN/GLOB SERPL: 1.9 {RATIO} (ref 1.2–2.2)
ALP SERPL-CCNC: 97 IU/L (ref 44–121)
ALT SERPL-CCNC: 15 IU/L (ref 0–44)
AST SERPL-CCNC: 20 IU/L (ref 0–40)
BASOPHILS # BLD AUTO: 0 X10E3/UL (ref 0–0.2)
BASOPHILS NFR BLD AUTO: 1 %
BILIRUB SERPL-MCNC: 0.6 MG/DL (ref 0–1.2)
BUN SERPL-MCNC: 9 MG/DL (ref 6–24)
BUN/CREAT SERPL: 11 (ref 9–20)
CALCIUM SERPL-MCNC: 9.2 MG/DL (ref 8.7–10.2)
CHLORIDE SERPL-SCNC: 101 MMOL/L (ref 96–106)
CHOLEST SERPL-MCNC: 186 MG/DL (ref 100–199)
CO2 SERPL-SCNC: 23 MMOL/L (ref 20–29)
CREAT SERPL-MCNC: 0.83 MG/DL (ref 0.76–1.27)
EGFRCR SERPLBLD CKD-EPI 2021: 109 ML/MIN/1.73
EOSINOPHIL # BLD AUTO: 0.2 X10E3/UL (ref 0–0.4)
EOSINOPHIL NFR BLD AUTO: 3 %
ERYTHROCYTE [DISTWIDTH] IN BLOOD BY AUTOMATED COUNT: 13 % (ref 11.6–15.4)
GLOBULIN SER CALC-MCNC: 2.4 G/DL (ref 1.5–4.5)
GLUCOSE SERPL-MCNC: 103 MG/DL (ref 70–99)
HBA1C MFR BLD: 5.5 % (ref 4.8–5.6)
HCT VFR BLD AUTO: 46 % (ref 37.5–51)
HDLC SERPL-MCNC: 37 MG/DL
HGB BLD-MCNC: 15.8 G/DL (ref 13–17.7)
IMM GRANULOCYTES # BLD AUTO: 0 X10E3/UL (ref 0–0.1)
IMM GRANULOCYTES NFR BLD AUTO: 0 %
LDLC SERPL CALC-MCNC: 132 MG/DL (ref 0–99)
LYMPHOCYTES # BLD AUTO: 2.2 X10E3/UL (ref 0.7–3.1)
LYMPHOCYTES NFR BLD AUTO: 31 %
MCH RBC QN AUTO: 30.6 PG (ref 26.6–33)
MCHC RBC AUTO-ENTMCNC: 34.3 G/DL (ref 31.5–35.7)
MCV RBC AUTO: 89 FL (ref 79–97)
MONOCYTES # BLD AUTO: 0.8 X10E3/UL (ref 0.1–0.9)
MONOCYTES NFR BLD AUTO: 11 %
NEUTROPHILS # BLD AUTO: 3.8 X10E3/UL (ref 1.4–7)
NEUTROPHILS NFR BLD AUTO: 54 %
PLATELET # BLD AUTO: 329 X10E3/UL (ref 150–450)
POTASSIUM SERPL-SCNC: 4 MMOL/L (ref 3.5–5.2)
PROT SERPL-MCNC: 6.9 G/DL (ref 6–8.5)
RBC # BLD AUTO: 5.17 X10E6/UL (ref 4.14–5.8)
SODIUM SERPL-SCNC: 139 MMOL/L (ref 134–144)
TRIGL SERPL-MCNC: 93 MG/DL (ref 0–149)
VLDLC SERPL CALC-MCNC: 17 MG/DL (ref 5–40)
WBC # BLD AUTO: 6.9 X10E3/UL (ref 3.4–10.8)

## 2024-05-02 RX ORDER — ERGOCALCIFEROL 1.25 MG/1
50000 CAPSULE ORAL WEEKLY
Qty: 12 CAPSULE | Refills: 1 | Status: SHIPPED | OUTPATIENT
Start: 2024-05-02

## 2024-05-10 ENCOUNTER — TELEPHONE (OUTPATIENT)
Facility: CLINIC | Age: 49
End: 2024-05-10

## 2024-05-10 DIAGNOSIS — G89.29 CHRONIC PAIN OF RIGHT KNEE: Primary | ICD-10-CM

## 2024-05-10 DIAGNOSIS — M25.561 CHRONIC PAIN OF RIGHT KNEE: Primary | ICD-10-CM

## 2024-05-10 DIAGNOSIS — M17.11 UNILATERAL PRIMARY OSTEOARTHRITIS, RIGHT KNEE: ICD-10-CM

## 2024-05-10 NOTE — TELEPHONE ENCOUNTER
Patient called and stated that his legs are worse than before. He has gone to the er and they stated he has arthritis but he would like a referral to see a specialist. He can hardly walk. Please advise .

## 2024-05-19 PROBLEM — Z13.1 SCREENING FOR DIABETES MELLITUS: Status: RESOLVED | Noted: 2024-04-19 | Resolved: 2024-05-19

## 2024-05-19 PROBLEM — Z12.11 SCREEN FOR COLON CANCER: Status: RESOLVED | Noted: 2024-04-19 | Resolved: 2024-05-19

## 2024-05-24 PROBLEM — Z12.4 SCREENING FOR CERVICAL CANCER: Status: RESOLVED | Noted: 2024-04-24 | Resolved: 2024-05-24

## 2024-05-28 RX ORDER — METOPROLOL SUCCINATE 25 MG/1
25 TABLET, EXTENDED RELEASE ORAL DAILY
Qty: 30 TABLET | Refills: 5 | Status: SHIPPED | OUTPATIENT
Start: 2024-05-28

## 2024-06-24 RX ORDER — METOPROLOL SUCCINATE 25 MG/1
25 TABLET, EXTENDED RELEASE ORAL DAILY
Qty: 60 TABLET | Refills: 5 | Status: SHIPPED | OUTPATIENT
Start: 2024-06-24

## 2024-07-06 ENCOUNTER — HOSPITAL ENCOUNTER (EMERGENCY)
Facility: HOSPITAL | Age: 49
Discharge: HOME | End: 2024-07-06
Attending: STUDENT IN AN ORGANIZED HEALTH CARE EDUCATION/TRAINING PROGRAM
Payer: MEDICARE

## 2024-07-06 VITALS
WEIGHT: 315 LBS | OXYGEN SATURATION: 100 % | TEMPERATURE: 97.3 F | HEIGHT: 66 IN | SYSTOLIC BLOOD PRESSURE: 155 MMHG | BODY MASS INDEX: 50.62 KG/M2 | DIASTOLIC BLOOD PRESSURE: 60 MMHG | HEART RATE: 88 BPM | RESPIRATION RATE: 20 BRPM

## 2024-07-06 DIAGNOSIS — K02.9 DENTAL CARIES: ICD-10-CM

## 2024-07-06 DIAGNOSIS — K08.89 PAIN, DENTAL: Primary | ICD-10-CM

## 2024-07-06 PROCEDURE — 96372 THER/PROPH/DIAG INJ SC/IM: CPT

## 2024-07-06 PROCEDURE — 6370000000 HC RX 637 (ALT 250 FOR IP): Performed by: STUDENT IN AN ORGANIZED HEALTH CARE EDUCATION/TRAINING PROGRAM

## 2024-07-06 PROCEDURE — 99284 EMERGENCY DEPT VISIT MOD MDM: CPT

## 2024-07-06 PROCEDURE — 6360000002 HC RX W HCPCS: Performed by: STUDENT IN AN ORGANIZED HEALTH CARE EDUCATION/TRAINING PROGRAM

## 2024-07-06 RX ORDER — AMOXICILLIN AND CLAVULANATE POTASSIUM 875; 125 MG/1; MG/1
1 TABLET, FILM COATED ORAL
Status: COMPLETED | OUTPATIENT
Start: 2024-07-06 | End: 2024-07-06

## 2024-07-06 RX ORDER — KETOROLAC TROMETHAMINE 10 MG/1
10 TABLET, FILM COATED ORAL EVERY 6 HOURS PRN
Qty: 20 TABLET | Refills: 0 | Status: SHIPPED | OUTPATIENT
Start: 2024-07-06

## 2024-07-06 RX ORDER — AMOXICILLIN AND CLAVULANATE POTASSIUM 875; 125 MG/1; MG/1
1 TABLET, FILM COATED ORAL 2 TIMES DAILY
Qty: 14 TABLET | Refills: 0 | Status: SHIPPED | OUTPATIENT
Start: 2024-07-06 | End: 2024-07-13

## 2024-07-06 RX ORDER — KETOROLAC TROMETHAMINE 30 MG/ML
30 INJECTION, SOLUTION INTRAMUSCULAR; INTRAVENOUS
Status: COMPLETED | OUTPATIENT
Start: 2024-07-06 | End: 2024-07-06

## 2024-07-06 RX ADMIN — AMOXICILLIN AND CLAVULANATE POTASSIUM 1 TABLET: 875; 125 TABLET, FILM COATED ORAL at 21:51

## 2024-07-06 RX ADMIN — KETOROLAC TROMETHAMINE 30 MG: 30 INJECTION, SOLUTION INTRAMUSCULAR at 21:52

## 2024-07-06 ASSESSMENT — PAIN SCALES - GENERAL: PAINLEVEL_OUTOF10: 10

## 2024-07-06 ASSESSMENT — PAIN DESCRIPTION - LOCATION: LOCATION: TEETH;EAR

## 2024-07-07 NOTE — ED PROVIDER NOTES
[facility] EMERGENCY DEPT  EMERGENCY DEPARTMENT HISTORY AND PHYSICAL EXAM      Date: 7/6/2024  Patient Name: Kirby Andersen  MRN: [id number]  Birthdate 1975  Date of evaluation: 7/6/2024  Provider: Willard Johnson MD   Note Started: 9:35 PM EDT 7/6/24    HISTORY OF PRESENT ILLNESS     Chief Complaint   Patient presents with    Dental Pain       History Provided By: Patient    HPI: Kirby Andersen is a 49 y.o. male presenting with dental pain and swelling to his left jaw.  He reports he has multiple cavities and has had dental infection before.  He does not have a dentist and cannot see one over the weekend.  Denies any fevers, nausea, chills, night sweats.  He reports pain along the left jaw and mild pain to the ear in the last 24 hours.  Denies difficulty breathing or swallowing.    PAST MEDICAL HISTORY   Past Medical History:  Past Medical History:   Diagnosis Date    Hand cramps 10/10/2020    Hypercholesterolemia     Hypertension     Impotence 10/10/2020    Increased frequency of urination 10/10/2020    Leukocytosis 10/10/2020    Migraines     Mild intermittent asthma without complication 01/18/2021    Morbid obesity with BMI of 50.0-59.9, adult (HCC)     Multiple acquired skin tags 10/10/2020    Osteoarthritis of right knee, unspecified osteoarthritis type 4/19/2023    Pain in both hands 10/10/2020    Paresthesia of left upper limb 10/10/2020    Seasonal allergic rhinitis 10/10/2020    Vitamin D deficiency 10/10/2020       Past Surgical History:  Past Surgical History:   Procedure Laterality Date    APPENDECTOMY  1993    PROSTATE SURGERY      UROLOGICAL SURGERY         Family History:  Family History   Adopted: Yes       Social History:  Social History     Tobacco Use    Smoking status: Never    Smokeless tobacco: Never   Substance Use Topics    Alcohol use: No    Drug use: Never       Allergies:  Allergies   Allergen Reactions    Cephalexin Anaphylaxis and Shortness Of Breath       PCP: Erwin

## 2024-07-07 NOTE — DISCHARGE INSTRUCTIONS
MD    Independent Practice  Dave Dill MD: [address], [place] [address]. [phone]  Nestor Gamble MD: [address] [facility]. [phone]      Dentist/Dental resources:    [facility] [place]  [address] [place], [place] [id number] [phone]    [facility]  [address], [address]  [place] [address] [phone]    [facility]  [address] [place] [address]  [phone]    [facility]   [address] [facility] [address]   Phone [phone] or [phone]   Hours 46ef-04-05ev (extractions)   Simple tooth extraction: $60 per tooth, $55 per x-ray     Non-Urgent Dental Care Clinics   [facility] at [address] [address]   Dental Clinic: [phone]   Oral Surgery Clinic: [phone]     Emergency Dental Care   [facility]   [address] [address]   Monday, Wednesday, Friday: 8am-5pm   Tuesday and Thursday: 8am-6pm   Phone: [phone]   $70 for Emergency Care   $60 for first routine care, then pay by sliding scale based upon income     [address] [address]   Phone: [phone], select option (2) to confirm time for treatment     [facility]   [address] [place] [address]   Monday-Friday: 8am-4pm   Phone: [phone]     [facility]   [facility], [facility], [facility] [address]   Phone: [phone] to confirm a time for emergency treatment   Pediatrics: [phone]   $75 per tooth, extractions only

## 2024-07-15 ENCOUNTER — TELEPHONE (OUTPATIENT)
Age: 49
End: 2024-07-15

## 2024-07-15 NOTE — TELEPHONE ENCOUNTER
----- Message from  Hilario Gardunoshaynenaresh sent at 7/15/2024 11:12 AM EDT -----  Regarding: E[facility] Appointment Request  E[facility] Appointment Request    Patient needs appointment for E[facility] Appointment Type: Existing Condition Follow Up.    Patient Requested Dates(s):  Patient Requested Time:  Provider Name:rojas escobedo    Reason for Appointment Request: Established Patient - Available appointments did not meet patient need. Wanted a appointment around late September either Monday or Wednesday   --------------------------------------------------------------------------------------------------------------------------    Relationship to Patient: Self     Call Back Information: OK to leave message on voicemail  Preferred Call Back Number: Phone [phone]

## 2024-07-16 RX ORDER — IBUPROFEN 600 MG/1
600 TABLET ORAL 2 TIMES DAILY PRN
Qty: 10 TABLET | Refills: 0 | Status: SHIPPED | OUTPATIENT
Start: 2024-07-16

## 2024-07-25 RX ORDER — IBUPROFEN 600 MG/1
TABLET ORAL
Qty: 10 TABLET | Refills: 0 | OUTPATIENT
Start: 2024-07-25

## 2024-08-03 PROBLEM — E66.01 MORBID OBESITY DUE TO EXCESS CALORIES: Status: RESOLVED | Noted: 2020-10-12 | Resolved: 2024-08-03

## 2024-09-18 ENCOUNTER — TELEPHONE (OUTPATIENT)
Facility: CLINIC | Age: 49
End: 2024-09-18

## 2024-09-18 DIAGNOSIS — M79.89 SWELLING OF RIGHT FOOT: ICD-10-CM

## 2024-09-18 DIAGNOSIS — M79.89 RIGHT LEG SWELLING: Primary | ICD-10-CM

## 2024-10-07 ENCOUNTER — OFFICE VISIT (OUTPATIENT)
Age: 49
End: 2024-10-07
Payer: MEDICARE

## 2024-10-07 VITALS
HEART RATE: 85 BPM | WEIGHT: 295.5 LBS | HEIGHT: 66 IN | SYSTOLIC BLOOD PRESSURE: 134 MMHG | BODY MASS INDEX: 47.49 KG/M2 | TEMPERATURE: 98.2 F | DIASTOLIC BLOOD PRESSURE: 82 MMHG | OXYGEN SATURATION: 96 % | RESPIRATION RATE: 20 BRPM

## 2024-10-07 DIAGNOSIS — M79.606 PAIN OF LOWER EXTREMITY, UNSPECIFIED LATERALITY: Primary | ICD-10-CM

## 2024-10-07 PROCEDURE — G8427 DOCREV CUR MEDS BY ELIG CLIN: HCPCS | Performed by: SURGERY

## 2024-10-07 PROCEDURE — G8484 FLU IMMUNIZE NO ADMIN: HCPCS | Performed by: SURGERY

## 2024-10-07 PROCEDURE — 3079F DIAST BP 80-89 MM HG: CPT | Performed by: SURGERY

## 2024-10-07 PROCEDURE — 99203 OFFICE O/P NEW LOW 30 MIN: CPT | Performed by: SURGERY

## 2024-10-07 PROCEDURE — G8417 CALC BMI ABV UP PARAM F/U: HCPCS | Performed by: SURGERY

## 2024-10-07 PROCEDURE — 3075F SYST BP GE 130 - 139MM HG: CPT | Performed by: SURGERY

## 2024-10-07 PROCEDURE — 1036F TOBACCO NON-USER: CPT | Performed by: SURGERY

## 2024-10-07 ASSESSMENT — PATIENT HEALTH QUESTIONNAIRE - PHQ9
SUM OF ALL RESPONSES TO PHQ QUESTIONS 1-9: 0
SUM OF ALL RESPONSES TO PHQ QUESTIONS 1-9: 0
SUM OF ALL RESPONSES TO PHQ9 QUESTIONS 1 & 2: 0
SUM OF ALL RESPONSES TO PHQ QUESTIONS 1-9: 0
SUM OF ALL RESPONSES TO PHQ QUESTIONS 1-9: 0
2. FEELING DOWN, DEPRESSED OR HOPELESS: NOT AT ALL
1. LITTLE INTEREST OR PLEASURE IN DOING THINGS: NOT AT ALL

## 2024-10-07 NOTE — PROGRESS NOTES
Identified pt with two pt identifiers (name and ). Reviewed chart in preparation for visit and have obtained necessary documentation.    Kirby Andersen is a 49 y.o. male  Chief Complaint   Patient presents with    New Patient     Bilateral Leg swelling, difficulty walking, bilateral leg cramping     /82 (Site: Left Upper Arm, Position: Sitting, Cuff Size: Large Adult)   Pulse 85   Temp 98.2 °F (36.8 °C) (Oral)   Resp 20   Ht 1.676 m (5' 6\")   Wt 134 kg (295 lb 8 oz)   SpO2 96%   BMI 47.69 kg/m²

## 2024-10-14 PROBLEM — M79.606 PAIN OF LOWER EXTREMITY: Status: ACTIVE | Noted: 2021-09-16

## 2024-10-14 NOTE — PROGRESS NOTES
Vascular History and Physical    Patient: Kirby Andersen  MRN: [id number]    YOB: 1975  Age: 49 y.o.  Sex: male     Chief Complaint:  Chief Complaint   Patient presents with    New Patient     Bilateral Leg swelling, difficulty walking, bilateral leg cramping       History of Present Illness: Kirby Andersen is a 49 y.o. very pleasant gentleman is here today for vascular assessment.  Patient complaining of the leg pain and swelling.  Patient describes pain with associated ambulation and activities.  Patient denies any chest pain shortness of breath.  Denies previous leg injury.  Denies history of DVT.  She had ANAHI examination done last year April 2023 which shows right ankle index 0.96, left ankle index 1.1.  Patient also had a right knee injection therapy done for arthritis.  Since then leg pain has somewhat improved.  Patient is otherwise pretty active.    Social History:  Social Connections: Not on file       Past Medical History:  Past Medical History:   Diagnosis Date    Hand cramps 10/10/2020    Hypercholesterolemia     Hypertension     Impotence 10/10/2020    Increased frequency of urination 10/10/2020    Leukocytosis 10/10/2020    Migraines     Mild intermittent asthma without complication 01/18/2021    Morbid obesity with BMI of 50.0-59.9, adult     Multiple acquired skin tags 10/10/2020    Osteoarthritis of right knee, unspecified osteoarthritis type 4/19/2023    Pain in both hands 10/10/2020    Paresthesia of left upper limb 10/10/2020    Seasonal allergic rhinitis 10/10/2020    Vitamin D deficiency 10/10/2020       Surgical History:  Past Surgical History:   Procedure Laterality Date    APPENDECTOMY  1993    PROSTATE SURGERY      UROLOGICAL SURGERY         Allergies:  Allergies   Allergen Reactions    Cephalexin Anaphylaxis and Shortness Of Breath       Current Meds:  Current Outpatient Medications   Medication Sig Dispense Refill    ibuprofen (IBU) 600 MG tablet Take 1

## 2024-11-15 NOTE — TELEPHONE ENCOUNTER
I have attempted to contact this patient by phone with the following results: left message to return my call on answering machine.    
Patient came in today and stated that while he was on vacation his right leg swelled and his foot as well. He is very concern. He did state that the emergency squad came out and evaluated him.They stated that he needed to reach out to him his PCP. He would like a referral to see a specialist asap. Please advise.   
Pt has an appointment with Dr. Gallegos scheduled.   
negative -  no rash

## 2024-11-17 ENCOUNTER — HOSPITAL ENCOUNTER (EMERGENCY)
Facility: HOSPITAL | Age: 49
Discharge: HOME | End: 2024-11-17
Payer: MEDICARE

## 2024-11-17 ENCOUNTER — APPOINTMENT (OUTPATIENT)
Facility: HOSPITAL | Age: 49
End: 2024-11-17
Payer: MEDICARE

## 2024-11-17 VITALS
TEMPERATURE: 98.6 F | SYSTOLIC BLOOD PRESSURE: 125 MMHG | WEIGHT: 278 LBS | HEIGHT: 66 IN | OXYGEN SATURATION: 98 % | DIASTOLIC BLOOD PRESSURE: 66 MMHG | HEART RATE: 81 BPM | BODY MASS INDEX: 44.68 KG/M2 | RESPIRATION RATE: 15 BRPM

## 2024-11-17 DIAGNOSIS — R11.2 NAUSEA AND VOMITING, UNSPECIFIED VOMITING TYPE: Primary | ICD-10-CM

## 2024-11-17 DIAGNOSIS — R10.84 GENERALIZED ABDOMINAL PAIN: ICD-10-CM

## 2024-11-17 DIAGNOSIS — R09.81 NASAL CONGESTION: ICD-10-CM

## 2024-11-17 LAB
ALBUMIN SERPL-MCNC: 3.9 G/DL (ref 3.5–5)
ALBUMIN/GLOB SERPL: 1.2 (ref 1.1–2.2)
ALP SERPL-CCNC: 91 U/L (ref 45–117)
ALT SERPL-CCNC: 26 U/L (ref 12–78)
ANION GAP SERPL CALC-SCNC: 8 MMOL/L (ref 2–12)
AST SERPL W P-5'-P-CCNC: 17 U/L (ref 15–37)
BASOPHILS # BLD: 0.1 K/UL (ref 0–0.1)
BASOPHILS NFR BLD: 1 % (ref 0–1)
BILIRUB SERPL-MCNC: 0.4 MG/DL (ref 0.2–1)
BUN SERPL-MCNC: 12 MG/DL (ref 6–20)
BUN/CREAT SERPL: 13 (ref 12–20)
CA-I BLD-MCNC: 8.9 MG/DL (ref 8.5–10.1)
CHLORIDE SERPL-SCNC: 104 MMOL/L (ref 97–108)
CO2 SERPL-SCNC: 26 MMOL/L (ref 21–32)
CREAT SERPL-MCNC: 0.91 MG/DL (ref 0.7–1.3)
DIFFERENTIAL METHOD BLD: ABNORMAL
EOSINOPHIL # BLD: 0.2 K/UL (ref 0–0.4)
EOSINOPHIL NFR BLD: 2 % (ref 0–7)
ERYTHROCYTE [DISTWIDTH] IN BLOOD BY AUTOMATED COUNT: 12.3 % (ref 11.5–14.5)
FLUAV RNA SPEC QL NAA+PROBE: NOT DETECTED
FLUBV RNA SPEC QL NAA+PROBE: NOT DETECTED
GLOBULIN SER CALC-MCNC: 3.2 G/DL (ref 2–4)
GLUCOSE SERPL-MCNC: 108 MG/DL (ref 65–100)
HCT VFR BLD AUTO: 43.7 % (ref 36.6–50.3)
HGB BLD-MCNC: 16.2 G/DL (ref 12.1–17)
IMM GRANULOCYTES # BLD AUTO: 0 K/UL (ref 0–0.04)
IMM GRANULOCYTES NFR BLD AUTO: 1 % (ref 0–0.5)
LIPASE SERPL-CCNC: 25 U/L (ref 13–75)
LYMPHOCYTES # BLD: 2.3 K/UL (ref 0.8–3.5)
LYMPHOCYTES NFR BLD: 34 % (ref 12–49)
MCH RBC QN AUTO: 31 PG (ref 26–34)
MCHC RBC AUTO-ENTMCNC: 37.1 G/DL (ref 30–36.5)
MCV RBC AUTO: 83.7 FL (ref 80–99)
MONOCYTES # BLD: 0.8 K/UL (ref 0–1)
MONOCYTES NFR BLD: 12 % (ref 5–13)
NEUTS SEG # BLD: 3.3 K/UL (ref 1.8–8)
NEUTS SEG NFR BLD: 50 % (ref 32–75)
NRBC # BLD: 0 K/UL (ref 0–0.01)
NRBC BLD-RTO: 0 PER 100 WBC
PLATELET # BLD AUTO: 296 K/UL (ref 150–400)
PMV BLD AUTO: 9.1 FL (ref 8.9–12.9)
POTASSIUM SERPL-SCNC: 4.2 MMOL/L (ref 3.5–5.1)
PROT SERPL-MCNC: 7.1 G/DL (ref 6.4–8.2)
RBC # BLD AUTO: 5.22 M/UL (ref 4.1–5.7)
SARS-COV-2 RNA RESP QL NAA+PROBE: NOT DETECTED
SODIUM SERPL-SCNC: 138 MMOL/L (ref 136–145)
TROPONIN I SERPL HS-MCNC: 9 NG/L (ref 0–76)
WBC # BLD AUTO: 6.6 K/UL (ref 4.1–11.1)

## 2024-11-17 PROCEDURE — 80053 COMPREHEN METABOLIC PANEL: CPT

## 2024-11-17 PROCEDURE — 96374 THER/PROPH/DIAG INJ IV PUSH: CPT

## 2024-11-17 PROCEDURE — 83690 ASSAY OF LIPASE: CPT

## 2024-11-17 PROCEDURE — 6370000000 HC RX 637 (ALT 250 FOR IP)

## 2024-11-17 PROCEDURE — 87636 SARSCOV2 & INF A&B AMP PRB: CPT

## 2024-11-17 PROCEDURE — 96375 TX/PRO/DX INJ NEW DRUG ADDON: CPT

## 2024-11-17 PROCEDURE — 99285 EMERGENCY DEPT VISIT HI MDM: CPT

## 2024-11-17 PROCEDURE — 71045 X-RAY EXAM CHEST 1 VIEW: CPT

## 2024-11-17 PROCEDURE — 85025 COMPLETE CBC W/AUTO DIFF WBC: CPT

## 2024-11-17 PROCEDURE — 36415 COLL VENOUS BLD VENIPUNCTURE: CPT

## 2024-11-17 PROCEDURE — 74176 CT ABD & PELVIS W/O CONTRAST: CPT

## 2024-11-17 PROCEDURE — 84484 ASSAY OF TROPONIN QUANT: CPT

## 2024-11-17 PROCEDURE — 93005 ELECTROCARDIOGRAM TRACING: CPT | Performed by: STUDENT IN AN ORGANIZED HEALTH CARE EDUCATION/TRAINING PROGRAM

## 2024-11-17 PROCEDURE — 6360000002 HC RX W HCPCS

## 2024-11-17 RX ORDER — ONDANSETRON 2 MG/ML
4 INJECTION INTRAMUSCULAR; INTRAVENOUS ONCE
Status: COMPLETED | OUTPATIENT
Start: 2024-11-17 | End: 2024-11-17

## 2024-11-17 RX ORDER — GUAIFENESIN 600 MG/1
600 TABLET, EXTENDED RELEASE ORAL 2 TIMES DAILY
Qty: 30 TABLET | Refills: 0 | Status: SHIPPED | OUTPATIENT
Start: 2024-11-17 | End: 2024-12-02

## 2024-11-17 RX ORDER — DICYCLOMINE HCL 20 MG
20 TABLET ORAL 4 TIMES DAILY
Qty: 30 TABLET | Refills: 0 | Status: SHIPPED | OUTPATIENT
Start: 2024-11-17

## 2024-11-17 RX ORDER — DICYCLOMINE HCL 20 MG
20 TABLET ORAL ONCE
Status: COMPLETED | OUTPATIENT
Start: 2024-11-17 | End: 2024-11-17

## 2024-11-17 RX ORDER — KETOROLAC TROMETHAMINE 30 MG/ML
30 INJECTION, SOLUTION INTRAMUSCULAR; INTRAVENOUS ONCE
Status: COMPLETED | OUTPATIENT
Start: 2024-11-17 | End: 2024-11-17

## 2024-11-17 RX ORDER — BUTALBITAL, ACETAMINOPHEN AND CAFFEINE 50; 325; 40 MG/1; MG/1; MG/1
2 TABLET ORAL
Status: COMPLETED | OUTPATIENT
Start: 2024-11-17 | End: 2024-11-17

## 2024-11-17 RX ORDER — ONDANSETRON 4 MG/1
4 TABLET, FILM COATED ORAL 3 TIMES DAILY PRN
Qty: 15 TABLET | Refills: 0 | Status: SHIPPED | OUTPATIENT
Start: 2024-11-17

## 2024-11-17 RX ADMIN — ONDANSETRON 4 MG: 2 INJECTION INTRAMUSCULAR; INTRAVENOUS at 11:11

## 2024-11-17 RX ADMIN — DICYCLOMINE HYDROCHLORIDE 20 MG: 20 TABLET ORAL at 14:32

## 2024-11-17 RX ADMIN — KETOROLAC TROMETHAMINE 30 MG: 30 INJECTION, SOLUTION INTRAMUSCULAR; INTRAVENOUS at 11:11

## 2024-11-17 RX ADMIN — BUTALBITAL, ACETAMINOPHEN, AND CAFFEINE 2 TABLET: 50; 325; 40 TABLET ORAL at 11:11

## 2024-11-17 ASSESSMENT — PAIN DESCRIPTION - LOCATION
LOCATION: HEAD
LOCATION: ABDOMEN;HEAD
LOCATION: ABDOMEN

## 2024-11-17 ASSESSMENT — PAIN SCALES - GENERAL
PAINLEVEL_OUTOF10: 10
PAINLEVEL_OUTOF10: 5
PAINLEVEL_OUTOF10: 4

## 2024-11-17 ASSESSMENT — PAIN - FUNCTIONAL ASSESSMENT
PAIN_FUNCTIONAL_ASSESSMENT: 0-10
PAIN_FUNCTIONAL_ASSESSMENT: 0-10

## 2024-11-17 ASSESSMENT — LIFESTYLE VARIABLES
HOW MANY STANDARD DRINKS CONTAINING ALCOHOL DO YOU HAVE ON A TYPICAL DAY: PATIENT DOES NOT DRINK
HOW OFTEN DO YOU HAVE A DRINK CONTAINING ALCOHOL: NEVER

## 2024-11-17 NOTE — ED PROVIDER NOTES
[facility] EMERGENCY DEPT  EMERGENCY DEPARTMENT HISTORY AND PHYSICAL EXAM      Date: 11/17/2024  Patient Name: Kirby Andersen  MRN: [id number]  YOB: 1975  Date of evaluation: 11/17/2024  Provider: Amy Dennis PA-C   Note Started: 11:09 AM EST 11/17/24    HISTORY OF PRESENT ILLNESS     Chief Complaint   Patient presents with    Headache    Abdominal Pain       History Provided By: Patient    HPI: Kirby Andersen is a 49 y.o. male with past medical history listed below, presents for evaluation of nasal congestion, headache, abdominal pain, vomiting x 1 day.  Patient states that it began while he was at [employer] earlier today when he suddenly started to feel lightheaded, have abdominal pain and headache.  He does have a nasal congestion for a few days now.  He denies any sick contacts that he is aware of.  He has not tried taking home medication help with his symptoms. Denies any shortness of breath, difficulty breathing, rash, night sweats, or chest pain.     PAST MEDICAL HISTORY   Past Medical History:  Past Medical History:   Diagnosis Date    Hand cramps 10/10/2020    Hypercholesterolemia     Hypertension     Impotence 10/10/2020    Increased frequency of urination 10/10/2020    Leukocytosis 10/10/2020    Migraines     Mild intermittent asthma without complication 01/18/2021    Morbid obesity with BMI of 50.0-59.9, adult     Multiple acquired skin tags 10/10/2020    Osteoarthritis of right knee, unspecified osteoarthritis type 4/19/2023    Pain in both hands 10/10/2020    Paresthesia of left upper limb 10/10/2020    Seasonal allergic rhinitis 10/10/2020    Vitamin D deficiency 10/10/2020       Past Surgical History:  Past Surgical History:   Procedure Laterality Date    APPENDECTOMY  1993    PROSTATE SURGERY      UROLOGICAL SURGERY         Family History:  Family History   Adopted: Yes       Social History:  Social History     Tobacco Use    Smoking status: Never    Smokeless tobacco: Never

## 2024-11-17 NOTE — DISCHARGE INSTRUCTIONS
Thank you!  Thank you for allowing me to care for you in the emergency department. It is my goal to provide you with excellent care. If you have not received excellent quality care, please ask to speak to the nurse manager. Please fill out the survey that will come to you by mail or email since we listen to your feedback!     Below you will find a list of your tests from today's visit.  Should you have any questions, please do not hesitate to call the emergency department.    Labs  Recent Results (from the past 12 hour(s))   CBC with Auto Differential    Collection Time: 11/17/24 11:10 AM   Result Value Ref Range    WBC 6.6 4.1 - 11.1 K/uL    RBC 5.22 4.10 - 5.70 M/uL    Hemoglobin 16.2 12.1 - 17.0 g/dL    Hematocrit 43.7 36.6 - 50.3 %    MCV 83.7 80.0 - 99.0 FL    MCH 31.0 26.0 - 34.0 PG    MCHC 37.1 (H) 30.0 - 36.5 g/dL    RDW 12.3 11.5 - 14.5 %    Platelets 296 150 - 400 K/uL    MPV 9.1 8.9 - 12.9 FL    Nucleated RBCs 0.0 0.0  WBC    nRBC 0.00 0.00 - 0.01 K/uL    Neutrophils % 50 32 - 75 %    Lymphocytes % 34 12 - 49 %    Monocytes % 12 5 - 13 %    Eosinophils % 2 0 - 7 %    Basophils % 1 0 - 1 %    Immature Granulocytes % 1 (H) 0 - 0.5 %    Neutrophils Absolute 3.3 1.8 - 8.0 K/UL    Lymphocytes Absolute 2.3 0.8 - 3.5 K/UL    Monocytes Absolute 0.8 0.0 - 1.0 K/UL    Eosinophils Absolute 0.2 0.0 - 0.4 K/UL    Basophils Absolute 0.1 0.0 - 0.1 K/UL    Immature Granulocytes Absolute 0.0 0.00 - 0.04 K/UL    Differential Type AUTOMATED     Comprehensive Metabolic Panel    Collection Time: 11/17/24 11:10 AM   Result Value Ref Range    Sodium 138 136 - 145 mmol/L    Potassium 4.2 3.5 - 5.1 mmol/L    Chloride 104 97 - 108 mmol/L    CO2 26 21 - 32 mmol/L    Anion Gap 8 2 - 12 mmol/L    Glucose 108 (H) 65 - 100 mg/dL    BUN 12 6 - 20 mg/dL    Creatinine 0.91 0.70 - 1.30 mg/dL    BUN/Creatinine Ratio 13 12 - 20      Est, Glom Filt Rate >90 >60 ml/min/1.73m2    Calcium 8.9 8.5 - 10.1 mg/dL    Total Bilirubin 0.4 0.2 -

## 2024-11-18 LAB
EKG ATRIAL RATE: 88 BPM
EKG DIAGNOSIS: NORMAL
EKG P AXIS: 21 DEGREES
EKG P-R INTERVAL: 156 MS
EKG Q-T INTERVAL: 332 MS
EKG QRS DURATION: 80 MS
EKG QTC CALCULATION (BAZETT): 401 MS
EKG R AXIS: 21 DEGREES
EKG T AXIS: 24 DEGREES
EKG VENTRICULAR RATE: 88 BPM

## 2024-12-26 ENCOUNTER — TELEPHONE (OUTPATIENT)
Facility: CLINIC | Age: 49
End: 2024-12-26

## 2024-12-26 NOTE — TELEPHONE ENCOUNTER
Patient called in wanting a prescription for asthma sent to [facility] if possible, please call patient as he would like to speak to a nurse regarding this.

## 2024-12-28 RX ORDER — ALBUTEROL SULFATE 90 UG/1
2 INHALANT RESPIRATORY (INHALATION) 4 TIMES DAILY PRN
Qty: 18 G | Refills: 1 | Status: SHIPPED | OUTPATIENT
Start: 2024-12-28

## 2024-12-30 NOTE — TELEPHONE ENCOUNTER
Called and left a detailed VM letting the patient know that his albuterol inhaler has been sent to the pharmacy. I also let him know that he needs to call and schedule a follow up visit with Dr. La.

## 2025-01-13 ENCOUNTER — HOSPITAL ENCOUNTER (EMERGENCY)
Facility: HOSPITAL | Age: 50
Discharge: HOME | End: 2025-01-13
Attending: STUDENT IN AN ORGANIZED HEALTH CARE EDUCATION/TRAINING PROGRAM
Payer: MEDICARE

## 2025-01-13 ENCOUNTER — APPOINTMENT (OUTPATIENT)
Facility: HOSPITAL | Age: 50
End: 2025-01-13
Payer: MEDICARE

## 2025-01-13 VITALS
HEIGHT: 67 IN | TEMPERATURE: 98.1 F | HEART RATE: 84 BPM | DIASTOLIC BLOOD PRESSURE: 80 MMHG | OXYGEN SATURATION: 95 % | WEIGHT: 240 LBS | SYSTOLIC BLOOD PRESSURE: 117 MMHG | BODY MASS INDEX: 37.67 KG/M2 | RESPIRATION RATE: 21 BRPM

## 2025-01-13 DIAGNOSIS — R07.9 CHEST PAIN DUE TO GERD: Primary | ICD-10-CM

## 2025-01-13 DIAGNOSIS — K21.9 CHEST PAIN DUE TO GERD: Primary | ICD-10-CM

## 2025-01-13 LAB
ALBUMIN SERPL-MCNC: 4.2 G/DL (ref 3.5–5)
ALBUMIN/GLOB SERPL: 1.4 (ref 1.1–2.2)
ALP SERPL-CCNC: 89 U/L (ref 45–117)
ALT SERPL-CCNC: 25 U/L (ref 12–78)
ANION GAP SERPL CALC-SCNC: 6 MMOL/L (ref 2–12)
AST SERPL W P-5'-P-CCNC: 15 U/L (ref 15–37)
BASOPHILS # BLD: 0.06 K/UL (ref 0–0.1)
BASOPHILS NFR BLD: 0.7 % (ref 0–1)
BILIRUB SERPL-MCNC: 0.5 MG/DL (ref 0.2–1)
BUN SERPL-MCNC: 10 MG/DL (ref 6–20)
BUN/CREAT SERPL: 13 (ref 12–20)
CA-I BLD-MCNC: 9.2 MG/DL (ref 8.5–10.1)
CHLORIDE SERPL-SCNC: 107 MMOL/L (ref 97–108)
CO2 SERPL-SCNC: 26 MMOL/L (ref 21–32)
CREAT SERPL-MCNC: 0.8 MG/DL (ref 0.7–1.3)
DIFFERENTIAL METHOD BLD: NORMAL
EKG ATRIAL RATE: 81 BPM
EKG DIAGNOSIS: NORMAL
EKG P AXIS: 12 DEGREES
EKG P-R INTERVAL: 162 MS
EKG Q-T INTERVAL: 348 MS
EKG QRS DURATION: 80 MS
EKG QTC CALCULATION (BAZETT): 404 MS
EKG R AXIS: 1 DEGREES
EKG T AXIS: 16 DEGREES
EKG VENTRICULAR RATE: 81 BPM
EOSINOPHIL # BLD: 0.29 K/UL (ref 0–0.4)
EOSINOPHIL NFR BLD: 3.3 % (ref 0–7)
ERYTHROCYTE [DISTWIDTH] IN BLOOD BY AUTOMATED COUNT: 12.4 % (ref 11.5–14.5)
GLOBULIN SER CALC-MCNC: 3.1 G/DL (ref 2–4)
GLUCOSE SERPL-MCNC: 98 MG/DL (ref 65–100)
HCT VFR BLD AUTO: 44.6 % (ref 36.6–50.3)
HGB BLD-MCNC: 16.3 G/DL (ref 12.1–17)
IMM GRANULOCYTES # BLD AUTO: 0.02 K/UL (ref 0–0.04)
IMM GRANULOCYTES NFR BLD AUTO: 0.2 % (ref 0–0.5)
LYMPHOCYTES # BLD: 2.81 K/UL (ref 0.8–3.5)
LYMPHOCYTES NFR BLD: 32.4 % (ref 12–49)
MCH RBC QN AUTO: 30.7 PG (ref 26–34)
MCHC RBC AUTO-ENTMCNC: 36.5 G/DL (ref 30–36.5)
MCV RBC AUTO: 84 FL (ref 80–99)
MONOCYTES # BLD: 0.84 K/UL (ref 0–1)
MONOCYTES NFR BLD: 9.7 % (ref 5–13)
NEUTS SEG # BLD: 4.64 K/UL (ref 1.8–8)
NEUTS SEG NFR BLD: 53.7 % (ref 32–75)
NRBC # BLD: 0 K/UL (ref 0–0.01)
NRBC BLD-RTO: 0 PER 100 WBC
PLATELET # BLD AUTO: 334 K/UL (ref 150–400)
PMV BLD AUTO: 9.2 FL (ref 8.9–12.9)
POTASSIUM SERPL-SCNC: 4.1 MMOL/L (ref 3.5–5.1)
PROT SERPL-MCNC: 7.3 G/DL (ref 6.4–8.2)
RBC # BLD AUTO: 5.31 M/UL (ref 4.1–5.7)
SODIUM SERPL-SCNC: 139 MMOL/L (ref 136–145)
TROPONIN I SERPL HS-MCNC: 8 NG/L (ref 0–76)
TROPONIN I SERPL HS-MCNC: 8 NG/L (ref 0–76)
WBC # BLD AUTO: 8.7 K/UL (ref 4.1–11.1)

## 2025-01-13 PROCEDURE — 93005 ELECTROCARDIOGRAM TRACING: CPT | Performed by: EMERGENCY MEDICINE

## 2025-01-13 PROCEDURE — 71045 X-RAY EXAM CHEST 1 VIEW: CPT

## 2025-01-13 PROCEDURE — 99285 EMERGENCY DEPT VISIT HI MDM: CPT

## 2025-01-13 PROCEDURE — 80053 COMPREHEN METABOLIC PANEL: CPT

## 2025-01-13 PROCEDURE — 85025 COMPLETE CBC W/AUTO DIFF WBC: CPT

## 2025-01-13 PROCEDURE — 6370000000 HC RX 637 (ALT 250 FOR IP): Performed by: STUDENT IN AN ORGANIZED HEALTH CARE EDUCATION/TRAINING PROGRAM

## 2025-01-13 PROCEDURE — 84484 ASSAY OF TROPONIN QUANT: CPT

## 2025-01-13 RX ORDER — OMEPRAZOLE 40 MG/1
40 CAPSULE, DELAYED RELEASE ORAL
Qty: 30 CAPSULE | Refills: 1 | Status: SHIPPED | OUTPATIENT
Start: 2025-01-13 | End: 2025-03-14

## 2025-01-13 RX ADMIN — LIDOCAINE HYDROCHLORIDE 40 ML: 20 SOLUTION ORAL at 18:31

## 2025-01-13 ASSESSMENT — PAIN - FUNCTIONAL ASSESSMENT
PAIN_FUNCTIONAL_ASSESSMENT: 0-10
PAIN_FUNCTIONAL_ASSESSMENT: NONE - DENIES PAIN

## 2025-01-13 ASSESSMENT — PAIN DESCRIPTION - PAIN TYPE: TYPE: ACUTE PAIN

## 2025-01-13 ASSESSMENT — PAIN SCALES - GENERAL: PAINLEVEL_OUTOF10: 9

## 2025-01-13 NOTE — ED PROVIDER NOTES
Shortness Of Breath       PCP: Lor Hood MD    Current Meds:   No current facility-administered medications for this encounter.     Current Outpatient Medications   Medication Sig Dispense Refill    albuterol sulfate HFA (VENTOLIN HFA) 108 (90 Base) MCG/ACT inhaler Inhale 2 puffs into the lungs 4 times daily as needed for Wheezing 18 g 1    dicyclomine (BENTYL) 20 MG tablet Take 1 tablet by mouth 4 times daily 30 tablet 0    ondansetron (ZOFRAN) 4 MG tablet Take 1 tablet by mouth 3 times daily as needed for Nausea or Vomiting 15 tablet 0    ibuprofen (IBU) 600 MG tablet Take 1 tablet by mouth 2 times daily as needed for Pain Do not take on empty stomach, daily use can cause increased risk for stomach ulcer and kidney failure. 10 tablet 0    ketorolac (TORADOL) 10 MG tablet Take 1 tablet by mouth every 6 hours as needed for Pain 20 tablet 0    metoprolol succinate (TOPROL XL) 25 MG extended release tablet TAKE 1 TABLET EVERY DAY 60 tablet 5    vitamin D (ERGOCALCIFEROL) 1.25 MG (98418 UT) CAPS capsule Take 1 capsule by mouth once a week 12 capsule 1    Semaglutide-Weight Management (WEGOVY) 0.25 MG/0.5ML SOAJ SC injection Inject 0.25 mg into the skin every 7 days For 4 weeks then increase to 0.5 mg once a week 2 mL 0    celecoxib (CELEBREX) 200 MG capsule Take 1 capsule by mouth 1 (one) time if needed for Pain Take with food,take sparingly. 30 capsule 0    omeprazole (PRILOSEC) 40 MG delayed release capsule TAKE 1 CAPSULE BY MOUTH DAILY. TAKE 30 MINUTES BEFORE EATING ONCE A DAY INDICATIONS: STOMACH ULCER FROM ASPIRIN/IBUPROFEN-LIKE DRUGS PREVENTION 30 capsule 4       Social Determinants of Health:   Social Determinants of Health     Tobacco Use: Low Risk  (10/7/2024)    Patient History     Smoking Tobacco Use: Never     Smokeless Tobacco Use: Never     Passive Exposure: Not on file   Alcohol Use: Not At Risk (11/17/2024)    AUDIT-C     Frequency of Alcohol Consumption: Never     Average Number of Drinks:

## 2025-01-13 NOTE — ED TRIAGE NOTES
[facility] 15 pt stated that he has been having intermittent CP for the last 2 weeks with to without activity, worsens after he eats; Hx HTN pt stated that he takes his meds as prescribed

## 2025-01-14 NOTE — DISCHARGE INSTRUCTIONS
medical, dental, behavioral health, pharmacy  Accepts most insurances, including Medicare, Medicaid, HMOs  Hours:  Health center: Monday to Friday 8:00am - 5:00pm (open until 6:30pm on Thurs)  Dental: Monday to Friday 7:30am - 3:30pm  Pharmacy: Mon, Tue, Wed, Fri 8:30am - 5:00pm; Thurs 9:30am - 6:00pm  Location: [address] [place] [address]  [phone] (fax)  _______________________________________________________________________   [facility]  Services: medical addiction treatment  Accepts most insurances, including Medicare, Medicaid, HMOs  Hours:  Monday: 8:30am - 5:00pm  Tuesday: 7:00am - 5:00pm  Wednesday: 8:30am - 5:00pm  Thursday: 8:30am - 7:00pm  [phone] (fax)  _______________________________________________________________________   [facility]  Services: medical, dental, wellness workshops, behavioral health, addiction services  By appointment:  Cardiology  Rheumatology  [facility] Chronic Care  [facility] Mental Health  Women's Health / GYN  Mammograms and Mammogram Classes  Blood Pressure Screenings  General Health Screenings  Seasonal Flu Shots  Location: [address] [place] [address]  [phone]  _______________________________________________________________________   [facility]  Services: primary care, specialty care, pediatrics, HIV/STD testing, women's health, mental health, dental care, eye care, pharmacy case management  Location: [address] [place] [address]  Clinic phone: [phone]  Pharmacy line: [phone]  Phone lines are available Mon-Thurs 8:00 a.m.-5:00 p.m and Fridays 8:00 a.m.-12:30 p.m.  Clinic Hours  Monday-Thursday: 8:30 a.m.-5:00 p.m.  Friday: 8:30 a.m.-12:00 p.m.  [facility] Department  Call for assistance with social  yes

## 2025-04-28 RX ORDER — METOPROLOL SUCCINATE 25 MG/1
25 TABLET, EXTENDED RELEASE ORAL DAILY
Qty: 90 TABLET | Refills: 1 | Status: SHIPPED | OUTPATIENT
Start: 2025-04-28

## 2025-04-28 NOTE — TELEPHONE ENCOUNTER
Patient is requesting a refill for       metoprolol succinate (TOPROL XL) 25 MG extended release tablet       Send to [facility] Delivery

## 2025-05-17 PROBLEM — M17.11 PRIMARY OSTEOARTHRITIS OF RIGHT KNEE: Status: RESOLVED | Noted: 2023-04-19 | Resolved: 2025-05-17

## 2025-05-17 PROBLEM — Z12.11 SCREEN FOR COLON CANCER: Status: ACTIVE | Noted: 2025-05-17

## 2025-06-16 PROBLEM — Z12.11 SCREEN FOR COLON CANCER: Status: RESOLVED | Noted: 2025-05-17 | Resolved: 2025-06-16

## 2025-06-23 ENCOUNTER — HOSPITAL ENCOUNTER (INPATIENT)
Facility: HOSPITAL | Age: 50
LOS: 2 days | Discharge: HOME | DRG: 357 | End: 2025-06-27
Attending: STUDENT IN AN ORGANIZED HEALTH CARE EDUCATION/TRAINING PROGRAM
Payer: COMMERCIAL

## 2025-06-23 ENCOUNTER — APPOINTMENT (OUTPATIENT)
Facility: HOSPITAL | Age: 50
DRG: 357 | End: 2025-06-23
Payer: COMMERCIAL

## 2025-06-23 DIAGNOSIS — K92.2 LOWER GI BLEED: Primary | ICD-10-CM

## 2025-06-23 DIAGNOSIS — K62.5 RECTAL BLEEDING: ICD-10-CM

## 2025-06-23 LAB
ALBUMIN SERPL-MCNC: 3.7 G/DL (ref 3.5–5)
ALBUMIN/GLOB SERPL: 1.2 (ref 1.1–2.2)
ALP SERPL-CCNC: 88 U/L (ref 45–117)
ALT SERPL-CCNC: 25 U/L (ref 12–78)
ANION GAP SERPL CALC-SCNC: 7 MMOL/L (ref 2–12)
APTT PPP: 29.1 SEC (ref 21.2–34.1)
AST SERPL W P-5'-P-CCNC: 13 U/L (ref 15–37)
BASOPHILS # BLD: 0.05 K/UL (ref 0–0.1)
BASOPHILS NFR BLD: 0.6 % (ref 0–1)
BILIRUB SERPL-MCNC: 0.3 MG/DL (ref 0.2–1)
BUN SERPL-MCNC: 12 MG/DL (ref 6–20)
BUN/CREAT SERPL: 14 (ref 12–20)
CA-I BLD-MCNC: 9.4 MG/DL (ref 8.5–10.1)
CHLORIDE SERPL-SCNC: 106 MMOL/L (ref 97–108)
CO2 SERPL-SCNC: 27 MMOL/L (ref 21–32)
CREAT SERPL-MCNC: 0.85 MG/DL (ref 0.7–1.3)
DIFFERENTIAL METHOD BLD: NORMAL
EOSINOPHIL # BLD: 0.19 K/UL (ref 0–0.4)
EOSINOPHIL NFR BLD: 2.2 % (ref 0–7)
ERYTHROCYTE [DISTWIDTH] IN BLOOD BY AUTOMATED COUNT: 12.7 % (ref 11.5–14.5)
GLOBULIN SER CALC-MCNC: 3 G/DL (ref 2–4)
GLUCOSE SERPL-MCNC: 125 MG/DL (ref 65–100)
HCT VFR BLD AUTO: 42.4 % (ref 36.6–50.3)
HGB BLD-MCNC: 15.1 G/DL (ref 12.1–17)
IMM GRANULOCYTES # BLD AUTO: 0.03 K/UL (ref 0–0.04)
IMM GRANULOCYTES NFR BLD AUTO: 0.3 % (ref 0–0.5)
INR PPP: 1 (ref 0.9–1.1)
LYMPHOCYTES # BLD: 1.95 K/UL (ref 0.8–3.5)
LYMPHOCYTES NFR BLD: 22.6 % (ref 12–49)
MCH RBC QN AUTO: 28.9 PG (ref 26–34)
MCHC RBC AUTO-ENTMCNC: 35.6 G/DL (ref 30–36.5)
MCV RBC AUTO: 81.2 FL (ref 80–99)
MONOCYTES # BLD: 0.75 K/UL (ref 0–1)
MONOCYTES NFR BLD: 8.7 % (ref 5–13)
NEUTS SEG # BLD: 5.67 K/UL (ref 1.8–8)
NEUTS SEG NFR BLD: 65.6 % (ref 32–75)
NRBC # BLD: 0 K/UL (ref 0–0.01)
NRBC BLD-RTO: 0 PER 100 WBC
PLATELET # BLD AUTO: 334 K/UL (ref 150–400)
PMV BLD AUTO: 9.1 FL (ref 8.9–12.9)
POTASSIUM SERPL-SCNC: 4.2 MMOL/L (ref 3.5–5.1)
PROT SERPL-MCNC: 6.7 G/DL (ref 6.4–8.2)
PROTHROMBIN TIME: 13.4 SEC (ref 11.9–14.6)
PSA SERPL-MCNC: 0.7 NG/ML (ref 0.01–4)
RBC # BLD AUTO: 5.22 M/UL (ref 4.1–5.7)
SODIUM SERPL-SCNC: 140 MMOL/L (ref 136–145)
THERAPEUTIC RANGE: NORMAL SEC (ref 82–109)
WBC # BLD AUTO: 8.6 K/UL (ref 4.1–11.1)

## 2025-06-23 PROCEDURE — G0378 HOSPITAL OBSERVATION PER HR: HCPCS

## 2025-06-23 PROCEDURE — 99285 EMERGENCY DEPT VISIT HI MDM: CPT

## 2025-06-23 PROCEDURE — 80053 COMPREHEN METABOLIC PANEL: CPT

## 2025-06-23 PROCEDURE — 85730 THROMBOPLASTIN TIME PARTIAL: CPT

## 2025-06-23 PROCEDURE — 6360000002 HC RX W HCPCS: Performed by: STUDENT IN AN ORGANIZED HEALTH CARE EDUCATION/TRAINING PROGRAM

## 2025-06-23 PROCEDURE — 6360000004 HC RX CONTRAST MEDICATION: Performed by: STUDENT IN AN ORGANIZED HEALTH CARE EDUCATION/TRAINING PROGRAM

## 2025-06-23 PROCEDURE — 96375 TX/PRO/DX INJ NEW DRUG ADDON: CPT

## 2025-06-23 PROCEDURE — G0103 PSA SCREENING: HCPCS

## 2025-06-23 PROCEDURE — 6360000002 HC RX W HCPCS

## 2025-06-23 PROCEDURE — 85025 COMPLETE CBC W/AUTO DIFF WBC: CPT

## 2025-06-23 PROCEDURE — 74174 CTA ABD&PLVS W/CONTRAST: CPT

## 2025-06-23 PROCEDURE — 96374 THER/PROPH/DIAG INJ IV PUSH: CPT

## 2025-06-23 PROCEDURE — 2500000003 HC RX 250 WO HCPCS

## 2025-06-23 PROCEDURE — 6370000000 HC RX 637 (ALT 250 FOR IP): Performed by: INTERNAL MEDICINE

## 2025-06-23 PROCEDURE — 85610 PROTHROMBIN TIME: CPT

## 2025-06-23 RX ORDER — SODIUM CHLORIDE 0.9 % (FLUSH) 0.9 %
5-40 SYRINGE (ML) INJECTION EVERY 12 HOURS SCHEDULED
Status: DISCONTINUED | OUTPATIENT
Start: 2025-06-23 | End: 2025-06-27 | Stop reason: HOSPADM

## 2025-06-23 RX ORDER — IOPAMIDOL 755 MG/ML
100 INJECTION, SOLUTION INTRAVASCULAR
Status: COMPLETED | OUTPATIENT
Start: 2025-06-23 | End: 2025-06-23

## 2025-06-23 RX ORDER — TAMSULOSIN HYDROCHLORIDE 0.4 MG/1
0.4 CAPSULE ORAL DAILY
Status: DISCONTINUED | OUTPATIENT
Start: 2025-06-23 | End: 2025-06-27 | Stop reason: HOSPADM

## 2025-06-23 RX ORDER — ACETAMINOPHEN 650 MG/1
650 SUPPOSITORY RECTAL EVERY 6 HOURS PRN
Status: DISCONTINUED | OUTPATIENT
Start: 2025-06-23 | End: 2025-06-27 | Stop reason: HOSPADM

## 2025-06-23 RX ORDER — SODIUM CHLORIDE 9 MG/ML
INJECTION, SOLUTION INTRAVENOUS PRN
Status: DISCONTINUED | OUTPATIENT
Start: 2025-06-23 | End: 2025-06-27 | Stop reason: HOSPADM

## 2025-06-23 RX ORDER — SODIUM CHLORIDE 0.9 % (FLUSH) 0.9 %
5-40 SYRINGE (ML) INJECTION PRN
Status: DISCONTINUED | OUTPATIENT
Start: 2025-06-23 | End: 2025-06-27 | Stop reason: HOSPADM

## 2025-06-23 RX ORDER — PANTOPRAZOLE SODIUM 40 MG/10ML
40 INJECTION, POWDER, LYOPHILIZED, FOR SOLUTION INTRAVENOUS ONCE
Status: COMPLETED | OUTPATIENT
Start: 2025-06-23 | End: 2025-06-23

## 2025-06-23 RX ORDER — METOPROLOL SUCCINATE 25 MG/1
25 TABLET, EXTENDED RELEASE ORAL DAILY
Qty: 100 TABLET | Refills: 2 | OUTPATIENT
Start: 2025-06-23

## 2025-06-23 RX ORDER — ACETAMINOPHEN 325 MG/1
650 TABLET ORAL EVERY 6 HOURS PRN
Status: DISCONTINUED | OUTPATIENT
Start: 2025-06-23 | End: 2025-06-27 | Stop reason: HOSPADM

## 2025-06-23 RX ORDER — ONDANSETRON 2 MG/ML
4 INJECTION INTRAMUSCULAR; INTRAVENOUS EVERY 6 HOURS PRN
Status: DISCONTINUED | OUTPATIENT
Start: 2025-06-23 | End: 2025-06-27 | Stop reason: HOSPADM

## 2025-06-23 RX ORDER — POLYETHYLENE GLYCOL 3350 17 G/17G
17 POWDER, FOR SOLUTION ORAL DAILY PRN
Status: DISCONTINUED | OUTPATIENT
Start: 2025-06-23 | End: 2025-06-27 | Stop reason: HOSPADM

## 2025-06-23 RX ORDER — POTASSIUM CHLORIDE 7.45 MG/ML
10 INJECTION INTRAVENOUS PRN
Status: DISCONTINUED | OUTPATIENT
Start: 2025-06-23 | End: 2025-06-27 | Stop reason: HOSPADM

## 2025-06-23 RX ORDER — METOPROLOL SUCCINATE 25 MG/1
25 TABLET, EXTENDED RELEASE ORAL DAILY
Status: DISCONTINUED | OUTPATIENT
Start: 2025-06-23 | End: 2025-06-27 | Stop reason: HOSPADM

## 2025-06-23 RX ORDER — POTASSIUM CHLORIDE 1500 MG/1
40 TABLET, EXTENDED RELEASE ORAL PRN
Status: DISCONTINUED | OUTPATIENT
Start: 2025-06-23 | End: 2025-06-27 | Stop reason: HOSPADM

## 2025-06-23 RX ORDER — MAGNESIUM SULFATE IN WATER 40 MG/ML
2000 INJECTION, SOLUTION INTRAVENOUS PRN
Status: DISCONTINUED | OUTPATIENT
Start: 2025-06-23 | End: 2025-06-27 | Stop reason: HOSPADM

## 2025-06-23 RX ORDER — ONDANSETRON 4 MG/1
4 TABLET, ORALLY DISINTEGRATING ORAL EVERY 8 HOURS PRN
Status: DISCONTINUED | OUTPATIENT
Start: 2025-06-23 | End: 2025-06-27 | Stop reason: HOSPADM

## 2025-06-23 RX ADMIN — ONDANSETRON 4 MG: 2 INJECTION, SOLUTION INTRAMUSCULAR; INTRAVENOUS at 21:40

## 2025-06-23 RX ADMIN — PANTOPRAZOLE SODIUM 40 MG: 40 INJECTION, POWDER, FOR SOLUTION INTRAVENOUS at 16:28

## 2025-06-23 RX ADMIN — SODIUM CHLORIDE, PRESERVATIVE FREE 10 ML: 5 INJECTION INTRAVENOUS at 20:32

## 2025-06-23 RX ADMIN — IOPAMIDOL 100 ML: 755 INJECTION, SOLUTION INTRAVENOUS at 15:13

## 2025-06-23 RX ADMIN — POLYETHYLENE GLYCOL-3350 AND ELECTROLYTES 4000 ML: 236; 6.74; 5.86; 2.97; 22.74 POWDER, FOR SOLUTION ORAL at 20:33

## 2025-06-23 RX ADMIN — SODIUM CHLORIDE, PRESERVATIVE FREE 10 ML: 5 INJECTION INTRAVENOUS at 21:41

## 2025-06-23 ASSESSMENT — PAIN DESCRIPTION - LOCATION
LOCATION: RECTUM
LOCATION: RECTUM

## 2025-06-23 ASSESSMENT — PAIN SCALES - GENERAL
PAINLEVEL_OUTOF10: 5
PAINLEVEL_OUTOF10: 5

## 2025-06-23 ASSESSMENT — PAIN - FUNCTIONAL ASSESSMENT: PAIN_FUNCTIONAL_ASSESSMENT: 0-10

## 2025-06-23 NOTE — ED NOTES
ED TO INPATIENT SBAR HANDOFF    Patient Name: Kirby Andersen   Preferred Name: Kirby  : 1975  50 y.o.   Family/Caregiver Present: no   Code Status Order: Full Code  PO Status: NPO:No  Telemetry Order: No  C-SSRS: Risk of Suicide: No Risk  Sitter no   Restraints:     Sepsis Risk Score Sepsis V2 Risk Score: 7.1    Situation  Chief Complaint   Patient presents with    Rectal Bleeding     Brief Description of Patient's Condition: C/o rectal bleeding x 3 months that worsened this morning     Mental Status: oriented, alert, coherent, logical, thought processes intact, and able to concentrate and follow conversation  Arrived from:Home  Imaging:   CTA ABDOMEN PELVIS W WO CONTRAST   Final Result      1. No acute findings in the abdomen or pelvis.   2. Mild colonic diverticulosis without evidence of diverticulitis, no active   bleeding.         Electronically signed by Rafy Mullen        Abnormal labs:   Abnormal Labs Reviewed   COMPREHENSIVE METABOLIC PANEL - Abnormal; Notable for the following components:       Result Value    Glucose 125 (*)     AST 13 (*)     All other components within normal limits       Background  Allergies:   Allergies   Allergen Reactions    Cephalexin Anaphylaxis and Shortness Of Breath     History:   Past Medical History:   Diagnosis Date    Hand cramps 10/10/2020    Hypercholesterolemia     Hypertension     Impotence 10/10/2020    Increased frequency of urination 10/10/2020    Leukocytosis 10/10/2020    Migraines     Mild intermittent asthma without complication 2021    Morbid obesity with BMI of 50.0-59.9, adult (HCC)     Multiple acquired skin tags 10/10/2020    Osteoarthritis of right knee, unspecified osteoarthritis type 2023    Pain in both hands 10/10/2020    Paresthesia of left upper limb 10/10/2020    Seasonal allergic rhinitis 10/10/2020    Vitamin D deficiency 10/10/2020       Assessment  Vitals: MEWS Score: 1  Level of Consciousness: Alert (0)   Vitals:

## 2025-06-23 NOTE — H&P
Hospitalist Admission Note    NAME:   Kirby Andersen   : 1975   MRN: [id number]     Date/Time: 2025 4:22 PM    Patient PCP: Lor Hood MD    ______________________________________________________________________  Given the patient's current clinical presentation, I have a high level of concern for decompensation if discharged from the emergency department.  Complex decision making was performed, which includes reviewing the patient's available past medical records, laboratory results, and x-ray films.       My assessment of this patient's clinical condition and my plan of care is as follows.    Assessment / Plan:      Lower GI bleeding  Likely from diverticula or hemorrhoids  Hb 15 stable  CTA negative for active bleeding  Continue protonix  Consulted GI, needs colonoscopy      Dysuria, poly nocturia  Get PSA  Likely BPH  Needs urology referral outpatient  Start flomax  UA with reflex    HTN  metoprolol      Medical Decision Making:   I personally reviewed labs: cbc, bmp  I personally reviewed imaging:CTA negative for active bleeding  I personally reviewed EKG: ordered  Toxic drug monitoring: -  Discussed case with: ED provider. After discussion I am in agreement that acuity of patient's medical condition necessitates hospital stay.      Code Status: full  DVT Prophylaxis: SCD    Subjective:   CHIEF COMPLAINT: lower GI bleeding    HISTORY OF PRESENT ILLNESS:     Kirby Andersen is a 50 y.o.  male with PMHx of HTN presented  c/o 3 months of bleeding with each bowel movement, progressing,  now with blood cloths. Never had colonoscopy before, went to PCP but long wait for a procedure.   Hb stable, BP stable on admission  Pt also mentions dysuria with hesitancy to start urination and weak flow along with polynocturia    Past Medical History:   Diagnosis Date    Hand cramps 10/10/2020    Hypercholesterolemia     Hypertension     Impotence 10/10/2020    Increased frequency of urination

## 2025-06-23 NOTE — PROGRESS NOTES
4 Eyes Skin Assessment     NAME:  Kirby Andersen  YOB: 1975  MEDICAL RECORD NUMBER:  [id number]    The patient is being assessed for  Admission    I agree that at least one RN has performed a thorough Head to Toe Skin Assessment on the patient. ALL assessment sites listed below have been assessed.      Areas assessed by both nurses:    Head, Face, Ears, Shoulders, Back, Chest, Arms, Elbows, Hands, Sacrum. Buttock, Coccyx, Ischium, Legs. Feet and Heels, and Under Medical Devices         Does the Patient have a Wound? No noted wound(s)       Deric Prevention initiated by RN: No  Wound Care Orders initiated by RN: No    Pressure Injury (Stage 3,4, Unstageable, DTI, NWPT, and Complex wounds) if present, place Wound referral order by RN under : No    New Ostomies, if present place, Ostomy referral order under : No     Nurse 1 eSignature: Electronically signed by Charline Sumner RN on 6/23/25 at 5:45 PM EDT    **SHARE this note so that the co-signing nurse can place an eSignature**    Nurse 2 eSignature: Electronically signed by Igor Scott RN on 6/23/25 at 5:46 PM EDT

## 2025-06-23 NOTE — CARE COORDINATION
06/23/25 2805   Service Assessment   Patient Orientation Alert and Oriented   Cognition Alert   History Provided By Patient   Primary Caregiver Self   Accompanied By/Relationship Pt alone.   Support Systems Spouse/Significant Other   Patient's Healthcare Decision Maker is: Legal Next of Kin   PCP Verified by CM Yes  (ArnulfowenLor - seen 6 mos ago.)   Last Visit to PCP Within last 6 months   Prior Functional Level Independent in ADLs/IADLs;Assistance with the following:;Mobility  (Cane PRN)   Current Functional Level Independent in ADLs/IADLs;Mobility;Assistance with the following:  (Cane PRN)   Can patient return to prior living arrangement Yes   Ability to make needs known: Good   Family able to assist with home care needs: Yes   Would you like for me to discuss the discharge plan with any other family members/significant others, and if so, who? Yes   Financial Resources Medicare   Community Resources None   CM/SW Referral Other (see comment)  (None)   Social/Functional History   Lives With Spouse   Type of Home Apartment   Home Layout Two level   Home Access Stairs to enter with rails   Entrance Stairs - Number of Steps 2nd Floor   Bathroom Shower/Tub Tub/Shower unit   Bathroom Toilet Standard   Bathroom Equipment None   Bathroom Accessibility Accessible   Home Equipment Cane  (PRN)   Receives Help From Family   Prior Level of Assist for ADLs Independent   Prior Level of Assist for Homemaking Independent   Homemaking Responsibilities Yes   Ambulation Assistance Independent   Prior Level of Assist for Transfers Independent   Active  No   Occupation Part time employment   Discharge Planning   Type of Residence Apartment   Living Arrangements Spouse/Significant Other   Current Services Prior To Admission None   Potential Assistance Needed N/A   DME Ordered? No   Potential Assistance Purchasing Medications No   Type of Home Care Services None   Patient expects to be discharged to: Apartment   One/Two Story

## 2025-06-23 NOTE — CONSULTS
Gastroenterology Consult     Referring Physician: Lor Hood MD     Consult Date: 6/23/2025     Subjective:     Chief Complaint: Rectal bleeding    History of Present Illness: Kirby Andersen is a 50 y.o. male who is seen in consultation for rectal bleeding.  Patient was admitted to the hospital with rectal bleeding patient's symptoms started 3 months ago with intermittent rectal bleeding however the for the past 3 days he has persistent bleeding from the rectum he denies any abdominal pain nausea or vomiting.  He does not have any fever or chills.  There is no family history of colon cancer he does not take any blood thinners a consult was placed to evaluate for rectal bleeding.  Patient's hemoglobin is pretty normal.  Past medical history as listed below which includes hyperlipidemia and hypertension.    A narrative of his history is as follows     Past Medical History:   Diagnosis Date    Hand cramps 10/10/2020    Hypercholesterolemia     Hypertension     Impotence 10/10/2020    Increased frequency of urination 10/10/2020    Leukocytosis 10/10/2020    Migraines     Mild intermittent asthma without complication 01/18/2021    Morbid obesity with BMI of 50.0-59.9, adult (HCC)     Multiple acquired skin tags 10/10/2020    Osteoarthritis of right knee, unspecified osteoarthritis type 4/19/2023    Pain in both hands 10/10/2020    Paresthesia of left upper limb 10/10/2020    Seasonal allergic rhinitis 10/10/2020    Vitamin D deficiency 10/10/2020     Past Surgical History:   Procedure Laterality Date    APPENDECTOMY  1993    PROSTATE SURGERY      UROLOGICAL SURGERY        Family History   Adopted: Yes     Social History     Tobacco Use    Smoking status: Never    Smokeless tobacco: Never   Substance Use Topics    Alcohol use: No      Allergies   Allergen Reactions    Cephalexin Anaphylaxis and Shortness Of Breath     Current Facility-Administered Medications   Medication Dose Route Frequency

## 2025-06-24 ENCOUNTER — APPOINTMENT (OUTPATIENT)
Facility: HOSPITAL | Age: 50
DRG: 357 | End: 2025-06-24
Payer: COMMERCIAL

## 2025-06-24 ENCOUNTER — ANESTHESIA (OUTPATIENT)
Facility: HOSPITAL | Age: 50
End: 2025-06-24
Payer: MEDICARE

## 2025-06-24 ENCOUNTER — ANESTHESIA EVENT (OUTPATIENT)
Facility: HOSPITAL | Age: 50
End: 2025-06-24
Payer: MEDICARE

## 2025-06-24 LAB
CEA SERPL-MCNC: 1 NG/ML
ERYTHROCYTE [DISTWIDTH] IN BLOOD BY AUTOMATED COUNT: 12.6 % (ref 11.5–14.5)
HCT VFR BLD AUTO: 40.8 % (ref 36.6–50.3)
HGB BLD-MCNC: 14.5 G/DL (ref 12.1–17)
MCH RBC QN AUTO: 29.1 PG (ref 26–34)
MCHC RBC AUTO-ENTMCNC: 35.5 G/DL (ref 30–36.5)
MCV RBC AUTO: 81.8 FL (ref 80–99)
NRBC # BLD: 0 K/UL (ref 0–0.01)
NRBC BLD-RTO: 0 PER 100 WBC
PLATELET # BLD AUTO: 295 K/UL (ref 150–400)
PMV BLD AUTO: 9.4 FL (ref 8.9–12.9)
RBC # BLD AUTO: 4.99 M/UL (ref 4.1–5.7)
WBC # BLD AUTO: 7.5 K/UL (ref 4.1–11.1)

## 2025-06-24 PROCEDURE — 7100000010 HC PHASE II RECOVERY - FIRST 15 MIN: Performed by: INTERNAL MEDICINE

## 2025-06-24 PROCEDURE — 36415 COLL VENOUS BLD VENIPUNCTURE: CPT

## 2025-06-24 PROCEDURE — 3600007502: Performed by: INTERNAL MEDICINE

## 2025-06-24 PROCEDURE — 82378 CARCINOEMBRYONIC ANTIGEN: CPT

## 2025-06-24 PROCEDURE — 88360 TUMOR IMMUNOHISTOCHEM/MANUAL: CPT

## 2025-06-24 PROCEDURE — 2580000003 HC RX 258: Performed by: NURSE ANESTHETIST, CERTIFIED REGISTERED

## 2025-06-24 PROCEDURE — G0378 HOSPITAL OBSERVATION PER HR: HCPCS

## 2025-06-24 PROCEDURE — 7100000011 HC PHASE II RECOVERY - ADDTL 15 MIN: Performed by: INTERNAL MEDICINE

## 2025-06-24 PROCEDURE — 85027 COMPLETE CBC AUTOMATED: CPT

## 2025-06-24 PROCEDURE — 6360000004 HC RX CONTRAST MEDICATION: Performed by: INTERNAL MEDICINE

## 2025-06-24 PROCEDURE — 6370000000 HC RX 637 (ALT 250 FOR IP)

## 2025-06-24 PROCEDURE — 6360000002 HC RX W HCPCS: Performed by: NURSE ANESTHETIST, CERTIFIED REGISTERED

## 2025-06-24 PROCEDURE — 2500000003 HC RX 250 WO HCPCS

## 2025-06-24 PROCEDURE — 0DBN8ZX EXCISION OF SIGMOID COLON, VIA NATURAL OR ARTIFICIAL OPENING ENDOSCOPIC, DIAGNOSTIC: ICD-10-PCS | Performed by: INTERNAL MEDICINE

## 2025-06-24 PROCEDURE — 3700000001 HC ADD 15 MINUTES (ANESTHESIA): Performed by: INTERNAL MEDICINE

## 2025-06-24 PROCEDURE — 3700000000 HC ANESTHESIA ATTENDED CARE: Performed by: INTERNAL MEDICINE

## 2025-06-24 PROCEDURE — 3600007512: Performed by: INTERNAL MEDICINE

## 2025-06-24 PROCEDURE — 2709999900 HC NON-CHARGEABLE SUPPLY: Performed by: INTERNAL MEDICINE

## 2025-06-24 PROCEDURE — 88305 TISSUE EXAM BY PATHOLOGIST: CPT

## 2025-06-24 PROCEDURE — 2500000003 HC RX 250 WO HCPCS: Performed by: NURSE ANESTHETIST, CERTIFIED REGISTERED

## 2025-06-24 PROCEDURE — 71260 CT THORAX DX C+: CPT

## 2025-06-24 RX ORDER — SODIUM CHLORIDE, SODIUM LACTATE, POTASSIUM CHLORIDE, CALCIUM CHLORIDE 600; 310; 30; 20 MG/100ML; MG/100ML; MG/100ML; MG/100ML
INJECTION, SOLUTION INTRAVENOUS
Status: DISCONTINUED | OUTPATIENT
Start: 2025-06-24 | End: 2025-06-24 | Stop reason: SDUPTHER

## 2025-06-24 RX ORDER — IOPAMIDOL 755 MG/ML
80 INJECTION, SOLUTION INTRAVASCULAR
Status: COMPLETED | OUTPATIENT
Start: 2025-06-24 | End: 2025-06-24

## 2025-06-24 RX ORDER — ETOMIDATE 2 MG/ML
INJECTION INTRAVENOUS
Status: DISCONTINUED | OUTPATIENT
Start: 2025-06-24 | End: 2025-06-24 | Stop reason: SDUPTHER

## 2025-06-24 RX ORDER — LIDOCAINE HYDROCHLORIDE 20 MG/ML
INJECTION, SOLUTION EPIDURAL; INFILTRATION; INTRACAUDAL; PERINEURAL
Status: COMPLETED
Start: 2025-06-24 | End: 2025-06-24

## 2025-06-24 RX ORDER — PROPOFOL 10 MG/ML
INJECTION, EMULSION INTRAVENOUS
Status: COMPLETED
Start: 2025-06-24 | End: 2025-06-24

## 2025-06-24 RX ORDER — ETOMIDATE 2 MG/ML
INJECTION INTRAVENOUS
Status: COMPLETED
Start: 2025-06-24 | End: 2025-06-24

## 2025-06-24 RX ADMIN — SODIUM CHLORIDE, POTASSIUM CHLORIDE, SODIUM LACTATE AND CALCIUM CHLORIDE: 600; 310; 30; 20 INJECTION, SOLUTION INTRAVENOUS at 10:33

## 2025-06-24 RX ADMIN — PROPOFOL 90 MG: 10 INJECTION, EMULSION INTRAVENOUS at 10:40

## 2025-06-24 RX ADMIN — ETOMIDATE INJECTION 2 MG: 2 SOLUTION INTRAVENOUS at 10:52

## 2025-06-24 RX ADMIN — IOPAMIDOL 80 ML: 755 INJECTION, SOLUTION INTRAVENOUS at 22:01

## 2025-06-24 RX ADMIN — TAMSULOSIN HYDROCHLORIDE 0.4 MG: 0.4 CAPSULE ORAL at 11:46

## 2025-06-24 RX ADMIN — SODIUM CHLORIDE, PRESERVATIVE FREE 10 ML: 5 INJECTION INTRAVENOUS at 11:53

## 2025-06-24 RX ADMIN — METOPROLOL SUCCINATE 25 MG: 25 TABLET, EXTENDED RELEASE ORAL at 11:44

## 2025-06-24 RX ADMIN — ETOMIDATE INJECTION 4 MG: 2 SOLUTION INTRAVENOUS at 10:45

## 2025-06-24 RX ADMIN — SODIUM CHLORIDE, PRESERVATIVE FREE 10 ML: 5 INJECTION INTRAVENOUS at 22:09

## 2025-06-24 RX ADMIN — ETOMIDATE INJECTION 10 MG: 2 SOLUTION INTRAVENOUS at 10:40

## 2025-06-24 RX ADMIN — PROPOFOL 40 MG: 10 INJECTION, EMULSION INTRAVENOUS at 10:52

## 2025-06-24 RX ADMIN — LIDOCAINE HYDROCHLORIDE 100 MG: 20 INJECTION, SOLUTION EPIDURAL; INFILTRATION; INTRACAUDAL; PERINEURAL at 10:40

## 2025-06-24 RX ADMIN — PROPOFOL 50 MG: 10 INJECTION, EMULSION INTRAVENOUS at 10:44

## 2025-06-24 NOTE — ANESTHESIA PRE PROCEDURE
Department of Anesthesiology  Preprocedure Note       Name:  Kirby Andersen   Age:  50 y.o.  :  1975                                          MRN:  [id number]         Date:  2025      Surgeon: Surgeon(s):  Richard Talbert MD    Procedure: Procedure(s):  COLONOSCOPY DIAGNOSTIC    Medications prior to admission:   Prior to Admission medications    Medication Sig Start Date End Date Taking? Authorizing Provider   metoprolol succinate (TOPROL XL) 25 MG extended release tablet Take 1 tablet by mouth daily 25  Yes Lor Hood MD   omeprazole (PRILOSEC) 40 MG delayed release capsule Take 1 capsule by mouth every morning (before breakfast) 1/13/25 3/14/25  Yareli Spears MD   albuterol sulfate HFA (VENTOLIN HFA) 108 (90 Base) MCG/ACT inhaler Inhale 2 puffs into the lungs 4 times daily as needed for Wheezing 24   Lor Hood MD   dicyclomine (BENTYL) 20 MG tablet Take 1 tablet by mouth 4 times daily 24   Amy Dennis PA-C   ondansetron (ZOFRAN) 4 MG tablet Take 1 tablet by mouth 3 times daily as needed for Nausea or Vomiting 24   Amy Dennis PA-C   ibuprofen (IBU) 600 MG tablet Take 1 tablet by mouth 2 times daily as needed for Pain Do not take on empty stomach, daily use can cause increased risk for stomach ulcer and kidney failure. 24   Lor Hood MD   ketorolac (TORADOL) 10 MG tablet Take 1 tablet by mouth every 6 hours as needed for Pain 24   Wilalrd Johnson MD   vitamin D (ERGOCALCIFEROL) 1.25 MG (00004 UT) CAPS capsule Take 1 capsule by mouth once a week 24   Lor Hood MD   Semaglutide-Weight Management (WEGOVY) 0.25 MG/0.5ML SOAJ SC injection Inject 0.25 mg into the skin every 7 days For 4 weeks then increase to 0.5 mg once a week 24   Lor Hood MD   celecoxib (CELEBREX) 200 MG capsule Take 1 capsule by mouth 1 (one) time if needed for Pain Take with food,take sparingly. 24   Lor Hood,

## 2025-06-24 NOTE — PROGRESS NOTES
Hospitalist Progress Note               Daily Progress Note: 6/24/2025      Hospital Day: 2     Chief complaint:   Chief Complaint   Patient presents with    Rectal Bleeding        Subjective:   Kirby Andersen is a 50 y.o.  male with PMHx of HTN presented  c/o 3 months of bleeding with each bowel movement, progressing,  now with blood cloths. Never had colonoscopy before, went to PCP but long wait for a procedure.   Hb stable, BP stable on admission  Pt also mentions dysuria with hesitancy to start urination and weak flow along with polynocturia    --------------------------    Patient is seen and examined today during bedside rounds with the nurse.  Wife at the bedside.  Discussed with patient and wife and other family members the results.  Mentioned that this is likely cancer causing the bleeding.  Did patient likely needs surgery.  He is very upset as well as his wife.  Discussed with the patient potential plan of treatment.  Patient still continues to bleed, hemoglobin stable      Assessment and plan    Lower GI bleed  Colon mass suspicious for cancer  Diverticulosis  Stable hemoglobin with no anemia  CTA abdomen pelvis with contrast on admission negative for any metastatic disease.  Status post colonoscopy on 6/24/2025  Consulted surgery for possible intervention, oncology consulted mainly to set up outpatient follow-up, patient was also interested in talking to oncology  Biopsy results pending    Dysuria, poly nocturia  PSA normal, no prostate enlargement according to the pelvic CT  Needs urology referral outpatient  Continue Flomax  UA with reflex, still not done    CODE STATUS full  DVT prophylaxis SCD    Barriers to discharge    Medications reviewed  Current Facility-Administered Medications   Medication Dose Route Frequency    sodium chloride flush 0.9 % injection 5-40 mL  5-40 mL IntraVENous 2 times per day    sodium chloride flush 0.9 % injection 5-40 mL  5-40 mL IntraVENous PRN    0.9 % sodium

## 2025-06-24 NOTE — ANESTHESIA POSTPROCEDURE EVALUATION
Department of Anesthesiology  Postprocedure Note    Patient: Kirby Andersen  MRN: [id number]  YOB: 1975  Date of evaluation: 6/24/2025    Procedure Summary       Date: 06/24/25 Room / Location: [facility] ENDO 03 / [facility] ENDOSCOPY    Anesthesia Start: 1033 Anesthesia Stop: 1055    Procedure: COLONOSCOPY DIAGNOSTIC (Lower GI Region) Diagnosis:       Rectal bleeding      (Rectal bleeding [K62.5])    Surgeons: Richard Talbert MD Responsible Provider: Blanca Gutiérrez MD    Anesthesia Type: MAC ASA Status: 3            Anesthesia Type: MAC    Yamilka Phase I:      Yamilka Phase II:      Anesthesia Post Evaluation    Patient location during evaluation: bedside  Patient participation: complete - patient participated  Level of consciousness: lethargic  Pain score: 0  Airway patency: patent  Nausea & Vomiting: no nausea and no vomiting  Cardiovascular status: hemodynamically stable  Respiratory status: acceptable  Hydration status: stable  Comments: VSS. Report to RN. Remains on bed  Pain management: adequate        No notable events documented.

## 2025-06-24 NOTE — CONSULTS
[facility] SURGERY CONSULT          Chief Complaint: rectal bleeding    History of Present Illness:    Mr. Kirby Andersen is a 50 y.o. year old * male presented to ER with long standing history of  painless rectal bleeding. He underwent a colonoscopic examination today which showed a partially obstructing growth at about 12 cm. Hence I was consulted.      Past Medical History:   Past Medical History:   Diagnosis Date    Hand cramps 10/10/2020    Hypercholesterolemia     Hypertension     Impotence 10/10/2020    Increased frequency of urination 10/10/2020    Leukocytosis 10/10/2020    Migraines     Mild intermittent asthma without complication 01/18/2021    Morbid obesity with BMI of 50.0-59.9, adult ([facility])     Multiple acquired skin tags 10/10/2020    Osteoarthritis of right knee, unspecified osteoarthritis type 4/19/2023    Pain in both hands 10/10/2020    Paresthesia of left upper limb 10/10/2020    Seasonal allergic rhinitis 10/10/2020    Vitamin D deficiency 10/10/2020       Past Surgical History:   Past Surgical History:   Procedure Laterality Date    APPENDECTOMY  1993    COLONOSCOPY N/A 6/24/2025    COLONOSCOPY DIAGNOSTIC performed by Richard Talbert MD at [facility] ENDOSCOPY    PROSTATE SURGERY      UROLOGICAL SURGERY          Allergy:  Allergies   Allergen Reactions    Cephalexin Anaphylaxis and Shortness Of Breath       Social History:  reports that he has never smoked. He has never used smokeless tobacco. He reports that he does not drink alcohol and does not use drugs.     Family History:  Family History   Adopted: Yes        Current Medications:  Current Facility-Administered Medications:     sodium chloride flush 0.9 % injection 5-40 mL, 5-40 mL, IntraVENous, 2 times per day, Fay Ojeda MD, 10 mL at 06/24/25 1153    sodium chloride flush 0.9 % injection 5-40 mL, 5-40 mL, IntraVENous, PRN, Fay Ojeda MD, 10 mL at 06/23/25 2032    0.9 % sodium chloride infusion, , IntraVENous, PRNLynette Yuliia  angle tenderness  Rectal: deferred  Musculoskeletal: normal ROM in upper and lower extremities, No joint swelling.  Integumentary: Warm, dry, and pink, with no rash, purpura, or petechia  Heme/Lymph: No lymphadenopathy, no bruises  Neurological:Cranial Nerves II-XII grossly intact, no gross motor or sensory deficit  Psychiatric: Cooperative with normal mood, affect, and cognition      Laboratory Values:   Recent Results (from the past 24 hours)   CBC with Auto Differential    Collection Time: 06/23/25  1:17 PM   Result Value Ref Range    WBC 8.6 4.1 - 11.1 K/uL    RBC 5.22 4.10 - 5.70 M/uL    Hemoglobin 15.1 12.1 - 17.0 g/dL    Hematocrit 42.4 36.6 - 50.3 %    MCV 81.2 80.0 - 99.0 FL    MCH 28.9 26.0 - 34.0 PG    MCHC 35.6 30.0 - 36.5 g/dL    RDW 12.7 11.5 - 14.5 %    Platelets 334 150 - 400 K/uL    MPV 9.1 8.9 - 12.9 FL    Nucleated RBCs 0.0 0.0  WBC    nRBC 0.00 0.00 - 0.01 K/uL    Neutrophils % 65.6 32.0 - 75.0 %    Lymphocytes % 22.6 12.0 - 49.0 %    Monocytes % 8.7 5.0 - 13.0 %    Eosinophils % 2.2 0.0 - 7.0 %    Basophils % 0.6 0.0 - 1.0 %    Immature Granulocytes % 0.3 0 - 0.5 %    Neutrophils Absolute 5.67 1.80 - 8.00 K/UL    Lymphocytes Absolute 1.95 0.80 - 3.50 K/UL    Monocytes Absolute 0.75 0.00 - 1.00 K/UL    Eosinophils Absolute 0.19 0.00 - 0.40 K/UL    Basophils Absolute 0.05 0.00 - 0.10 K/UL    Immature Granulocytes Absolute 0.03 0.00 - 0.04 K/UL    Differential Type AUTOMATED     Comprehensive Metabolic Panel    Collection Time: 06/23/25  1:17 PM   Result Value Ref Range    Sodium 140 136 - 145 mmol/L    Potassium 4.2 3.5 - 5.1 mmol/L    Chloride 106 97 - 108 mmol/L    CO2 27 21 - 32 mmol/L    Anion Gap 7 2 - 12 mmol/L    Glucose 125 (H) 65 - 100 mg/dL    BUN 12 6 - 20 mg/dL    Creatinine 0.85 0.70 - 1.30 mg/dL    BUN/Creatinine Ratio 14 12 - 20      Est, Glom Filt Rate >90 >60 ml/min/1.73m2    Calcium 9.4 8.5 - 10.1 mg/dL    Total Bilirubin 0.3 0.2 - 1.0 mg/dL    AST 13 (L) 15 - 37 U/L

## 2025-06-24 NOTE — CARE COORDINATION
CM reviewed chart. Patient off unit in endoscopy.    Current discharge plan is home once clinically stable.

## 2025-06-24 NOTE — PROGRESS NOTES
Hematology/Oncology   Progress Note    Patient: Kirby Andersen MRN: [id number]     YOB: 1975  Age: 50 y.o.  Sex: male      Admit Date: 6/23/2025    LOS: 0 days     Reason for Consult: colon mass    Subjective:     Patient resting bed, no acute distress. Family at the bedside. CEA unremarkable.  Still complains of blood coming from the anus even without having a bowel movement.    Objective:     Vitals:    06/24/25 1908 06/25/25 0208 06/25/25 0739 06/25/25 0949   BP: 136/76 (!) 147/81 (!) 143/89 (!) 143/88   Pulse: (!) 107 89 84 78   Resp: 18 20 18    Temp: 98.8 °F (37.1 °C) 97.9 °F (36.6 °C) 97.9 °F (36.6 °C)    TempSrc: Oral Oral Oral    SpO2: 95% 94% 93%    Weight:       Height:          Physical Exam:  Constitutional: -American male, not in any acute distress   Eyes: Sclerae anicteric. Conjunctivae no pallor.  ENMT: Oral mucosa is moist, no mucositis or petechiae.  Respiratory: Lungs are clear bilaterally.  Cardiovascular: Normal sinus rhythm  Abdomen: Soft, nontender. No guarding or rigidity. Bowel sounds present.  Extremities: No edema, cyanosis or clubbing.  Skin: No petechiae; no skin rash.  Neurologic: Alert/oriented; no focal neurological deficits.     Lab/Data Review:  Recent Labs     06/23/25  1317 06/24/25  0730 06/25/25  0535   WBC 8.6 7.5 8.2   HGB 15.1 14.5 14.2   HCT 42.4 40.8 39.0    295 296     Recent Labs     06/23/25  1317      K 4.2      CO2 27   BUN 12   ALT 25   INR 1.0     No results for input(s): \"PH\", \"PCO2\", \"PO2\", \"HCO3\", \"FIO2\" in the last 72 hours.  Recent Results (from the past 24 hours)   CBC    Collection Time: 06/25/25  5:35 AM   Result Value Ref Range    WBC 8.2 4.1 - 11.1 K/uL    RBC 4.79 4.10 - 5.70 M/uL    Hemoglobin 14.2 12.1 - 17.0 g/dL    Hematocrit 39.0 36.6 - 50.3 %    MCV 81.4 80.0 - 99.0 FL    MCH 29.6 26.0 - 34.0 PG    MCHC 36.4 30.0 - 36.5 g/dL    RDW 12.7 11.5 - 14.5 %    Platelets 296 150 - 400 K/uL    MPV 9.6 8.9 -

## 2025-06-24 NOTE — PROGRESS NOTES
Spiritual Health History and Assessment/Progress Note  [facility]    Initial Encounter, Emotional distress,  ,      Name: Kirby Andersen MRN: [id number]    Age: 50 y.o.     Sex: male   Language: English   Buddhist: Latter day   Rectal bleeding     Date: 6/24/2025            Total Time Calculated: 40 min              Spiritual Assessment began in [facility]        Referral/Consult From: Patient, Nurse   Encounter Overview/Reason: Initial Encounter  Service Provided For: Patient and family together    Silva, Belief, Meaning:   Patient identifies as spiritual, is connected with a silva tradition or spiritual practice, and has beliefs or practices that help with coping during difficult times  Family/Friends identify as spiritual, are connected with a silva tradition or spiritual practice, and have beliefs or practices that help with coping during difficult times      Importance and Influence:  Patient has spiritual/personal beliefs that influence decisions regarding their health  Family/Friends have spiritual/personal beliefs that influence decisions regarding the patient's health    Community:  Patient is connected with a spiritual community  Family/Friends are connected with a spiritual community:    Assessment and Plan of Care:     Patient Interventions include: Facilitated expression of thoughts and feelings and Affirmed coping skills/support systems  Family/Friends Interventions include: Facilitated expression of thoughts and feelings and Affirmed coping skills/support systems    Patient Plan of Care: Spiritual Care available upon further referral  Family/Friends Plan of Care: Spiritual Care available upon further referral    Patient has concerns about possible diagnosis of cancer. Prayer was conducted,  listened while the patient spoke about his life and concerns.    Electronically signed by Brent Aguero Jr, Chaplain Resident on 6/24/2025 at 2:43 PM

## 2025-06-24 NOTE — CONSULTS
Hematology/Oncology Consult    Patient: Kirby Andersen MRN: [id number]     YOB: 1975  Age: 50 y.o.  Sex: male      HPI      Kirby Andersen is a 50 y.o. male who is being seen for colon mass.    Patient presented to the ER complaining of bloody bowel movements ongoing for 3 months now presenting with blood clots. Patient states has never had a colonoscopy before. CTA abdomen pelvis showed no acute findings in the abdomen or pelvis. Patient underwent colonoscopy today where a partially obstructing tumor was found 12 cm proximal to anus, pathology pending. Patient also complaining of difficulty urinating, states he was having loss of stool when trying to urinate.  Patient's wife and Anabaptism friend at the bedside.    PMH: HTN, obesity   Family history: Patient was adopted, medical history unknown  Social history: Denies tobacco and alcohol use  Occupational history: Patient currently works at [employer]    Past Medical History:   Diagnosis Date    Hand cramps 10/10/2020    Hypercholesterolemia     Hypertension     Impotence 10/10/2020    Increased frequency of urination 10/10/2020    Leukocytosis 10/10/2020    Migraines     Mild intermittent asthma without complication 01/18/2021    Morbid obesity with BMI of 50.0-59.9, adult (HCC)     Multiple acquired skin tags 10/10/2020    Osteoarthritis of right knee, unspecified osteoarthritis type 4/19/2023    Pain in both hands 10/10/2020    Paresthesia of left upper limb 10/10/2020    Seasonal allergic rhinitis 10/10/2020    Vitamin D deficiency 10/10/2020     Past Surgical History:   Procedure Laterality Date    APPENDECTOMY  1993    COLONOSCOPY N/A 6/24/2025    COLONOSCOPY DIAGNOSTIC performed by Richard Talbert MD at [facility] ENDOSCOPY    PROSTATE SURGERY      UROLOGICAL SURGERY        Family History   Adopted: Yes     Social History     Tobacco Use    Smoking status: Never    Smokeless tobacco: Never   Substance Use Topics    Alcohol use: No

## 2025-06-24 NOTE — PLAN OF CARE
Problem: Discharge Planning  Goal: Discharge to home or other facility with appropriate resources  Outcome: Progressing  Flowsheets (Taken 6/23/2025 2030)  Discharge to home or other facility with appropriate resources: Identify barriers to discharge with patient and caregiver     Problem: Pain  Goal: Verbalizes/displays adequate comfort level or baseline comfort level  Outcome: Progressing  Flowsheets (Taken 6/24/2025 0045)  Verbalizes/displays adequate comfort level or baseline comfort level: Encourage patient to monitor pain and request assistance     Problem: Safety - Adult  Goal: Free from fall injury  Outcome: Progressing

## 2025-06-24 NOTE — PROGRESS NOTES
Progress Note    Patient: Kirby Andersen MRN: [id number]  SSN: [id number]    YOB: 1975  Age: 50 y.o.  Sex: male      Admit Date: 6/23/2025    LOS: 0 days     Subjective:     Patient had a colonoscopy today which shows a tumor at approximately 12 cm from the anal verge.  This was biopsied and tattooed he is doing well denies any abdominal pain nausea or vomiting he still has some rectal bleeding.    Objective:     Vitals:    06/24/25 1105 06/24/25 1110 06/24/25 1144 06/24/25 1616   BP: (!) 151/95 (!) 148/97 (!) 148/97 124/83   Pulse: 77 78 78 (!) 103   Resp:  14  14   Temp: 97.5 °F (36.4 °C)   97.7 °F (36.5 °C)   TempSrc: Oral   Oral   SpO2: 97% 97%  93%   Weight:       Height:            Intake and Output:  Current Shift: 06/24 0701 - 06/24 1900  In: 300 [I.V.:300]  Out: -   Last three shifts: 06/22 1901 - 06/24 0700  In: 20 [I.V.:20]  Out: -     Physical Exam:   Skin:  Extremities and face reveal no rashes. No markham erythema.   HEENT: Sclerae anicteric. Extra-occular muscles are intact. No abnormal pigmentation of the lips. The neck is supple.  Cardiovascular: Regular rate and rhythm.  Respiratory:  Comfortable breathing with no accessory muscle use.   GI:  Abdomen nondistended, soft, and nontender. No enlargement of the liver or spleen. No masses palpable.  Rectal:  Deferred  Musculoskeletal: Extremities have good range of motion.  Neurological:  Gross memory appears intact.  Patient is alert and oriented.  Psychiatric:  Mood appears appropriate with judgement intact.  Lymphatic:  No visible adenopathy      Lab/Data Review:  Recent Results (from the past 24 hours)   PSA Screening    Collection Time: 06/23/25  4:55 PM   Result Value Ref Range    PSA 0.7 0.01 - 4.0 ng/mL   CBC    Collection Time: 06/24/25  7:30 AM   Result Value Ref Range    WBC 7.5 4.1 - 11.1 K/uL    RBC 4.99 4.10 - 5.70 M/uL    Hemoglobin 14.5 12.1 - 17.0 g/dL    Hematocrit 40.8 36.6 - 50.3 %    MCV 81.8 80.0 - 99.0

## 2025-06-25 PROBLEM — K63.89 COLONIC MASS: Status: ACTIVE | Noted: 2025-06-25

## 2025-06-25 LAB
CEA SERPL-MCNC: 1 NG/ML
ERYTHROCYTE [DISTWIDTH] IN BLOOD BY AUTOMATED COUNT: 12.7 % (ref 11.5–14.5)
HCT VFR BLD AUTO: 39 % (ref 36.6–50.3)
HGB BLD-MCNC: 14.2 G/DL (ref 12.1–17)
MCH RBC QN AUTO: 29.6 PG (ref 26–34)
MCHC RBC AUTO-ENTMCNC: 36.4 G/DL (ref 30–36.5)
MCV RBC AUTO: 81.4 FL (ref 80–99)
NRBC # BLD: 0 K/UL (ref 0–0.01)
NRBC BLD-RTO: 0 PER 100 WBC
PLATELET # BLD AUTO: 296 K/UL (ref 150–400)
PMV BLD AUTO: 9.6 FL (ref 8.9–12.9)
RBC # BLD AUTO: 4.79 M/UL (ref 4.1–5.7)
WBC # BLD AUTO: 8.2 K/UL (ref 4.1–11.1)

## 2025-06-25 PROCEDURE — 2500000003 HC RX 250 WO HCPCS

## 2025-06-25 PROCEDURE — G0378 HOSPITAL OBSERVATION PER HR: HCPCS

## 2025-06-25 PROCEDURE — 85027 COMPLETE CBC AUTOMATED: CPT

## 2025-06-25 PROCEDURE — 6370000000 HC RX 637 (ALT 250 FOR IP)

## 2025-06-25 PROCEDURE — 82378 CARCINOEMBRYONIC ANTIGEN: CPT

## 2025-06-25 PROCEDURE — 36415 COLL VENOUS BLD VENIPUNCTURE: CPT

## 2025-06-25 PROCEDURE — 1100000000 HC RM PRIVATE

## 2025-06-25 RX ORDER — BUTALBITAL, ACETAMINOPHEN AND CAFFEINE 50; 325; 40 MG/1; MG/1; MG/1
1 TABLET ORAL EVERY 4 HOURS PRN
Status: COMPLETED | OUTPATIENT
Start: 2025-06-25 | End: 2025-06-26

## 2025-06-25 RX ORDER — IBUPROFEN 600 MG/1
600 TABLET, FILM COATED ORAL EVERY 8 HOURS PRN
Status: DISCONTINUED | OUTPATIENT
Start: 2025-06-25 | End: 2025-06-27 | Stop reason: HOSPADM

## 2025-06-25 RX ADMIN — TAMSULOSIN HYDROCHLORIDE 0.4 MG: 0.4 CAPSULE ORAL at 08:15

## 2025-06-25 RX ADMIN — METOPROLOL SUCCINATE 25 MG: 25 TABLET, EXTENDED RELEASE ORAL at 08:15

## 2025-06-25 RX ADMIN — ACETAMINOPHEN 650 MG: 325 TABLET ORAL at 11:41

## 2025-06-25 RX ADMIN — SODIUM CHLORIDE, PRESERVATIVE FREE 10 ML: 5 INJECTION INTRAVENOUS at 08:16

## 2025-06-25 RX ADMIN — IBUPROFEN 600 MG: 600 TABLET, FILM COATED ORAL at 11:41

## 2025-06-25 RX ADMIN — SODIUM CHLORIDE, PRESERVATIVE FREE 10 ML: 5 INJECTION INTRAVENOUS at 20:30

## 2025-06-25 ASSESSMENT — PAIN DESCRIPTION - ORIENTATION: ORIENTATION: ANTERIOR

## 2025-06-25 ASSESSMENT — PAIN DESCRIPTION - LOCATION: LOCATION: HEAD

## 2025-06-25 ASSESSMENT — PAIN SCALES - GENERAL
PAINLEVEL_OUTOF10: 5
PAINLEVEL_OUTOF10: 4

## 2025-06-25 ASSESSMENT — PAIN DESCRIPTION - DESCRIPTORS: DESCRIPTORS: ACHING;DISCOMFORT

## 2025-06-25 NOTE — PLAN OF CARE
Problem: Discharge Planning  Goal: Discharge to home or other facility with appropriate resources  6/25/2025 0013 by Kd Meyers, RN  Outcome: Progressing  6/24/2025 1611 by Lorena Mccallum LPN  Outcome: Progressing     Problem: Safety - Adult  Goal: Free from fall injury  Outcome: Progressing      dry unproductive

## 2025-06-25 NOTE — CARE COORDINATION
CM reviewed chart. Patient NPO. Being followed by GI and surgery.    Current discharge plan is home once clinically.

## 2025-06-25 NOTE — ED PROVIDER NOTES
(6/23/2025)    AUDIT-C     Frequency of Alcohol Consumption: Never     Average Number of Drinks: Patient does not drink     Frequency of Binge Drinking: Never   Financial Resource Strain: Low Risk  (4/24/2024)    Overall Financial Resource Strain (CARDIA)     Difficulty of Paying Living Expenses: Not hard at all   Food Insecurity: Food Insecurity Present (6/23/2025)    Hunger Vital Sign     Worried About Running Out of Food in the Last Year: Often true     Ran Out of Food in the Last Year: Sometimes true   Transportation Needs: Unmet Transportation Needs (6/23/2025)    PRAPARE - Transportation     Lack of Transportation (Medical): Yes     Lack of Transportation (Non-Medical): Yes   Physical Activity: Not on file   Stress: Not on file   Social Connections: Not on file   Intimate Partner Violence: Not on file   Depression: Not at risk (10/7/2024)    PHQ-2     PHQ-2 Score: 0   Housing Stability: Low Risk  (6/23/2025)    Housing Stability Vital Sign     Unable to Pay for Housing in the Last Year: No     Number of Times Moved in the Last Year: 0     Homeless in the Last Year: No   Interpersonal Safety: Not At Risk (6/23/2025)    Interpersonal Safety Domain Source: IP Abuse Screening     Physical abuse: Denies     Verbal abuse: Denies     Emotional abuse: Denies     Financial abuse: Denies     Sexual abuse: Denies   Utilities: Not At Risk (6/23/2025)    [facility] Utilities     Threatened with loss of utilities: No     PHYSICAL EXAM   Physical Exam  HENT:      Head: Normocephalic and atraumatic.      Mouth/Throat:      Mouth: Mucous membranes are moist.   Eyes:      Extraocular Movements: Extraocular movements intact.      Pupils: Pupils are equal, round, and reactive to light.   Cardiovascular:      Rate and Rhythm: Normal rate and regular rhythm.   Pulmonary:      Effort: Pulmonary effort is normal.      Breath sounds: Normal breath sounds.   Abdominal:      Palpations: Abdomen is soft.      Tenderness: There is no abdominal  lungs 4 times daily as needed for Wheezing     celecoxib 200 MG capsule  Commonly known as: CELEBREX  Take 1 capsule by mouth 1 (one) time if needed for Pain Take with food,take sparingly.     dicyclomine 20 MG tablet  Commonly known as: BENTYL  Take 1 tablet by mouth 4 times daily     ibuprofen 600 MG tablet  Commonly known as: IBU  Take 1 tablet by mouth 2 times daily as needed for Pain Do not take on empty stomach, daily use can cause increased risk for stomach ulcer and kidney failure.     ketorolac 10 MG tablet  Commonly known as: TORADOL  Take 1 tablet by mouth every 6 hours as needed for Pain     metoprolol succinate 25 MG extended release tablet  Commonly known as: TOPROL XL  Take 1 tablet by mouth daily     * omeprazole 40 MG delayed release capsule  Commonly known as: PRILOSEC  TAKE 1 CAPSULE BY MOUTH DAILY. TAKE 30 MINUTES BEFORE EATING ONCE A DAY INDICATIONS: STOMACH ULCER FROM ASPIRIN/IBUPROFEN-LIKE DRUGS PREVENTION     * omeprazole 40 MG delayed release capsule  Commonly known as: PRILOSEC  Take 1 capsule by mouth every morning (before breakfast)     ondansetron 4 MG tablet  Commonly known as: ZOFRAN  Take 1 tablet by mouth 3 times daily as needed for Nausea or Vomiting     Semaglutide-Weight Management 0.25 MG/0.5ML Soaj SC injection  Commonly known as: WEGOVY  Inject 0.25 mg into the skin every 7 days For 4 weeks then increase to 0.5 mg once a week     vitamin D 1.25 MG (70200 UT) Caps capsule  Commonly known as: ERGOCALCIFEROL  Take 1 capsule by mouth once a week           * This list has 2 medication(s) that are the same as other medications prescribed for you. Read the directions carefully, and ask your doctor or other care provider to review them with you.                    DISCONTINUED MEDICATIONS:  Current Discharge Medication List          I am the Primary Clinician of Record. Kb Torres MD (electronically signed)    (Please note that parts of this dictation were completed with

## 2025-06-25 NOTE — PROGRESS NOTES
..4 Eyes Skin Assessment     NAME:  Kirby Andersen  YOB: 1975  MEDICAL RECORD NUMBER:  [id number]    The patient is being assessed for  Other weekly assessment    I agree that at least one RN has performed a thorough Head to Toe Skin Assessment on the patient. ALL assessment sites listed below have been assessed.      Areas assessed by both nurses:    Head, Face, Ears, Shoulders, Back, Chest, Arms, Elbows, Hands, Sacrum. Buttock, Coccyx, Ischium, and Legs. Feet and Heels        Does the Patient have a Wound? No noted wound(s)       Deric Prevention initiated by RN: Yes  Wound Care Orders initiated by RN: No    Pressure Injury (Stage 3,4, Unstageable, DTI, NWPT, and Complex wounds) if present, place Wound referral order by RN under : No    New Ostomies, if present place, Ostomy referral order under : No     Nurse 1 eSignature: Electronically signed by Lorena Mccallum LPN on 6/25/25 at 7:53 PM EDT    **SHARE this note so that the co-signing nurse can place an eSignature**    Nurse 2 eSignature: Electronically signed by Carmina Jeffery RN on 6/25/25 at 7:55 PM EDT

## 2025-06-25 NOTE — PROGRESS NOTES
Hospitalist Progress Note               Daily Progress Note: 6/25/2025      Hospital Day: 3     Chief complaint:   Chief Complaint   Patient presents with    Rectal Bleeding        Subjective:   Kirby Andersen is a 50 y.o.  male with PMHx of HTN presented  c/o 3 months of bleeding with each bowel movement, progressing,  now with blood cloths. Never had colonoscopy before, went to PCP but long wait for a procedure.   Hb stable, BP stable on admission  Pt also mentions dysuria with hesitancy to start urination and weak flow along with polynocturia    --------------------------    Patient seen and examined today.  Evaluated bedside.  Discussed with him plan for the surgery today.  He agreed to proceed.  Patient was complain of some headaches for which we will give him ibuprofen.  Still reports blood coming from the anal canal even without having bowel movement      Assessment and plan    Lower GI bleed  Colon mass suspicious for cancer  Diverticulosis  Stable hemoglobin with no anemia  CTA abdomen pelvis with contrast on admission negative for any metastatic disease.  Status post colonoscopy on 6/24/2025  Consulted general surgery, surgery is planned for today    Dysuria, poly nocturia  PSA normal, no prostate enlargement according to the pelvic CT  Needs urology referral outpatient  Continue Flomax  UA with reflex, still not done    CODE STATUS full  DVT prophylaxis SCD    Barriers to discharge    Medications reviewed  Current Facility-Administered Medications   Medication Dose Route Frequency    sodium chloride flush 0.9 % injection 5-40 mL  5-40 mL IntraVENous 2 times per day    sodium chloride flush 0.9 % injection 5-40 mL  5-40 mL IntraVENous PRN    0.9 % sodium chloride infusion   IntraVENous PRN    potassium chloride (KLOR-CON M) extended release tablet 40 mEq  40 mEq Oral PRN    Or    potassium bicarb-citric acid (EFFER-K) effervescent tablet 40 mEq  40 mEq Oral PRN    Or    potassium chloride 10 mEq/100 mL

## 2025-06-25 NOTE — PLAN OF CARE
Problem: Pain  Goal: Verbalizes/displays adequate comfort level or baseline comfort level  6/25/2025 0817 by Lorena Mccallum LPN  Outcome: Progressing  6/25/2025 0013 by Kd Meyers, RN  Outcome: Adequate for Discharge     Problem: Safety - Adult  Goal: Free from fall injury  6/25/2025 0817 by Lorena Mccallum LPN  Outcome: Progressing  6/25/2025 0013 by Kd Meyers, RN  Outcome: Progressing

## 2025-06-26 ENCOUNTER — ANESTHESIA (OUTPATIENT)
Facility: HOSPITAL | Age: 50
End: 2025-06-26
Payer: MEDICARE

## 2025-06-26 ENCOUNTER — ANESTHESIA EVENT (OUTPATIENT)
Facility: HOSPITAL | Age: 50
End: 2025-06-26
Payer: MEDICARE

## 2025-06-26 LAB
ERYTHROCYTE [DISTWIDTH] IN BLOOD BY AUTOMATED COUNT: 12.7 % (ref 11.5–14.5)
HCT VFR BLD AUTO: 41.1 % (ref 36.6–50.3)
HGB BLD-MCNC: 14.4 G/DL (ref 12.1–17)
MCH RBC QN AUTO: 28.6 PG (ref 26–34)
MCHC RBC AUTO-ENTMCNC: 35 G/DL (ref 30–36.5)
MCV RBC AUTO: 81.5 FL (ref 80–99)
NRBC # BLD: 0 K/UL (ref 0–0.01)
NRBC BLD-RTO: 0 PER 100 WBC
PLATELET # BLD AUTO: 309 K/UL (ref 150–400)
PMV BLD AUTO: 9.3 FL (ref 8.9–12.9)
RBC # BLD AUTO: 5.04 M/UL (ref 4.1–5.7)
WBC # BLD AUTO: 7.5 K/UL (ref 4.1–11.1)

## 2025-06-26 PROCEDURE — 3600000002 HC SURGERY LEVEL 2 BASE: Performed by: SURGERY

## 2025-06-26 PROCEDURE — 3700000001 HC ADD 15 MINUTES (ANESTHESIA): Performed by: SURGERY

## 2025-06-26 PROCEDURE — 3700000000 HC ANESTHESIA ATTENDED CARE: Performed by: SURGERY

## 2025-06-26 PROCEDURE — 85027 COMPLETE CBC AUTOMATED: CPT

## 2025-06-26 PROCEDURE — 6370000000 HC RX 637 (ALT 250 FOR IP)

## 2025-06-26 PROCEDURE — 36415 COLL VENOUS BLD VENIPUNCTURE: CPT

## 2025-06-26 PROCEDURE — 2500000003 HC RX 250 WO HCPCS

## 2025-06-26 PROCEDURE — 2709999900 HC NON-CHARGEABLE SUPPLY: Performed by: SURGERY

## 2025-06-26 PROCEDURE — 6360000002 HC RX W HCPCS: Performed by: SURGERY

## 2025-06-26 PROCEDURE — 3600000012 HC SURGERY LEVEL 2 ADDTL 15MIN: Performed by: SURGERY

## 2025-06-26 PROCEDURE — 7100000001 HC PACU RECOVERY - ADDTL 15 MIN: Performed by: SURGERY

## 2025-06-26 PROCEDURE — 6360000002 HC RX W HCPCS: Performed by: NURSE ANESTHETIST, CERTIFIED REGISTERED

## 2025-06-26 PROCEDURE — 7100000000 HC PACU RECOVERY - FIRST 15 MIN: Performed by: SURGERY

## 2025-06-26 PROCEDURE — 2580000003 HC RX 258: Performed by: NURSE ANESTHETIST, CERTIFIED REGISTERED

## 2025-06-26 PROCEDURE — 0DJD8ZZ INSPECTION OF LOWER INTESTINAL TRACT, VIA NATURAL OR ARTIFICIAL OPENING ENDOSCOPIC: ICD-10-PCS | Performed by: SURGERY

## 2025-06-26 PROCEDURE — 1100000000 HC RM PRIVATE

## 2025-06-26 RX ORDER — OXYCODONE HYDROCHLORIDE 5 MG/1
10 TABLET ORAL PRN
Status: DISCONTINUED | OUTPATIENT
Start: 2025-06-26 | End: 2025-06-26 | Stop reason: HOSPADM

## 2025-06-26 RX ORDER — MEPERIDINE HYDROCHLORIDE 25 MG/ML
12.5 INJECTION INTRAMUSCULAR; INTRAVENOUS; SUBCUTANEOUS EVERY 5 MIN PRN
Status: DISCONTINUED | OUTPATIENT
Start: 2025-06-26 | End: 2025-06-26 | Stop reason: HOSPADM

## 2025-06-26 RX ORDER — SODIUM CHLORIDE, SODIUM LACTATE, POTASSIUM CHLORIDE, CALCIUM CHLORIDE 600; 310; 30; 20 MG/100ML; MG/100ML; MG/100ML; MG/100ML
INJECTION, SOLUTION INTRAVENOUS ONCE
Status: DISCONTINUED | OUTPATIENT
Start: 2025-06-26 | End: 2025-06-26 | Stop reason: HOSPADM

## 2025-06-26 RX ORDER — LABETALOL HYDROCHLORIDE 5 MG/ML
10 INJECTION, SOLUTION INTRAVENOUS
Status: DISCONTINUED | OUTPATIENT
Start: 2025-06-26 | End: 2025-06-26 | Stop reason: HOSPADM

## 2025-06-26 RX ORDER — HYDROMORPHONE HYDROCHLORIDE 1 MG/ML
0.5 INJECTION, SOLUTION INTRAMUSCULAR; INTRAVENOUS; SUBCUTANEOUS EVERY 5 MIN PRN
Status: DISCONTINUED | OUTPATIENT
Start: 2025-06-26 | End: 2025-06-26 | Stop reason: HOSPADM

## 2025-06-26 RX ORDER — ONDANSETRON 2 MG/ML
4 INJECTION INTRAMUSCULAR; INTRAVENOUS
Status: DISCONTINUED | OUTPATIENT
Start: 2025-06-26 | End: 2025-06-26 | Stop reason: HOSPADM

## 2025-06-26 RX ORDER — METOCLOPRAMIDE HYDROCHLORIDE 5 MG/ML
10 INJECTION INTRAMUSCULAR; INTRAVENOUS
Status: DISCONTINUED | OUTPATIENT
Start: 2025-06-26 | End: 2025-06-26 | Stop reason: HOSPADM

## 2025-06-26 RX ORDER — DEXAMETHASONE SODIUM PHOSPHATE 4 MG/ML
INJECTION, SOLUTION INTRA-ARTICULAR; INTRALESIONAL; INTRAMUSCULAR; INTRAVENOUS; SOFT TISSUE
Status: DISCONTINUED | OUTPATIENT
Start: 2025-06-26 | End: 2025-06-26 | Stop reason: SDUPTHER

## 2025-06-26 RX ORDER — IPRATROPIUM BROMIDE AND ALBUTEROL SULFATE 2.5; .5 MG/3ML; MG/3ML
1 SOLUTION RESPIRATORY (INHALATION)
Status: DISCONTINUED | OUTPATIENT
Start: 2025-06-26 | End: 2025-06-26 | Stop reason: HOSPADM

## 2025-06-26 RX ORDER — LEVOFLOXACIN 5 MG/ML
500 INJECTION, SOLUTION INTRAVENOUS
Status: COMPLETED | OUTPATIENT
Start: 2025-06-27 | End: 2025-06-26

## 2025-06-26 RX ORDER — SODIUM CHLORIDE 0.9 % (FLUSH) 0.9 %
5-40 SYRINGE (ML) INJECTION EVERY 12 HOURS SCHEDULED
Status: DISCONTINUED | OUTPATIENT
Start: 2025-06-26 | End: 2025-06-26 | Stop reason: HOSPADM

## 2025-06-26 RX ORDER — ONDANSETRON 2 MG/ML
INJECTION INTRAMUSCULAR; INTRAVENOUS
Status: DISCONTINUED | OUTPATIENT
Start: 2025-06-26 | End: 2025-06-26 | Stop reason: SDUPTHER

## 2025-06-26 RX ORDER — LEVOFLOXACIN 5 MG/ML
500 INJECTION, SOLUTION INTRAVENOUS EVERY 24 HOURS
Status: DISCONTINUED | OUTPATIENT
Start: 2025-06-26 | End: 2025-06-26

## 2025-06-26 RX ORDER — DEXTROSE MONOHYDRATE 100 MG/ML
INJECTION, SOLUTION INTRAVENOUS CONTINUOUS PRN
Status: DISCONTINUED | OUTPATIENT
Start: 2025-06-26 | End: 2025-06-26 | Stop reason: HOSPADM

## 2025-06-26 RX ORDER — FENTANYL CITRATE 0.05 MG/ML
50 INJECTION, SOLUTION INTRAMUSCULAR; INTRAVENOUS EVERY 5 MIN PRN
Status: DISCONTINUED | OUTPATIENT
Start: 2025-06-26 | End: 2025-06-26 | Stop reason: HOSPADM

## 2025-06-26 RX ORDER — LEVOFLOXACIN 5 MG/ML
INJECTION, SOLUTION INTRAVENOUS
Status: COMPLETED
Start: 2025-06-26 | End: 2025-06-26

## 2025-06-26 RX ORDER — SODIUM CHLORIDE 9 MG/ML
INJECTION, SOLUTION INTRAVENOUS PRN
Status: DISCONTINUED | OUTPATIENT
Start: 2025-06-26 | End: 2025-06-26 | Stop reason: HOSPADM

## 2025-06-26 RX ORDER — SODIUM CHLORIDE, SODIUM LACTATE, POTASSIUM CHLORIDE, CALCIUM CHLORIDE 600; 310; 30; 20 MG/100ML; MG/100ML; MG/100ML; MG/100ML
INJECTION, SOLUTION INTRAVENOUS
Status: DISCONTINUED | OUTPATIENT
Start: 2025-06-26 | End: 2025-06-26 | Stop reason: SDUPTHER

## 2025-06-26 RX ORDER — PROPOFOL 10 MG/ML
INJECTION, EMULSION INTRAVENOUS
Status: DISCONTINUED | OUTPATIENT
Start: 2025-06-26 | End: 2025-06-26 | Stop reason: SDUPTHER

## 2025-06-26 RX ORDER — NALOXONE HYDROCHLORIDE 0.4 MG/ML
INJECTION, SOLUTION INTRAMUSCULAR; INTRAVENOUS; SUBCUTANEOUS PRN
Status: DISCONTINUED | OUTPATIENT
Start: 2025-06-26 | End: 2025-06-26 | Stop reason: HOSPADM

## 2025-06-26 RX ORDER — SODIUM CHLORIDE 0.9 % (FLUSH) 0.9 %
5-40 SYRINGE (ML) INJECTION PRN
Status: DISCONTINUED | OUTPATIENT
Start: 2025-06-26 | End: 2025-06-26 | Stop reason: HOSPADM

## 2025-06-26 RX ORDER — GLUCAGON 1 MG/ML
1 KIT INJECTION PRN
Status: DISCONTINUED | OUTPATIENT
Start: 2025-06-26 | End: 2025-06-26 | Stop reason: HOSPADM

## 2025-06-26 RX ORDER — HYDRALAZINE HYDROCHLORIDE 20 MG/ML
10 INJECTION INTRAMUSCULAR; INTRAVENOUS
Status: DISCONTINUED | OUTPATIENT
Start: 2025-06-26 | End: 2025-06-26 | Stop reason: HOSPADM

## 2025-06-26 RX ORDER — LORAZEPAM 2 MG/ML
0.5 INJECTION INTRAMUSCULAR
Status: DISCONTINUED | OUTPATIENT
Start: 2025-06-26 | End: 2025-06-26 | Stop reason: HOSPADM

## 2025-06-26 RX ORDER — DIPHENHYDRAMINE HYDROCHLORIDE 50 MG/ML
12.5 INJECTION, SOLUTION INTRAMUSCULAR; INTRAVENOUS
Status: DISCONTINUED | OUTPATIENT
Start: 2025-06-26 | End: 2025-06-26 | Stop reason: HOSPADM

## 2025-06-26 RX ORDER — LIDOCAINE HYDROCHLORIDE 20 MG/ML
INJECTION, SOLUTION EPIDURAL; INFILTRATION; INTRACAUDAL; PERINEURAL
Status: DISCONTINUED | OUTPATIENT
Start: 2025-06-26 | End: 2025-06-26 | Stop reason: SDUPTHER

## 2025-06-26 RX ORDER — OXYCODONE HYDROCHLORIDE 5 MG/1
5 TABLET ORAL PRN
Status: DISCONTINUED | OUTPATIENT
Start: 2025-06-26 | End: 2025-06-26 | Stop reason: HOSPADM

## 2025-06-26 RX ORDER — LIDOCAINE 4 G/G
1 PATCH TOPICAL AS NEEDED
Status: DISCONTINUED | OUTPATIENT
Start: 2025-06-26 | End: 2025-06-26 | Stop reason: HOSPADM

## 2025-06-26 RX ORDER — MIDAZOLAM HYDROCHLORIDE 1 MG/ML
INJECTION, SOLUTION INTRAMUSCULAR; INTRAVENOUS
Status: DISCONTINUED | OUTPATIENT
Start: 2025-06-26 | End: 2025-06-26 | Stop reason: SDUPTHER

## 2025-06-26 RX ORDER — FENTANYL CITRATE 50 UG/ML
INJECTION, SOLUTION INTRAMUSCULAR; INTRAVENOUS
Status: DISCONTINUED | OUTPATIENT
Start: 2025-06-26 | End: 2025-06-26 | Stop reason: SDUPTHER

## 2025-06-26 RX ADMIN — SODIUM CHLORIDE, PRESERVATIVE FREE 10 ML: 5 INJECTION INTRAVENOUS at 08:27

## 2025-06-26 RX ADMIN — LEVOFLOXACIN 500 MG: 5 INJECTION, SOLUTION INTRAVENOUS at 16:38

## 2025-06-26 RX ADMIN — ONDANSETRON 4 MG: 2 INJECTION, SOLUTION INTRAMUSCULAR; INTRAVENOUS at 16:29

## 2025-06-26 RX ADMIN — DEXAMETHASONE SODIUM PHOSPHATE 4 MG: 4 INJECTION, SOLUTION INTRA-ARTICULAR; INTRALESIONAL; INTRAMUSCULAR; INTRAVENOUS; SOFT TISSUE at 17:16

## 2025-06-26 RX ADMIN — TAMSULOSIN HYDROCHLORIDE 0.4 MG: 0.4 CAPSULE ORAL at 08:26

## 2025-06-26 RX ADMIN — LIDOCAINE HYDROCHLORIDE 100 MG: 20 INJECTION, SOLUTION EPIDURAL; INFILTRATION; INTRACAUDAL; PERINEURAL at 16:24

## 2025-06-26 RX ADMIN — SODIUM CHLORIDE, PRESERVATIVE FREE 10 ML: 5 INJECTION INTRAVENOUS at 21:48

## 2025-06-26 RX ADMIN — ACETAMINOPHEN 650 MG: 325 TABLET ORAL at 23:50

## 2025-06-26 RX ADMIN — METOPROLOL SUCCINATE 25 MG: 25 TABLET, EXTENDED RELEASE ORAL at 08:26

## 2025-06-26 RX ADMIN — PROPOFOL 200 MG: 10 INJECTION, EMULSION INTRAVENOUS at 16:24

## 2025-06-26 RX ADMIN — FENTANYL CITRATE 100 MCG: 50 INJECTION INTRAMUSCULAR; INTRAVENOUS at 16:26

## 2025-06-26 RX ADMIN — BUTALBITAL, ACETAMINOPHEN, AND CAFFEINE 1 TABLET: 325; 50; 40 TABLET ORAL at 19:12

## 2025-06-26 RX ADMIN — SODIUM CHLORIDE, POTASSIUM CHLORIDE, SODIUM LACTATE AND CALCIUM CHLORIDE: 600; 310; 30; 20 INJECTION, SOLUTION INTRAVENOUS at 16:15

## 2025-06-26 RX ADMIN — MIDAZOLAM 2 MG: 1 INJECTION INTRAMUSCULAR; INTRAVENOUS at 16:15

## 2025-06-26 ASSESSMENT — PAIN SCALES - GENERAL: PAINLEVEL_OUTOF10: 5

## 2025-06-26 ASSESSMENT — PAIN DESCRIPTION - LOCATION: LOCATION: HEAD

## 2025-06-26 NOTE — OP NOTE
Operative Note      Patient: Kirby Andersen  YOB: 1975  MRN: [id number]    Date of Procedure: 6/26/2025    Pre-Op Diagnosis Codes:      * Malignant neoplasm of rectum (HCC) [C20]    Post-Op Diagnosis: Same       Procedure(s):  RECTAL EXAM UNDER ANESTHESIA and rigid proctosigmoidoscopy    Surgeon(s):  Kylie Neville MD    Assistant:  Ms. Lock    Anesthesia: LMA/General    Anesthesiologist: Dr. Rahman    CRNA: Mr. Caitlyn Arnold    Indication EUA for colorectal cancer    Procedure:   Patient was taken to the operative room.  After LMA intubation patient was carefully placed in dorsal lithotomy position with both legs in Azam stirrups.  The anal area was prepped and draped in the usual sterile fashion.  Timeout is completed.  A digital rectal examination was completed.  I could feel a fleshy mass anteriorly at approximately about 8 to 9 cm from the anal verge.  The tip of my index finger could reach the inferior most portion of the mass with slight pushing of the anal verge upwards I would say at approximately 8 to 9 cm.  There was no active bleeding.  Then I used a rigid procto sigmoidoscopy which I introduced carefully through the anal canal and advanced slowly at approximately 9 cm I could see a fleshy anterior mass with a tattoo extending few centimeters proximally.  Seem to be occupying the anterior aspect and occupying less than one third of the anterior circumference of the rectum.  There was no active bleeding.  The I carefully withdrew the scope and completed the procedure.    Patient tolerated procedure very well.  There were no complication.  No blood loss.  Patient was carefully extubated transferred to recovery room in stable condition.  All counts are correct.      Estimated Blood Loss (mL): Minimal    Complications: None    Specimens:   * No specimens in log *    Implants:  * No implants in log *      Drains: * No LDAs found *    Findings:  Infection Present At Time Of

## 2025-06-26 NOTE — PLAN OF CARE
Problem: Discharge Planning  Goal: Discharge to home or other facility with appropriate resources  Outcome: Progressing     Problem: Discharge Planning  Goal: Discharge to home or other facility with appropriate resources  Outcome: Progressing     Problem: Pain  Goal: Verbalizes/displays adequate comfort level or baseline comfort level  Outcome: Progressing     Problem: Pain  Goal: Verbalizes/displays adequate comfort level or baseline comfort level  Outcome: Progressing     Problem: Safety - Adult  Goal: Free from fall injury  Outcome: Progressing     Problem: Safety - Adult  Goal: Free from fall injury  Outcome: Progressing

## 2025-06-26 NOTE — PROGRESS NOTES
[facility] SURGERY CONSULT          Chief Complaint: rectal bleeding    History of Present Illness:    Mr. Kirby Andersen is a 50 y.o.  male presented to ER with long standing history of  painless rectal bleeding. He underwent a colonoscopic examination today which showed a partially obstructing growth at about 12 cm. Hence I was consulted.  Yet had another episode of bloody bowel movement      Past Medical History:   Past Medical History:   Diagnosis Date    Hand cramps 10/10/2020    Hypercholesterolemia     Hypertension     Impotence 10/10/2020    Increased frequency of urination 10/10/2020    Leukocytosis 10/10/2020    Migraines     Mild intermittent asthma without complication 01/18/2021    Morbid obesity with BMI of 50.0-59.9, adult (Formerly [facility])     Multiple acquired skin tags 10/10/2020    Osteoarthritis of right knee, unspecified osteoarthritis type 4/19/2023    Pain in both hands 10/10/2020    Paresthesia of left upper limb 10/10/2020    Seasonal allergic rhinitis 10/10/2020    Vitamin D deficiency 10/10/2020       Past Surgical History:   Past Surgical History:   Procedure Laterality Date    APPENDECTOMY  1993    COLONOSCOPY N/A 6/24/2025    COLONOSCOPY DIAGNOSTIC performed by Richard Talbert MD at [facility] ENDOSCOPY    PROSTATE SURGERY      UROLOGICAL SURGERY          Allergy:  Allergies   Allergen Reactions    Cephalexin Anaphylaxis and Shortness Of Breath       Social History:  reports that he has never smoked. He has never used smokeless tobacco. He reports that he does not drink alcohol and does not use drugs.     Family History:  Family History   Adopted: Yes        Current Medications:  Current Facility-Administered Medications:     [START ON 6/27/2025] levoFLOXacin (LEVAQUIN) 500 MG/100ML infusion 500 mg, 500 mg, IntraVENous, On Call, Kylie Neville MD    levoFLOXacin (LEVAQUIN) 500 MG/100ML infusion, , , ,     ibuprofen (ADVIL;MOTRIN) tablet 600 mg, 600 mg, Oral, Q8H PRN, Fay Ojeda MD, 600 mg

## 2025-06-26 NOTE — PROGRESS NOTES
[facility] SURGERY CONSULT          Chief Complaint: rectal bleeding    History of Present Illness:    Mr. Kirby Andersen is a 50 y.o.  male presented to ER with long standing history of  painless rectal bleeding. He underwent a colonoscopic examination today which showed a partially obstructing growth at about 12 cm. Hence I was consulted.  Yet had another episode of bloody bowel movement      Past Medical History:   Past Medical History:   Diagnosis Date    Hand cramps 10/10/2020    Hypercholesterolemia     Hypertension     Impotence 10/10/2020    Increased frequency of urination 10/10/2020    Leukocytosis 10/10/2020    Migraines     Mild intermittent asthma without complication 01/18/2021    Morbid obesity with BMI of 50.0-59.9, adult (Formerly [facility])     Multiple acquired skin tags 10/10/2020    Osteoarthritis of right knee, unspecified osteoarthritis type 4/19/2023    Pain in both hands 10/10/2020    Paresthesia of left upper limb 10/10/2020    Seasonal allergic rhinitis 10/10/2020    Vitamin D deficiency 10/10/2020       Past Surgical History:   Past Surgical History:   Procedure Laterality Date    APPENDECTOMY  1993    COLONOSCOPY N/A 6/24/2025    COLONOSCOPY DIAGNOSTIC performed by Richard Talbert MD at [facility] ENDOSCOPY    PROSTATE SURGERY      UROLOGICAL SURGERY          Allergy:  Allergies   Allergen Reactions    Cephalexin Anaphylaxis and Shortness Of Breath       Social History:  reports that he has never smoked. He has never used smokeless tobacco. He reports that he does not drink alcohol and does not use drugs.     Family History:  Family History   Adopted: Yes        Current Medications:  Current Facility-Administered Medications:     ibuprofen (ADVIL;MOTRIN) tablet 600 mg, 600 mg, Oral, Q8H PRN, Fay Ojeda MD, 600 mg at 06/25/25 1141    butalbital-acetaminophen-caffeine (FIORICET, ESGIC) per tablet 1 tablet, 1 tablet, Oral, Q4H PRN, Fay Ojeda MD    sodium chloride flush 0.9 % injection 5-40 mL, 5-40  wheezing, No cyanosis.  Cardiovascular: No chest pain, No palpitations, No bradycardia, No tachycardia, No peripheral edema, No syncope.  Gastrointestinal: No nausea,  No vomiting, No diarrhea, No constipation, No heartburn,  No abdominal pain, rectal bleeding.  Genitourinary: No dysuria, No hematuria, No change in urine stream, No urethral discharge, No lesions.  Hematology/Lymphatics: No bruising tendency, No bleeding tendency, No petechiae, No swollen lymph glands.  Endocrine: No excessive thirst, No polyuria, No cold intolerance, No heat intolerance, No excessive hunger.  Immunologic: Not immunocompromised, No recurrent fevers, No recurrent infections.  Musculoskeletal: No back pain, No neck pain, No joint pain, No muscle pain, No claudication, No decreased range of motion, No trauma.  Integumentary: No rash, No pruritus, No abrasions.  Neurologic: Alert and oriented X4, No abnormal balance, No headache, No confusion, No numbness, No tingling.  Psychiatric: No anxiety, No depression, No anders.    Physical Exam:     Vitals & Measurements:    Wt Readings from Last 3 Encounters:   06/23/25 126.1 kg (278 lb)   01/13/25 108.9 kg (240 lb)   11/17/24 126.1 kg (278 lb)     Temp Readings from Last 3 Encounters:   06/25/25 97.7 °F (36.5 °C) (Oral)   01/13/25 98.1 °F (36.7 °C) (Oral)   11/17/24 98.6 °F (37 °C) (Oral)     BP Readings from Last 3 Encounters:   06/25/25 126/79   01/13/25 117/80   11/17/24 125/66     Pulse Readings from Last 3 Encounters:   06/25/25 83   01/13/25 84   11/17/24 81      Ht Readings from Last 3 Encounters:   06/23/25 1.676 m (5' 6\")   01/13/25 1.689 m (5' 6.5\")   11/17/24 1.676 m (5' 6\")          General: well appearing, no acute distress  Head: Normal  Face: Nornal  HEENT: atraumatic, PERRLA, moist mucosa, normal pharynx, normal tonsils and adenoids, normal tongue, no fluid in sinuses  Neck: Trachea midline, no carotid bruit, no masses  Chest: Normal.  Respiratory: Normal chest wall expansion,

## 2025-06-26 NOTE — PROGRESS NOTES
Hospitalist Progress Note               Daily Progress Note: 6/26/2025      Hospital Day: 4     Chief complaint:   Chief Complaint   Patient presents with    Rectal Bleeding        Subjective:   Kirby Andersen is a 50 y.o.  male with PMHx of HTN presented  c/o 3 months of bleeding with each bowel movement, progressing,  now with blood cloths. Never had colonoscopy before, went to PCP but long wait for a procedure.   Hb stable, BP stable on admission  Pt also mentions dysuria with hesitancy to start urination and weak flow along with polynocturia    --------------------------    Patient seen and examined today with the nurse.  Discussed results of the biopsy.  Patient is very upset, however he understands our plan and would like to proceed.      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  - CEA unremarkable at 1  - General Surgery following; planning endoscopic ultrasound noted  - If EUS confirms rectal origin needs neoadjuvant chemotherapy and radiation before surgery, if colon origin proceed with surgery first  - Pathology report discussed with patient and his wife at the bedside    Lower GI bleed  Diverticulosis  Stable hemoglobin with no anemia  CTA abdomen pelvis with contrast on admission negative for any metastatic disease.  Status post colonoscopy on 6/24/2025    Dysuria, poly nocturia  PSA normal, no prostate enlargement according to the pelvic CT  Needs urology referral outpatient  Continue Flomax  UA with reflex, still not done    CODE STATUS full  DVT prophylaxis SCD    Barriers to discharge    Medications reviewed  Current Facility-Administered Medications   Medication Dose

## 2025-06-26 NOTE — ANESTHESIA PRE PROCEDURE
Department of Anesthesiology  Preprocedure Note       Name:  Kirby Andersen   Age:  50 y.o.  :  1975                                          MRN:  [id number]         Date:  2025      Surgeon: Surgeon(s):  Kylie Neville MD    Procedure: Procedure(s):  RECTAL EXAM UNDER ANESTHESIA    Medications prior to admission:   Prior to Admission medications    Medication Sig Start Date End Date Taking? Authorizing Provider   metoprolol succinate (TOPROL XL) 25 MG extended release tablet Take 1 tablet by mouth daily 25  Yes Lor Hood MD   omeprazole (PRILOSEC) 40 MG delayed release capsule Take 1 capsule by mouth every morning (before breakfast) 1/13/25 3/14/25  Yareli Spears MD   albuterol sulfate HFA (VENTOLIN HFA) 108 (90 Base) MCG/ACT inhaler Inhale 2 puffs into the lungs 4 times daily as needed for Wheezing 24   Lor Hood MD   dicyclomine (BENTYL) 20 MG tablet Take 1 tablet by mouth 4 times daily 24   Amy Dennis PA-C   ondansetron (ZOFRAN) 4 MG tablet Take 1 tablet by mouth 3 times daily as needed for Nausea or Vomiting 24   Amy Dennis PA-C   ibuprofen (IBU) 600 MG tablet Take 1 tablet by mouth 2 times daily as needed for Pain Do not take on empty stomach, daily use can cause increased risk for stomach ulcer and kidney failure. 24   Lor Hood MD   ketorolac (TORADOL) 10 MG tablet Take 1 tablet by mouth every 6 hours as needed for Pain 24   Willard Johnson MD   vitamin D (ERGOCALCIFEROL) 1.25 MG (54621 UT) CAPS capsule Take 1 capsule by mouth once a week 24   Lor Hood MD   Semaglutide-Weight Management (WEGOVY) 0.25 MG/0.5ML SOAJ SC injection Inject 0.25 mg into the skin every 7 days For 4 weeks then increase to 0.5 mg once a week 24   Lor Hood MD   celecoxib (CELEBREX) 200 MG capsule Take 1 capsule by mouth 1 (one) time if needed for Pain Take with food,take sparingly. 24

## 2025-06-26 NOTE — CARE COORDINATION
CM reviewed chart. Patient planned for procedure today.    Once clinically stable, current discharge plan is home.

## 2025-06-26 NOTE — PROGRESS NOTES
Hematology/Oncology   Progress Note    Patient: Kirby Andersen MRN: [id number]     YOB: 1975  Age: 50 y.o.  Sex: male      Admit Date: 6/23/2025    LOS: 2 days     Reason for Consult: colon mass    Subjective:     Patient resting bed, no acute distress. Family at the bedside.    THANGA 6/26 with Dr. Neville. Port placement today.           Objective:     Vitals:    06/26/25 1913 06/27/25 0149 06/27/25 0739 06/27/25 0851   BP: (!) 148/74 (!) 164/91 122/87 (!) 148/80   Pulse: 89 84 89 85   Resp: 18 18 16    Temp: 98.1 °F (36.7 °C) 98.1 °F (36.7 °C) 97.7 °F (36.5 °C) 97.7 °F (36.5 °C)   TempSrc: Axillary Oral Oral Oral   SpO2: 94% 93% 95% 95%   Weight:       Height:          Physical Exam:  Constitutional: -American male, not in any acute distress   Eyes: Sclerae anicteric. Conjunctivae no pallor.  ENMT: Oral mucosa is moist, no mucositis or petechiae.  Respiratory: Lungs are clear bilaterally.  Cardiovascular: Normal sinus rhythm  Abdomen: Soft, nontender. No guarding or rigidity. Bowel sounds present.  Extremities: No edema.  Skin: No petechiae; no skin rash.  Neurologic: Alert/oriented.     Lab/Data Review:  Recent Labs     06/25/25  0535 06/26/25  0630   WBC 8.2 7.5   HGB 14.2 14.4   HCT 39.0 41.1    309     No results for input(s): \"NA\", \"K\", \"CL\", \"CO2\", \"GLU\", \"BUN\", \"MG\", \"PHOS\", \"ALT\", \"INR\" in the last 72 hours.    Invalid input(s): \"CREA\", \"CA\", \"ALB\", \"TBIL\", \"TBILI\", \"SGOT\"    No results for input(s): \"PH\", \"PCO2\", \"PO2\", \"HCO3\", \"FIO2\" in the last 72 hours.  No results found for this or any previous visit (from the past 24 hours).     Surgical Pathology   FINAL PATHOLOGIC DIAGNOSIS        Rectosigmoid colon, biopsy:        Adenocarcinoma, moderately differentiated.     Assessment and Plan:     Rectosigmoid tumor   - CTA A/P showed no acute findings in the abdomen or pelvis  - Colonoscopy performed by GI 6/24 showing a partially obstructing tumor at 12 cm proximal to the  anus and in the rectosigmoid colon; pathology showed adenocarcinoma  - CT chest showed no evidence of pulmonary metastasis.  - CEA unremarkable at 1  - General Surgery following; s/p endoscopic ultrasound 6/26  - If EUS confirms rectal origin needs neoadjuvant chemotherapy and radiation before surgery, if colon origin proceed with surgery first; awaiting Surgeon report  - Pathology report discussed with patient and his wife at the bedside  - Recommend outpatient PET scan  - Radiation oncology evaluated patient, planning outpatient appointment     Lower GI bleed  - Hemoglobin stable, no anemia  - GI following, colonoscopy performed 6/24     Dysuria  - PSA unremarkable 0.7  - Primary team planning outpatient urology referral     Port placed today. No further hematology oncology work up while inpatient.  Patient to follow-up with Dr. Dye as outpatient, PET scan OP    We will sign off at this time, feel free to reach out with any questions or concerns.    This dictation was generated by voice recognition computer software. Although all attempts are made to edit the dictation for accuracy, there may be errors in the transcription that are not intended.     Signed By: ALBINA Bourne - CNP     June 27, 2025    Patient seen and examined.  Chart reviewed.  Discussed with the nurse practitioner Ms. Vidales.  Endoscopic ultrasound findings noted.  Will discuss with Dr. Neville.  Patient with rectal carcinoma.  PET scan as outpatient.  Discussed about total neoadjuvant treatment with patient and patient wife.  Patient seen by radiation oncology.  Status post port placement.  Follow-up with me in 1 week.

## 2025-06-26 NOTE — ANESTHESIA POSTPROCEDURE EVALUATION
Department of Anesthesiology  Postprocedure Note    Patient: Kirby Andersen  MRN: [id number]  YOB: 1975  Date of evaluation: 6/26/2025    Procedure Summary       Date: 06/26/25 Room / Location: [facility] MAIN OR 05 / [facility] MAIN OR    Anesthesia Start: 1615 Anesthesia Stop: 1728    Procedure: RECTAL EXAM UNDER ANESTHESIA (Anus) Diagnosis:       Malignant neoplasm of rectum (HCC)      (Malignant neoplasm of rectum (HCC) [C20])    Surgeons: Kylie Neville MD Responsible Provider: Simón Rahman MD    Anesthesia Type: MAC, TIVA ASA Status: 3            Anesthesia Type: No value filed.    Yamilka Phase I: Yamilka Score: 9    Yamilka Phase II: Yamilka Score: 10    Anesthesia Post Evaluation    Patient location during evaluation: PACU  Patient participation: complete - patient participated  Level of consciousness: sleepy but conscious  Pain score: 0  Airway patency: patent  Nausea & Vomiting: no nausea and no vomiting  Cardiovascular status: hemodynamically stable  Respiratory status: acceptable  Hydration status: stable  Multimodal analgesia pain management approach      No notable events documented.

## 2025-06-26 NOTE — PROGRESS NOTES
Hematology/Oncology   Progress Note    Patient: Kirby Andersen MRN: [id number]     YOB: 1975  Age: 50 y.o.  Sex: male      Admit Date: 6/23/2025    LOS: 1 day     Reason for Consult: colon mass    Subjective:     Patient resting bed, no acute distress. Family at the bedside. Pathology report reviewed with patient and wife.  EUS today with Dr. Neville. Still complains of blood coming from the anus even without having a bowel movement.    Objective:     Vitals:    06/25/25 0949 06/25/25 2104 06/26/25 0158 06/26/25 0808   BP: (!) 143/88 126/79 119/86 (!) 143/89   Pulse: 78 83 89 78   Resp:  18 18 16   Temp:  97.7 °F (36.5 °C) 97.9 °F (36.6 °C) 97.7 °F (36.5 °C)   TempSrc:  Oral Oral Oral   SpO2:  95% 94% 94%   Weight:       Height:          Physical Exam:  Constitutional: -American male, not in any acute distress   Eyes: Sclerae anicteric. Conjunctivae no pallor.  ENMT: Oral mucosa is moist, no mucositis or petechiae.  Respiratory: Lungs are clear bilaterally.  Cardiovascular: Normal sinus rhythm  Abdomen: Soft, nontender. No guarding or rigidity. Bowel sounds present.  Extremities: No edema, cyanosis or clubbing.  Skin: No petechiae; no skin rash.  Neurologic: Alert/oriented; no focal neurological deficits.     Lab/Data Review:  Recent Labs     06/24/25  0730 06/25/25  0535 06/26/25  0630   WBC 7.5 8.2 7.5   HGB 14.5 14.2 14.4   HCT 40.8 39.0 41.1    296 309     Recent Labs     06/23/25  1317      K 4.2      CO2 27   BUN 12   ALT 25   INR 1.0     No results for input(s): \"PH\", \"PCO2\", \"PO2\", \"HCO3\", \"FIO2\" in the last 72 hours.  Recent Results (from the past 24 hours)   CBC    Collection Time: 06/26/25  6:30 AM   Result Value Ref Range    WBC 7.5 4.1 - 11.1 K/uL    RBC 5.04 4.10 - 5.70 M/uL    Hemoglobin 14.4 12.1 - 17.0 g/dL    Hematocrit 41.1 36.6 - 50.3 %    MCV 81.5 80.0 - 99.0 FL    MCH 28.6 26.0 - 34.0 PG    MCHC 35.0 30.0 - 36.5 g/dL    RDW 12.7 11.5 - 14.5 %

## 2025-06-27 ENCOUNTER — APPOINTMENT (OUTPATIENT)
Facility: HOSPITAL | Age: 50
DRG: 357 | End: 2025-06-27
Payer: COMMERCIAL

## 2025-06-27 ENCOUNTER — ANESTHESIA (OUTPATIENT)
Facility: HOSPITAL | Age: 50
End: 2025-06-27
Payer: MEDICARE

## 2025-06-27 ENCOUNTER — ANESTHESIA EVENT (OUTPATIENT)
Facility: HOSPITAL | Age: 50
End: 2025-06-27
Payer: MEDICARE

## 2025-06-27 VITALS
BODY MASS INDEX: 44.68 KG/M2 | HEIGHT: 66 IN | WEIGHT: 278 LBS | DIASTOLIC BLOOD PRESSURE: 72 MMHG | RESPIRATION RATE: 17 BRPM | OXYGEN SATURATION: 93 % | SYSTOLIC BLOOD PRESSURE: 138 MMHG | TEMPERATURE: 97.9 F | HEART RATE: 95 BPM

## 2025-06-27 PROCEDURE — 6360000002 HC RX W HCPCS: Performed by: NURSE ANESTHETIST, CERTIFIED REGISTERED

## 2025-06-27 PROCEDURE — C1788 PORT, INDWELLING, IMP: HCPCS | Performed by: SURGERY

## 2025-06-27 PROCEDURE — 2709999900 HC NON-CHARGEABLE SUPPLY: Performed by: SURGERY

## 2025-06-27 PROCEDURE — 6370000000 HC RX 637 (ALT 250 FOR IP)

## 2025-06-27 PROCEDURE — 71045 X-RAY EXAM CHEST 1 VIEW: CPT

## 2025-06-27 PROCEDURE — 3700000001 HC ADD 15 MINUTES (ANESTHESIA): Performed by: SURGERY

## 2025-06-27 PROCEDURE — 2580000003 HC RX 258: Performed by: NURSE ANESTHETIST, CERTIFIED REGISTERED

## 2025-06-27 PROCEDURE — 7100000001 HC PACU RECOVERY - ADDTL 15 MIN: Performed by: SURGERY

## 2025-06-27 PROCEDURE — 2500000003 HC RX 250 WO HCPCS: Performed by: SURGERY

## 2025-06-27 PROCEDURE — 02HV33Z INSERTION OF INFUSION DEVICE INTO SUPERIOR VENA CAVA, PERCUTANEOUS APPROACH: ICD-10-PCS | Performed by: SURGERY

## 2025-06-27 PROCEDURE — 3600000002 HC SURGERY LEVEL 2 BASE: Performed by: SURGERY

## 2025-06-27 PROCEDURE — 3700000000 HC ANESTHESIA ATTENDED CARE: Performed by: SURGERY

## 2025-06-27 PROCEDURE — 2500000003 HC RX 250 WO HCPCS

## 2025-06-27 PROCEDURE — 0JH60WZ INSERTION OF TOTALLY IMPLANTABLE VASCULAR ACCESS DEVICE INTO CHEST SUBCUTANEOUS TISSUE AND FASCIA, OPEN APPROACH: ICD-10-PCS | Performed by: SURGERY

## 2025-06-27 PROCEDURE — 7100000000 HC PACU RECOVERY - FIRST 15 MIN: Performed by: SURGERY

## 2025-06-27 PROCEDURE — 76000 FLUOROSCOPY <1 HR PHYS/QHP: CPT

## 2025-06-27 PROCEDURE — 6360000002 HC RX W HCPCS: Performed by: SURGERY

## 2025-06-27 PROCEDURE — 3600000012 HC SURGERY LEVEL 2 ADDTL 15MIN: Performed by: SURGERY

## 2025-06-27 DEVICE — PORT INFUS 8FR SIL PWR INJ ATTCH POLYUR CATH INTMED KT: Type: IMPLANTABLE DEVICE | Site: CHEST | Status: FUNCTIONAL

## 2025-06-27 RX ORDER — HYDROMORPHONE HYDROCHLORIDE 1 MG/ML
0.5 INJECTION, SOLUTION INTRAMUSCULAR; INTRAVENOUS; SUBCUTANEOUS EVERY 5 MIN PRN
Status: DISCONTINUED | OUTPATIENT
Start: 2025-06-27 | End: 2025-06-27 | Stop reason: HOSPADM

## 2025-06-27 RX ORDER — DIPHENHYDRAMINE HYDROCHLORIDE 50 MG/ML
12.5 INJECTION, SOLUTION INTRAMUSCULAR; INTRAVENOUS
Status: DISCONTINUED | OUTPATIENT
Start: 2025-06-27 | End: 2025-06-27 | Stop reason: HOSPADM

## 2025-06-27 RX ORDER — OXYCODONE HYDROCHLORIDE 5 MG/1
5 TABLET ORAL PRN
Status: DISCONTINUED | OUTPATIENT
Start: 2025-06-27 | End: 2025-06-27 | Stop reason: HOSPADM

## 2025-06-27 RX ORDER — VANCOMYCIN HYDROCHLORIDE 1 G/20ML
1000 INJECTION, POWDER, LYOPHILIZED, FOR SOLUTION INTRAVENOUS
Status: CANCELLED | OUTPATIENT
Start: 2025-06-27 | End: 2025-06-27

## 2025-06-27 RX ORDER — SODIUM CHLORIDE, SODIUM LACTATE, POTASSIUM CHLORIDE, CALCIUM CHLORIDE 600; 310; 30; 20 MG/100ML; MG/100ML; MG/100ML; MG/100ML
INJECTION, SOLUTION INTRAVENOUS ONCE
Status: DISCONTINUED | OUTPATIENT
Start: 2025-06-27 | End: 2025-06-27 | Stop reason: HOSPADM

## 2025-06-27 RX ORDER — DEXTROSE MONOHYDRATE 100 MG/ML
INJECTION, SOLUTION INTRAVENOUS CONTINUOUS PRN
Status: DISCONTINUED | OUTPATIENT
Start: 2025-06-27 | End: 2025-06-27 | Stop reason: HOSPADM

## 2025-06-27 RX ORDER — FENTANYL CITRATE 50 UG/ML
INJECTION, SOLUTION INTRAMUSCULAR; INTRAVENOUS
Status: DISCONTINUED | OUTPATIENT
Start: 2025-06-27 | End: 2025-06-27 | Stop reason: SDUPTHER

## 2025-06-27 RX ORDER — CLINDAMYCIN PHOSPHATE 600 MG/50ML
600 INJECTION, SOLUTION INTRAVENOUS
Status: CANCELLED | OUTPATIENT
Start: 2025-06-27 | End: 2025-06-27

## 2025-06-27 RX ORDER — SODIUM CHLORIDE, SODIUM LACTATE, POTASSIUM CHLORIDE, CALCIUM CHLORIDE 600; 310; 30; 20 MG/100ML; MG/100ML; MG/100ML; MG/100ML
INJECTION, SOLUTION INTRAVENOUS
Status: DISCONTINUED | OUTPATIENT
Start: 2025-06-27 | End: 2025-06-27 | Stop reason: SDUPTHER

## 2025-06-27 RX ORDER — KETOROLAC TROMETHAMINE 30 MG/ML
INJECTION, SOLUTION INTRAMUSCULAR; INTRAVENOUS
Status: DISCONTINUED | OUTPATIENT
Start: 2025-06-27 | End: 2025-06-27 | Stop reason: SDUPTHER

## 2025-06-27 RX ORDER — HYDRALAZINE HYDROCHLORIDE 20 MG/ML
10 INJECTION INTRAMUSCULAR; INTRAVENOUS
Status: DISCONTINUED | OUTPATIENT
Start: 2025-06-27 | End: 2025-06-27 | Stop reason: HOSPADM

## 2025-06-27 RX ORDER — NALOXONE HYDROCHLORIDE 0.4 MG/ML
INJECTION, SOLUTION INTRAMUSCULAR; INTRAVENOUS; SUBCUTANEOUS PRN
Status: DISCONTINUED | OUTPATIENT
Start: 2025-06-27 | End: 2025-06-27 | Stop reason: HOSPADM

## 2025-06-27 RX ORDER — LIDOCAINE 4 G/G
1 PATCH TOPICAL AS NEEDED
Status: DISCONTINUED | OUTPATIENT
Start: 2025-06-27 | End: 2025-06-27 | Stop reason: HOSPADM

## 2025-06-27 RX ORDER — DEXAMETHASONE SODIUM PHOSPHATE 4 MG/ML
INJECTION, SOLUTION INTRA-ARTICULAR; INTRALESIONAL; INTRAMUSCULAR; INTRAVENOUS; SOFT TISSUE
Status: DISCONTINUED | OUTPATIENT
Start: 2025-06-27 | End: 2025-06-27 | Stop reason: SDUPTHER

## 2025-06-27 RX ORDER — GLUCAGON 1 MG/ML
1 KIT INJECTION PRN
Status: DISCONTINUED | OUTPATIENT
Start: 2025-06-27 | End: 2025-06-27 | Stop reason: HOSPADM

## 2025-06-27 RX ORDER — SODIUM CHLORIDE 0.9 % (FLUSH) 0.9 %
5-40 SYRINGE (ML) INJECTION PRN
Status: DISCONTINUED | OUTPATIENT
Start: 2025-06-27 | End: 2025-06-27 | Stop reason: HOSPADM

## 2025-06-27 RX ORDER — FENTANYL CITRATE 0.05 MG/ML
50 INJECTION, SOLUTION INTRAMUSCULAR; INTRAVENOUS EVERY 5 MIN PRN
Status: DISCONTINUED | OUTPATIENT
Start: 2025-06-27 | End: 2025-06-27 | Stop reason: HOSPADM

## 2025-06-27 RX ORDER — ONDANSETRON 2 MG/ML
4 INJECTION INTRAMUSCULAR; INTRAVENOUS
Status: DISCONTINUED | OUTPATIENT
Start: 2025-06-27 | End: 2025-06-27 | Stop reason: HOSPADM

## 2025-06-27 RX ORDER — MEPERIDINE HYDROCHLORIDE 25 MG/ML
12.5 INJECTION INTRAMUSCULAR; INTRAVENOUS; SUBCUTANEOUS EVERY 5 MIN PRN
Status: DISCONTINUED | OUTPATIENT
Start: 2025-06-27 | End: 2025-06-27 | Stop reason: HOSPADM

## 2025-06-27 RX ORDER — SODIUM CHLORIDE 0.9 % (FLUSH) 0.9 %
5-40 SYRINGE (ML) INJECTION EVERY 12 HOURS SCHEDULED
Status: DISCONTINUED | OUTPATIENT
Start: 2025-06-27 | End: 2025-06-27 | Stop reason: HOSPADM

## 2025-06-27 RX ORDER — LABETALOL HYDROCHLORIDE 5 MG/ML
10 INJECTION, SOLUTION INTRAVENOUS
Status: DISCONTINUED | OUTPATIENT
Start: 2025-06-27 | End: 2025-06-27 | Stop reason: HOSPADM

## 2025-06-27 RX ORDER — GLYCOPYRROLATE 0.2 MG/ML
INJECTION INTRAMUSCULAR; INTRAVENOUS
Status: DISCONTINUED | OUTPATIENT
Start: 2025-06-27 | End: 2025-06-27 | Stop reason: SDUPTHER

## 2025-06-27 RX ORDER — PROPOFOL 10 MG/ML
INJECTION, EMULSION INTRAVENOUS
Status: DISCONTINUED | OUTPATIENT
Start: 2025-06-27 | End: 2025-06-27 | Stop reason: SDUPTHER

## 2025-06-27 RX ORDER — HEPARIN SODIUM 1000 [USP'U]/ML
INJECTION, SOLUTION INTRAVENOUS; SUBCUTANEOUS PRN
Status: DISCONTINUED | OUTPATIENT
Start: 2025-06-27 | End: 2025-06-27 | Stop reason: HOSPADM

## 2025-06-27 RX ORDER — LORAZEPAM 2 MG/ML
0.5 INJECTION INTRAMUSCULAR
Status: DISCONTINUED | OUTPATIENT
Start: 2025-06-27 | End: 2025-06-27 | Stop reason: HOSPADM

## 2025-06-27 RX ORDER — SODIUM CHLORIDE 9 MG/ML
INJECTION, SOLUTION INTRAVENOUS PRN
Status: DISCONTINUED | OUTPATIENT
Start: 2025-06-27 | End: 2025-06-27 | Stop reason: HOSPADM

## 2025-06-27 RX ORDER — METOCLOPRAMIDE HYDROCHLORIDE 5 MG/ML
10 INJECTION INTRAMUSCULAR; INTRAVENOUS
Status: DISCONTINUED | OUTPATIENT
Start: 2025-06-27 | End: 2025-06-27 | Stop reason: HOSPADM

## 2025-06-27 RX ORDER — VANCOMYCIN HYDROCHLORIDE 1 G/20ML
INJECTION, POWDER, LYOPHILIZED, FOR SOLUTION INTRAVENOUS
Status: DISCONTINUED | OUTPATIENT
Start: 2025-06-27 | End: 2025-06-27 | Stop reason: SDUPTHER

## 2025-06-27 RX ORDER — IPRATROPIUM BROMIDE AND ALBUTEROL SULFATE 2.5; .5 MG/3ML; MG/3ML
1 SOLUTION RESPIRATORY (INHALATION)
Status: DISCONTINUED | OUTPATIENT
Start: 2025-06-27 | End: 2025-06-27 | Stop reason: HOSPADM

## 2025-06-27 RX ORDER — CLINDAMYCIN PHOSPHATE 150 MG/ML
INJECTION, SOLUTION INTRAVENOUS
Status: DISCONTINUED | OUTPATIENT
Start: 2025-06-27 | End: 2025-06-27 | Stop reason: SDUPTHER

## 2025-06-27 RX ORDER — ONDANSETRON 2 MG/ML
INJECTION INTRAMUSCULAR; INTRAVENOUS
Status: DISCONTINUED | OUTPATIENT
Start: 2025-06-27 | End: 2025-06-27 | Stop reason: SDUPTHER

## 2025-06-27 RX ORDER — MIDAZOLAM HYDROCHLORIDE 1 MG/ML
INJECTION, SOLUTION INTRAMUSCULAR; INTRAVENOUS
Status: DISCONTINUED | OUTPATIENT
Start: 2025-06-27 | End: 2025-06-27 | Stop reason: SDUPTHER

## 2025-06-27 RX ORDER — OXYCODONE HYDROCHLORIDE 5 MG/1
10 TABLET ORAL PRN
Status: DISCONTINUED | OUTPATIENT
Start: 2025-06-27 | End: 2025-06-27 | Stop reason: HOSPADM

## 2025-06-27 RX ADMIN — DEXAMETHASONE SODIUM PHOSPHATE 4 MG: 4 INJECTION, SOLUTION INTRA-ARTICULAR; INTRALESIONAL; INTRAMUSCULAR; INTRAVENOUS; SOFT TISSUE at 09:42

## 2025-06-27 RX ADMIN — CLINDAMYCIN PHOSPHATE 600 MG: 150 INJECTION, SOLUTION INTRAMUSCULAR; INTRAVENOUS at 09:31

## 2025-06-27 RX ADMIN — MIDAZOLAM 1 MG: 1 INJECTION INTRAMUSCULAR; INTRAVENOUS at 09:18

## 2025-06-27 RX ADMIN — SODIUM CHLORIDE, PRESERVATIVE FREE 10 ML: 5 INJECTION INTRAVENOUS at 11:22

## 2025-06-27 RX ADMIN — SODIUM CHLORIDE, POTASSIUM CHLORIDE, SODIUM LACTATE AND CALCIUM CHLORIDE: 600; 310; 30; 20 INJECTION, SOLUTION INTRAVENOUS at 09:18

## 2025-06-27 RX ADMIN — PROPOFOL 25 MG: 10 INJECTION, EMULSION INTRAVENOUS at 09:50

## 2025-06-27 RX ADMIN — MIDAZOLAM 1 MG: 1 INJECTION INTRAMUSCULAR; INTRAVENOUS at 09:31

## 2025-06-27 RX ADMIN — PROPOFOL 50 MG: 10 INJECTION, EMULSION INTRAVENOUS at 10:11

## 2025-06-27 RX ADMIN — KETOROLAC TROMETHAMINE 30 MG: 30 INJECTION, SOLUTION INTRAMUSCULAR at 10:12

## 2025-06-27 RX ADMIN — METOPROLOL SUCCINATE 25 MG: 25 TABLET, EXTENDED RELEASE ORAL at 11:20

## 2025-06-27 RX ADMIN — LIDOCAINE HYDROCHLORIDE 60 MG: 20 INJECTION, SOLUTION EPIDURAL; INFILTRATION; INTRACAUDAL; PERINEURAL at 09:34

## 2025-06-27 RX ADMIN — FENTANYL CITRATE 25 MCG: 50 INJECTION INTRAMUSCULAR; INTRAVENOUS at 10:29

## 2025-06-27 RX ADMIN — PROPOFOL 25 MG: 10 INJECTION, EMULSION INTRAVENOUS at 09:34

## 2025-06-27 RX ADMIN — PROPOFOL 25 MG: 10 INJECTION, EMULSION INTRAVENOUS at 09:41

## 2025-06-27 RX ADMIN — VANCOMYCIN HYDROCHLORIDE 1000 MG: 1 INJECTION, POWDER, LYOPHILIZED, FOR SOLUTION INTRAVENOUS at 10:09

## 2025-06-27 RX ADMIN — TAMSULOSIN HYDROCHLORIDE 0.4 MG: 0.4 CAPSULE ORAL at 11:20

## 2025-06-27 RX ADMIN — FENTANYL CITRATE 25 MCG: 50 INJECTION INTRAMUSCULAR; INTRAVENOUS at 10:11

## 2025-06-27 RX ADMIN — FENTANYL CITRATE 25 MCG: 50 INJECTION INTRAMUSCULAR; INTRAVENOUS at 09:50

## 2025-06-27 RX ADMIN — FENTANYL CITRATE 25 MCG: 50 INJECTION INTRAMUSCULAR; INTRAVENOUS at 09:41

## 2025-06-27 RX ADMIN — ONDANSETRON 4 MG: 2 INJECTION, SOLUTION INTRAMUSCULAR; INTRAVENOUS at 09:42

## 2025-06-27 RX ADMIN — GLYCOPYRROLATE 0.1 MG: 0.2 INJECTION, SOLUTION INTRAMUSCULAR; INTRAVENOUS at 09:36

## 2025-06-27 RX ADMIN — PROPOFOL 25 MG: 10 INJECTION, EMULSION INTRAVENOUS at 09:54

## 2025-06-27 ASSESSMENT — PAIN - FUNCTIONAL ASSESSMENT: PAIN_FUNCTIONAL_ASSESSMENT: NONE - DENIES PAIN

## 2025-06-27 ASSESSMENT — PAIN SCALES - GENERAL: PAINLEVEL_OUTOF10: 3

## 2025-06-27 NOTE — CONSULTS
[place], [place]    Radiation Oncology Consultation    Kirby Andersen  [id number]  1975     Diagnosis   1. Adenoca rectum Mod diff, stage pending.       AJCC Staging has been reviewed.  History of Present Illness   Mr. Andersen is a 50 y.o. male seen in consultation at the request of Dr. Bre baptiste. provider found to assess the role of radiation for his above diagnosis.    his oncologic history is detailed below:  Pleasant 51 yo gentleman, c/o painless rectal bleeding for several months.  Admitted for workup.   Colonoscopy showed partial circumferential mass at 12 cm from anus.   Bx showed mod diff adenoca.  EUS yesterday, results pending.  CT chest negative for mets. CEA =1.  Scheduled for port placement today.      Symptomatically, rectal bleeding  From a performance status standpoint, he is able to perform ADA works at [employer]. Mr. Andersen denies a history of inflammatory bowel disease, scleroderma or other collagen vascular diseases, pacemaker/AICD, or history of prior irradiation.    Review of Systems:  A complete review of systems was obtained and negative except as described above.    Allergies and Medications     Allergies   Allergen Reactions    Cephalexin Anaphylaxis and Shortness Of Breath       Current Outpatient Medications   Medication Instructions    albuterol sulfate HFA (VENTOLIN HFA) 108 (90 Base) MCG/ACT inhaler 2 puffs, Inhalation, 4 TIMES DAILY PRN    celecoxib (CELEBREX) 200 mg, Oral, Once as needed, Take with food,take sparingly.    dicyclomine (BENTYL) 20 mg, Oral, 4 TIMES DAILY    ibuprofen (IBU) 600 mg, Oral, 2 TIMES DAILY PRN, Do not take on empty stomach, daily use can cause increased risk for stomach ulcer and kidney failure.    ketorolac (TORADOL) 10 mg, Oral, EVERY 6 HOURS PRN    metoprolol succinate (TOPROL XL) 25 mg, Oral, DAILY    omeprazole (PRILOSEC) 40 MG delayed release capsule TAKE 1 CAPSULE BY MOUTH DAILY. TAKE 30 MINUTES BEFORE EATING

## 2025-06-27 NOTE — CARE COORDINATION
CM received request to provide resources for colorectal cancer. CM provided information for :     -Colorectal Cancer [place] website   -Coloncancer coalition website   -Blue note financial information    CM also provided name of oncologist (Dr Dye at [facility]) and Yamil Estrella [facility] Radiation Oncology as well as addresses and contact info for both MD.     CM provided emotional support, patient very overwhelmed with diagnosis. Patient very appreciative, wife at bedside. Encouraged patient to seek SW assistance at [facility] for more resources.

## 2025-06-27 NOTE — PLAN OF CARE
Problem: Discharge Planning  Goal: Discharge to home or other facility with appropriate resources  Outcome: Progressing     Problem: Pain  Goal: Verbalizes/displays adequate comfort level or baseline comfort level  6/27/2025 0745 by Beverley Collins RN  Outcome: Progressing  6/26/2025 2157 by LILLIE Friedman RN  Outcome: Progressing     Problem: Safety - Adult  Goal: Free from fall injury  6/27/2025 0745 by Beverley Collins RN  Outcome: Progressing  6/26/2025 2157 by LILLIE Friedman RN  Outcome: Progressing

## 2025-06-27 NOTE — CARE COORDINATION
Patient discharging home today. Spoke to him about transport through insurance and that he would need to call and get the benefit setup through insurance. Patient understands.    Transition of Care Plan:    RUR: 6%  Prior Level of Functioning: ind  Disposition: ind  [place]: today  If SNF or IPR: Date FOC offered: na  Date FOC received: na  Accepting facility: na  Date authorization started with reference number: na  Date authorization received and expires: na  Follow up appointments:   DME needed: na  Transportation at discharge: TBD  IM/IMM Medicare/ letter given: 6/27  Is patient a  and connected with [facility]? na  If yes, was  transfer form completed and [facility] notified? na  Caregiver Contact: na  Discharge Caregiver contacted prior to discharge? na  Care Conference needed? na  Barriers to discharge: na

## 2025-06-27 NOTE — ANESTHESIA POSTPROCEDURE EVALUATION
Department of Anesthesiology  Postprocedure Note    Patient: Kirby Andersen  MRN: [id number]  YOB: 1975  Date of evaluation: 6/27/2025    Procedure Summary       Date: 06/27/25 Room / Location: [facility] MAIN OR 02 / [facility] MAIN OR    Anesthesia Start: 0918 Anesthesia Stop: 1043    Procedure: PORT INSERTION (Chest) Diagnosis:       Malignant neoplasm of rectum (HCC)      (Malignant neoplasm of rectum (HCC) [C20])    Surgeons: Kylie Neville MD Responsible Provider: Blanca Gutiérrez MD    Anesthesia Type: TIVA, MAC ASA Status: 3            Anesthesia Type: No value filed.    Yamilka Phase I: Yamilka Score: 10    Yamilka Phase II: Yamilka Score: 10    Anesthesia Post Evaluation    Patient location during evaluation: PACU  Patient participation: complete - patient participated  Level of consciousness: awake  Airway patency: patent  Nausea & Vomiting: no nausea and no vomiting  Cardiovascular status: hemodynamically stable  Respiratory status: acceptable  Hydration status: euvolemic  Pain management: adequate    No notable events documented.

## 2025-06-27 NOTE — PLAN OF CARE
Problem: Discharge Planning  Goal: Discharge to home or other facility with appropriate resources  6/26/2025 1054 by Beverley Collins, RN  Outcome: Progressing  Flowsheets (Taken 6/26/2025 0808)  Discharge to home or other facility with appropriate resources: Identify barriers to discharge with patient and caregiver     Problem: Pain  Goal: Verbalizes/displays adequate comfort level or baseline comfort level  6/26/2025 2157 by LILLIE Friedman, HARSH  Outcome: Progressing  6/26/2025 1054 by Beverley Collins, RN  Outcome: Progressing     Problem: Safety - Adult  Goal: Free from fall injury  6/26/2025 2157 by LILLIE Friedman RN  Outcome: Progressing  6/26/2025 1054 by Beverley Collins RN  Outcome: Progressing

## 2025-06-27 NOTE — PROGRESS NOTES
[facility] [address] [address]  Phone: [phone]             June 27, 2025    Patient: Kirby Andersen   YOB: 1975   Date of Visit: 6/23/2025       To Whom It May Concern:    Kirby Andersen was seen and treated in our facility  beginning 6/23/2025 until 6/27/2025. He should not return to work until at least July 15th 2025.      Sincerely,       Fay Ojeda MD         Signature:________ARI__________________________

## 2025-06-27 NOTE — DISCHARGE SUMMARY
Physician Discharge Summary     Patient ID:    Kirby Andersen  [id number]  50 y.o.  1975    Admit date: 6/23/2025    Discharge date : 6/27/2025    Final Diagnoses:   Rectal bleeding  Adenocarcinoma rectal    Reason for Hospitalization: AS ABOVE    Hospital Course:     Kirby Andersen is a 50 y.o.  male with PMHx of HTN presented  c/o 3 months of bleeding with each bowel movement, progressing,  now with blood cloths. Never had colonoscopy before, went to PCP but long wait for a procedure.   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  - CEA unremarkable at 1  - General Surgery following; planning endoscopic ultrasound noted  - EUS confirmed rectal origin, so patient needs neoadjuvant chemotherapy and radiation before surgery, if colon origin proceed with surgery first  - Pathology report discussed with patient and his wife at the bedside  Patient to follow up with oncology and radiation oncology outpatient     Lower GI bleed 2/2 rectal mass  Diverticulosis  Stable hemoglobin with no anemia  CTA abdomen pelvis with contrast on admission negative for any metastatic disease.  Status post colonoscopy on 6/24/2025     Dysuria, poly nocturia  PSA normal, no prostate enlargement according to the pelvic CT  Needs urology referral outpatient from PCP if continues to have symptoms  Continue Flomax  UA with reflex, still not done    Discharge Medications:      Medication List        CHANGE how you take these medications      omeprazole 40 MG delayed release

## 2025-06-27 NOTE — ANESTHESIA PRE PROCEDURE
Department of Anesthesiology  Preprocedure Note       Name:  Kirby Andersen   Age:  50 y.o.  :  1975                                          MRN:  [id number]         Date:  2025      Surgeon: Surgeon(s):  Kylie Neville MD    Procedure: Procedure(s):  PORT INSERTION    Medications prior to admission:   Prior to Admission medications    Medication Sig Start Date End Date Taking? Authorizing Provider   metoprolol succinate (TOPROL XL) 25 MG extended release tablet Take 1 tablet by mouth daily 25  Yes Lor Hood MD   omeprazole (PRILOSEC) 40 MG delayed release capsule Take 1 capsule by mouth every morning (before breakfast) 1/13/25 3/14/25  Yareli Spears MD   albuterol sulfate HFA (VENTOLIN HFA) 108 (90 Base) MCG/ACT inhaler Inhale 2 puffs into the lungs 4 times daily as needed for Wheezing 24   Lor Hood MD   dicyclomine (BENTYL) 20 MG tablet Take 1 tablet by mouth 4 times daily 24   Amy Dennis PA-C   ondansetron (ZOFRAN) 4 MG tablet Take 1 tablet by mouth 3 times daily as needed for Nausea or Vomiting 24   Amy Dennis PA-C   ibuprofen (IBU) 600 MG tablet Take 1 tablet by mouth 2 times daily as needed for Pain Do not take on empty stomach, daily use can cause increased risk for stomach ulcer and kidney failure. 24   Lor Hood MD   ketorolac (TORADOL) 10 MG tablet Take 1 tablet by mouth every 6 hours as needed for Pain 24   Willard Johnson MD   vitamin D (ERGOCALCIFEROL) 1.25 MG (89682 UT) CAPS capsule Take 1 capsule by mouth once a week 24   Lor Hood MD   Semaglutide-Weight Management (WEGOVY) 0.25 MG/0.5ML SOAJ SC injection Inject 0.25 mg into the skin every 7 days For 4 weeks then increase to 0.5 mg once a week 24   Lor Hood MD   celecoxib (CELEBREX) 200 MG capsule Take 1 capsule by mouth 1 (one) time if needed for Pain Take with food,take sparingly. 24   Lor Hood,

## 2025-06-28 NOTE — BRIEF OP NOTE
Operative Note      Patient: Kirby Andersen  YOB: 1975  MRN: [id number]    Date of Procedure: 6/27/2025    Pre-Op Diagnosis Codes:      * Malignant neoplasm of rectum (HCC) [C20]    Post-Op Diagnosis: Same       Procedure:  Right subclavian Port-A-Cath placement under fluoroscopy    X-ray interpretation    Surgeon(s):  Kylie Neville MD    Assistant:  Surgical Assistant: Andres Decker    Anesthesia: Monitor Anesthesia Care/local    Anesthesiologist: Dr. Gutiérrez    CRNA: Mr. Darby    Indication: Recent diagnosis of rectal cancer needs neoadjuvant chemoRT for which he needs a Port-A-Cath for infusion of chemotherapy.    Procedure:  Patient was taken to the operating room.  Patient was laid supine.  Patient received prophylactic dose of antibiotic.  Patient had SCD applied to both lower extremities.  After IV sedation was administered bilateral chest and neck area was prepped and draped in the usual sterile fashion.  Timeout was completed.  Local anesthesia was infiltrated.  Patient was carefully placed in Trendelenburg position.  I was able to access the right subclavian vein through right subclavian approach.  The needle was introduced and I was able to access the right subclavian vein.  The guidewire was introduced through the needle into the right subclavian vein under fluoroscopic guidance.  The needle was withdrawn.  The right subclavian pocket was created after infiltrating with more anesthesia.  I went and placed the port in the right subclavian pocket where it was secured with 2-0 Prolene stitches.  Then I tunneled the catheter and under fluoroscopic guidance I cut the catheter at appropriate level and clamp the distal aspect.  Then I went and introduced the introducer with the sheath over the guidewire under fluoroscopic guidance and advanced the introducer sheath.  The guidewire and the introducer were removed leaving the sheath in place.  Then I went in past the catheter

## 2025-07-01 ENCOUNTER — TRANSCRIBE ORDERS (OUTPATIENT)
Facility: HOSPITAL | Age: 50
End: 2025-07-01

## 2025-07-01 DIAGNOSIS — C20 MALIGNANT NEOPLASM OF RECTUM (HCC): Primary | ICD-10-CM

## 2025-07-02 ENCOUNTER — HOSPITAL ENCOUNTER (OUTPATIENT)
Facility: HOSPITAL | Age: 50
Discharge: HOME | End: 2025-07-05
Payer: COMMERCIAL

## 2025-07-02 ENCOUNTER — CLINICAL DOCUMENTATION (OUTPATIENT)
Facility: HOSPITAL | Age: 50
End: 2025-07-02

## 2025-07-02 VITALS
WEIGHT: 310.3 LBS | BODY MASS INDEX: 50.08 KG/M2 | RESPIRATION RATE: 20 BRPM | SYSTOLIC BLOOD PRESSURE: 131 MMHG | TEMPERATURE: 97.2 F | OXYGEN SATURATION: 95 % | DIASTOLIC BLOOD PRESSURE: 78 MMHG | HEART RATE: 84 BPM

## 2025-07-02 DIAGNOSIS — C20 RECTAL CANCER (HCC): Primary | ICD-10-CM

## 2025-07-02 PROCEDURE — 99204 OFFICE O/P NEW MOD 45 MIN: CPT

## 2025-07-02 ASSESSMENT — PAIN DESCRIPTION - DESCRIPTORS: DESCRIPTORS: ACHING

## 2025-07-02 ASSESSMENT — PAIN DESCRIPTION - LOCATION: LOCATION: RECTUM

## 2025-07-02 ASSESSMENT — PAIN SCALES - GENERAL: PAINLEVEL_OUTOF10: 6

## 2025-07-02 NOTE — PROGRESS NOTES
NCCN Distress Thermometer    Radiation Oncology at [facility]    Date Screening Completed: 7/2/2025    Screening Declined:  [] Yes    Number that best describes how much distress you've experienced in the past week, including today?  0 [] - No distress 1 []      2 []      3 []      4 []       5 []       6 []      7 []      8 []      9 [x]       10 [] - Extreme distress    PROBLEM LIST  Have you had concerns about any of the items below in the past week, including today?      Physical Concerns Practical Concerns   [] Pain [] Taking care of myself    [x] Sleep [] Taking care of others    [] Fatigue [] Safety   [] Tobacco use  [] Work   [] Substance use  [] School   [x] Memory or concentration [] Housing/Utilities   [] Sexual health [] Finances   [] Changes in eating  [] Insurance   [] Loss or change of physical abilities  [] Transportation    []    Emotional Concerns [] Having enough food   [x] Worry or anxiety [] Access to medicine   [x] Sadness or depression [] Treatment decisions   [x] Loss of interest or enjoyment     [x] Grief or loss  Spiritual or Denominational Concerns   [x] Fear [] Sense of meaning or purpose   [] Loneliness  [] Changes in sandra or beliefs   [x] Anger [] Death, dying, or afterlife   [] Changes in appearance [] Conflict between beliefs and cancer treatments    [x] Feelings of worthlessness or being a burden [] Relationship with the sacred    [] Ritual or dietary needs    Social Concerns     [] Relationship with spouse or partner     [] Relationship with children    [] Relationship with family members     [] Relationship with friends or coworkers     [] Communication with health care team     [] Ability to have children     [] Prejudice or discrimination        Other Concerns:

## 2025-07-02 NOTE — PROGRESS NOTES
[facility]    Radiation Oncology Follow Up    Kirby Andersen  [id number]  1975     Diagnosis   Adenoca rectum, stage pending    AJCC Staging has been reviewed.  Oncologic History   Seen as in-patient. Staging workup ongoing    Interval History   Mr. Andersen arrives today for routine follow up .  Mr. Andersen was seen on 6/27 as in-patient.  Had port placed,  PET scan ordered,  Sees Dr. Keyes on 7/8.    Doing well. Still with occ rectal bleeding.  Noticing some problems with bowel movements. Notices some stool incontinence when urinating.    No other GI issues.         Review of Systems:  A complete review of systems was obtained and negative except as described above.    Interval Imaging/Labs     Above imaging/lab reports were reviewed.  Allergies and Medications     Allergies   Allergen Reactions    Cephalexin Anaphylaxis and Shortness Of Breath       Current Outpatient Medications   Medication Instructions    albuterol sulfate HFA (VENTOLIN HFA) 108 (90 Base) MCG/ACT inhaler 2 puffs, Inhalation, 4 TIMES DAILY PRN    metoprolol succinate (TOPROL XL) 25 mg, Oral, DAILY    omeprazole (PRILOSEC) 40 MG delayed release capsule TAKE 1 CAPSULE BY MOUTH DAILY. TAKE 30 MINUTES BEFORE EATING ONCE A DAY INDICATIONS: STOMACH ULCER FROM ASPIRIN/IBUPROFEN-LIKE DRUGS PREVENTION    ondansetron (ZOFRAN) 4 mg, Oral, 3 TIMES DAILY PRN    Semaglutide-Weight Management (WEGOVY) 0.25 mg, SubCUTAneous, EVERY 7 DAYS, For 4 weeks then increase to 0.5 mg once a week    vitamin D (ERGOCALCIFEROL) 50,000 Units, Oral, WEEKLY        Physical Exam:   Vitals: Blood pressure 131/78, pulse 84, temperature 97.2 °F (36.2 °C), temperature source Temporal, resp. rate 20, weight (!) 140.8 kg (310 lb 4.8 oz), SpO2 95%.   Performance Status: ECOG 1, No physically strenuous activity, but ambulatory and able to carry out light or sedentary work (eg office work, light house work)  Constitutional:

## 2025-07-09 ENCOUNTER — OFFICE VISIT (OUTPATIENT)
Facility: CLINIC | Age: 50
End: 2025-07-09

## 2025-07-09 VITALS
DIASTOLIC BLOOD PRESSURE: 80 MMHG | SYSTOLIC BLOOD PRESSURE: 130 MMHG | RESPIRATION RATE: 18 BRPM | HEIGHT: 66 IN | BODY MASS INDEX: 49.66 KG/M2 | OXYGEN SATURATION: 94 % | HEART RATE: 84 BPM | TEMPERATURE: 98.4 F | WEIGHT: 309 LBS

## 2025-07-09 DIAGNOSIS — I10 ESSENTIAL HYPERTENSION: ICD-10-CM

## 2025-07-09 DIAGNOSIS — Z09 HOSPITAL DISCHARGE FOLLOW-UP: Primary | ICD-10-CM

## 2025-07-09 DIAGNOSIS — C20 ADENOCARCINOMA OF RECTUM (HCC): ICD-10-CM

## 2025-07-09 ASSESSMENT — PATIENT HEALTH QUESTIONNAIRE - PHQ9
1. LITTLE INTEREST OR PLEASURE IN DOING THINGS: NOT AT ALL
SUM OF ALL RESPONSES TO PHQ QUESTIONS 1-9: 0
SUM OF ALL RESPONSES TO PHQ QUESTIONS 1-9: 0
2. FEELING DOWN, DEPRESSED OR HOPELESS: NOT AT ALL
SUM OF ALL RESPONSES TO PHQ QUESTIONS 1-9: 0
SUM OF ALL RESPONSES TO PHQ QUESTIONS 1-9: 0

## 2025-07-09 ASSESSMENT — ENCOUNTER SYMPTOMS
ALLERGIC/IMMUNOLOGIC NEGATIVE: 1
GASTROINTESTINAL NEGATIVE: 1
RESPIRATORY NEGATIVE: 1

## 2025-07-09 NOTE — PROGRESS NOTES
Post-Discharge Transitional Care  Follow       Kirby Andersen   YOB: 1975    Date of Office Visit:  7/9/2025  Date of Hospital Admission: 6/23/25  Date of Hospital Discharge: 6/27/25  Risk of hospital readmission (high >=14%. Medium >=10%) :Readmission Risk Score: 5.6      Care management risk score Rising risk (score 2-5) and Complex Care (Scores >=6): No Risk Score On File     Non face to face  following discharge, date last encounter closed (first attempt may have been earlier): 06/30/2025    Call initiated 2 business days of discharge: Yes    ASSESSMENT/PLAN:   Hospital discharge follow-up  -     OK DISCHARGE MEDS RECONCILED W/ CURRENT OUTPATIENT MED LIST  -     Ambulatory Referral to Care Management  Adenocarcinoma of rectum (HCC)  -     Ambulatory Referral to Care Management  Essential hypertension    Medical Decision Making: high complexity  Return in about 6 months (around 1/9/2026) for follow for chronic condition.           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  - CEA unremarkable at 1  - General Surgery following; planning endoscopic ultrasound noted  - EUS confirmed rectal origin, so patient needs neoadjuvant chemotherapy and radiation before surgery, if colon origin proceed with surgery first  - Pathology report discussed with patient and his wife at the bedside  Patient to follow up with oncology and radiation oncology outpatient     Lower GI bleed 2/2 rectal mass  Diverticulosis  Stable hemoglobin with no anemia  CTA abdomen pelvis with contrast on admission negative for any metastatic disease.  Status post colonoscopy on 6/24/2025     Hypertension, controlled    Lower GI

## 2025-07-09 NOTE — PROGRESS NOTES
Chief Complaint   Patient presents with    Other     Hospital follow up            \"Have you been to the ER, urgent care clinic since your last visit?  Hospitalized since your last visit?\"    Yes     “Have you seen or consulted any other health care providers outside our system since your last visit?”    Yes Dr Gallegos

## 2025-07-16 ENCOUNTER — HOSPITAL ENCOUNTER (OUTPATIENT)
Facility: HOSPITAL | Age: 50
Discharge: HOME | End: 2025-07-19
Attending: RADIOLOGY
Payer: COMMERCIAL

## 2025-07-16 DIAGNOSIS — C20 RECTAL CANCER (HCC): ICD-10-CM

## 2025-07-16 PROCEDURE — 72197 MRI PELVIS W/O & W/DYE: CPT

## 2025-07-16 PROCEDURE — A9579 GAD-BASE MR CONTRAST NOS,1ML: HCPCS | Performed by: RADIOLOGY

## 2025-07-16 PROCEDURE — 6360000004 HC RX CONTRAST MEDICATION: Performed by: RADIOLOGY

## 2025-07-16 RX ORDER — GADOTERIDOL 279.3 MG/ML
20 INJECTION INTRAVENOUS ONCE
Status: COMPLETED | OUTPATIENT
Start: 2025-07-16 | End: 2025-07-16

## 2025-07-16 RX ADMIN — GADOTERIDOL 20 ML: 279.3 INJECTION, SOLUTION INTRAVENOUS at 17:49

## 2025-07-21 ENCOUNTER — HOSPITAL ENCOUNTER (OUTPATIENT)
Facility: HOSPITAL | Age: 50
Discharge: HOME | End: 2025-07-24
Attending: INTERNAL MEDICINE
Payer: COMMERCIAL

## 2025-07-21 DIAGNOSIS — C20 MALIGNANT NEOPLASM OF RECTUM (HCC): ICD-10-CM

## 2025-07-21 PROCEDURE — 78815 PET IMAGE W/CT SKULL-THIGH: CPT

## 2025-07-21 PROCEDURE — 3430000000 HC RX DIAGNOSTIC RADIOPHARMACEUTICAL: Performed by: INTERNAL MEDICINE

## 2025-07-21 PROCEDURE — A9609 HC RX DIAGNOSTIC RADIOPHARMACEUTICAL: HCPCS | Performed by: INTERNAL MEDICINE

## 2025-07-21 RX ADMIN — FLUDEOXYGLUCOSE F-18 10.51 MILLICURIE: 500 INJECTION INTRAVENOUS at 13:47

## 2025-07-22 RX ORDER — FLUDEOXYGLUCOSE F-18 500 MCI/ML
10.51 INJECTION INTRAVENOUS
Status: COMPLETED | OUTPATIENT
Start: 2025-07-21 | End: 2025-07-21

## 2025-08-01 ENCOUNTER — HOSPITAL ENCOUNTER (EMERGENCY)
Facility: HOSPITAL | Age: 50
Discharge: HOME | End: 2025-08-01
Attending: EMERGENCY MEDICINE
Payer: COMMERCIAL

## 2025-08-01 ENCOUNTER — APPOINTMENT (OUTPATIENT)
Facility: HOSPITAL | Age: 50
End: 2025-08-01
Payer: COMMERCIAL

## 2025-08-01 VITALS
BODY MASS INDEX: 48.5 KG/M2 | HEIGHT: 67 IN | RESPIRATION RATE: 18 BRPM | WEIGHT: 309 LBS | SYSTOLIC BLOOD PRESSURE: 128 MMHG | DIASTOLIC BLOOD PRESSURE: 78 MMHG | HEART RATE: 89 BPM | OXYGEN SATURATION: 100 % | TEMPERATURE: 98.6 F

## 2025-08-01 DIAGNOSIS — R21 RASH AND OTHER NONSPECIFIC SKIN ERUPTION: ICD-10-CM

## 2025-08-01 DIAGNOSIS — K62.5 BRBPR (BRIGHT RED BLOOD PER RECTUM): Primary | ICD-10-CM

## 2025-08-01 LAB
ANION GAP SERPL CALC-SCNC: 6 MMOL/L (ref 2–12)
BASOPHILS # BLD: 0.04 K/UL (ref 0–0.1)
BASOPHILS NFR BLD: 0.5 % (ref 0–1)
BUN SERPL-MCNC: 10 MG/DL (ref 6–20)
BUN/CREAT SERPL: 13 (ref 12–20)
CA-I BLD-MCNC: 9 MG/DL (ref 8.5–10.1)
CHLORIDE SERPL-SCNC: 105 MMOL/L (ref 97–108)
CO2 SERPL-SCNC: 26 MMOL/L (ref 21–32)
CREAT SERPL-MCNC: 0.78 MG/DL (ref 0.7–1.3)
DIFFERENTIAL METHOD BLD: ABNORMAL
EOSINOPHIL # BLD: 0.13 K/UL (ref 0–0.4)
EOSINOPHIL NFR BLD: 1.7 % (ref 0–7)
ERYTHROCYTE [DISTWIDTH] IN BLOOD BY AUTOMATED COUNT: 12.5 % (ref 11.5–14.5)
GLUCOSE SERPL-MCNC: 116 MG/DL (ref 65–100)
HCT VFR BLD AUTO: 41.4 % (ref 36.6–50.3)
HGB BLD-MCNC: 15.1 G/DL (ref 12.1–17)
IMM GRANULOCYTES # BLD AUTO: 0.03 K/UL (ref 0–0.04)
IMM GRANULOCYTES NFR BLD AUTO: 0.4 % (ref 0–0.5)
LYMPHOCYTES # BLD: 1.61 K/UL (ref 0.8–3.5)
LYMPHOCYTES NFR BLD: 21.2 % (ref 12–49)
MCH RBC QN AUTO: 28.9 PG (ref 26–34)
MCHC RBC AUTO-ENTMCNC: 36.5 G/DL (ref 30–36.5)
MCV RBC AUTO: 79.3 FL (ref 80–99)
MONOCYTES # BLD: 0.36 K/UL (ref 0–1)
MONOCYTES NFR BLD: 4.7 % (ref 5–13)
NEUTS SEG # BLD: 5.44 K/UL (ref 1.8–8)
NEUTS SEG NFR BLD: 71.5 % (ref 32–75)
NRBC # BLD: 0 K/UL (ref 0–0.01)
NRBC BLD-RTO: 0 PER 100 WBC
PLATELET # BLD AUTO: 305 K/UL (ref 150–400)
PMV BLD AUTO: 9.3 FL (ref 8.9–12.9)
POTASSIUM SERPL-SCNC: 4 MMOL/L (ref 3.5–5.1)
RBC # BLD AUTO: 5.22 M/UL (ref 4.1–5.7)
SODIUM SERPL-SCNC: 137 MMOL/L (ref 136–145)
WBC # BLD AUTO: 7.6 K/UL (ref 4.1–11.1)

## 2025-08-01 PROCEDURE — 36415 COLL VENOUS BLD VENIPUNCTURE: CPT

## 2025-08-01 PROCEDURE — 80048 BASIC METABOLIC PNL TOTAL CA: CPT

## 2025-08-01 PROCEDURE — 74177 CT ABD & PELVIS W/CONTRAST: CPT

## 2025-08-01 PROCEDURE — 85025 COMPLETE CBC W/AUTO DIFF WBC: CPT

## 2025-08-01 PROCEDURE — 6360000004 HC RX CONTRAST MEDICATION: Performed by: EMERGENCY MEDICINE

## 2025-08-01 PROCEDURE — 99285 EMERGENCY DEPT VISIT HI MDM: CPT

## 2025-08-01 RX ORDER — CLOTRIMAZOLE AND BETAMETHASONE DIPROPIONATE 10; .64 MG/G; MG/G
CREAM TOPICAL
Qty: 15 G | Refills: 0 | Status: SHIPPED | OUTPATIENT
Start: 2025-08-01

## 2025-08-01 RX ORDER — IOPAMIDOL 755 MG/ML
100 INJECTION, SOLUTION INTRAVASCULAR
Status: COMPLETED | OUTPATIENT
Start: 2025-08-01 | End: 2025-08-01

## 2025-08-01 RX ADMIN — IOPAMIDOL 100 ML: 755 INJECTION, SOLUTION INTRAVENOUS at 14:24

## 2025-08-01 ASSESSMENT — PAIN SCALES - GENERAL: PAINLEVEL_OUTOF10: 0

## 2025-08-01 ASSESSMENT — PAIN - FUNCTIONAL ASSESSMENT: PAIN_FUNCTIONAL_ASSESSMENT: 0-10

## 2025-08-01 NOTE — ED PROVIDER NOTES
[facility] EMERGENCY DEPT  EMERGENCY DEPARTMENT HISTORY AND PHYSICAL EXAM      Date of evaluation: 8/1/2025  Patient Name: Kirby Andersen  Birthdate 1975  MRN: [id number]  ED Provider: Serge Andres MD   Note Started: 12:24 PM EDT 8/1/25    HISTORY OF PRESENT ILLNESS     Chief Complaint   Patient presents with    Multiple Complaints       History Provided By: Patient, only     HPI: Kirby Andersen is a 50 y.o. male With history of rectal cancer presenting with rash, rectal bleeding.  Patient states his first chemotherapy treatment was on Monday.  About 2 days later he started having a red itchy rash over his armpits and around his neck.  He states yesterday when he was laying in bed he had episode of feeling like he could not swallow which resolved after taking a medication those prescribed.  Patient also reports passing large clots of blood with bowel movements    6/2025 colonoscopy  Findings:         -  A polypoid partially obstructing large mass was found at 12 cm            proximal to the anus and in the recto-sigmoid colon.  The mass was            partially circumferential (involving one-half of the lumen            circumference).  The mass measured 3 cm (in length).  No bleeding was            present.  Biopsies were taken with a cold forceps for histology.            Area was tattooed with an injection of 2 mL of Spot (carbon black).            Estimated blood loss was minimal.  Area was tattooed with an            injection of 2 mL of Spot (carbon black).         -  Internal hemorrhoids were found.  The hemorrhoids were Grade II            (internal hemorrhoids that prolapse but reduce spontaneously).  Impression:         -  Rule out malignancy, partially obstructing tumor at 12 cm            proximal to the anus and in the recto-sigmoid colon.  Biopsied.            Tattooed.         -  Internal hemorrhoids.      PAST MEDICAL HISTORY   Past Medical History:  Past Medical History:  affecting Treatment Plan: None    DISPOSITION Decision To Discharge 08/01/2025 03:25:05 PM   DISPOSITION CONDITION Stable         Discharge Note: The patient is stable for discharge home. The signs, symptoms, diagnosis, and discharge instructions have been discussed, understanding conveyed, and agreed upon. The patient is to follow up as recommended or return to ER should their symptoms worsen.      PATIENT REFERRED TO:  Lor Hood MD  [address] [place], [place] [id number] [phone]              DISCHARGE MEDICATIONS:     Medication List        START taking these medications      clotrimazole-betamethasone 1-0.05 % cream  Commonly known as: LOTRISONE  Apply topically 2 times daily.            ASK your doctor about these medications      metoprolol succinate 25 MG extended release tablet  Commonly known as: TOPROL XL  Take 1 tablet by mouth daily               Where to Get Your Medications        These medications were sent to [address] [phone] -  [phone]  [address] [place] [address]      Phone: [phone]   clotrimazole-betamethasone 1-0.05 % cream           DISCONTINUED MEDICATIONS:  Current Discharge Medication List          I am the Primary Clinician of Record. Serge Andres MD (electronically signed)    (Please note that parts of this dictation were completed with voice recognition software. Quite often unanticipated grammatical, syntax, homophones, and other interpretive errors are inadvertently transcribed by the computer software. Please disregards these errors. Please excuse any errors that have escaped final proofreading.)

## 2025-08-01 NOTE — DISCHARGE INSTRUCTIONS
Thank you for choosing our Emergency Department for your care.  It is our privilege to care for you in your time of need.  In the next several days, you may receive a survey via email or mailed to your home about your experience with our team.  We would greatly appreciate you taking a few minutes to complete the survey, as we use this information to learn what we have done well and what we could be doing better. Thank you for trusting us with your care!    Below you will find a list of your tests from today's visit.   Labs and Radiology Studies  Recent Results (from the past 12 hours)   CBC with Auto Differential    Collection Time: 08/01/25 12:25 PM   Result Value Ref Range    WBC 7.6 4.1 - 11.1 K/uL    RBC 5.22 4.10 - 5.70 M/uL    Hemoglobin 15.1 12.1 - 17.0 g/dL    Hematocrit 41.4 36.6 - 50.3 %    MCV 79.3 (L) 80.0 - 99.0 FL    MCH 28.9 26.0 - 34.0 PG    MCHC 36.5 30.0 - 36.5 g/dL    RDW 12.5 11.5 - 14.5 %    Platelets 305 150 - 400 K/uL    MPV 9.3 8.9 - 12.9 FL    Nucleated RBCs 0.0 0.0  WBC    nRBC 0.00 0.00 - 0.01 K/uL    Neutrophils % 71.5 32.0 - 75.0 %    Lymphocytes % 21.2 12.0 - 49.0 %    Monocytes % 4.7 (L) 5.0 - 13.0 %    Eosinophils % 1.7 0.0 - 7.0 %    Basophils % 0.5 0.0 - 1.0 %    Immature Granulocytes % 0.4 0 - 0.5 %    Neutrophils Absolute 5.44 1.80 - 8.00 K/UL    Lymphocytes Absolute 1.61 0.80 - 3.50 K/UL    Monocytes Absolute 0.36 0.00 - 1.00 K/UL    Eosinophils Absolute 0.13 0.00 - 0.40 K/UL    Basophils Absolute 0.04 0.00 - 0.10 K/UL    Immature Granulocytes Absolute 0.03 0.00 - 0.04 K/UL    Differential Type AUTOMATED     BMP    Collection Time: 08/01/25 12:25 PM   Result Value Ref Range    Sodium 137 136 - 145 mmol/L    Potassium 4.0 3.5 - 5.1 mmol/L    Chloride 105 97 - 108 mmol/L    CO2 26 21 - 32 mmol/L    Anion Gap 6 2 - 12 mmol/L    Glucose 116 (H) 65 - 100 mg/dL    BUN 10 6 - 20 mg/dL    Creatinine 0.78 0.70 - 1.30 mg/dL    BUN/Creatinine Ratio 13 12 - 20      Est, Ga Bales

## 2025-08-01 NOTE — ED TRIAGE NOTES
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 He reports that since he was already here, his cancer doctor told him to get checked out because he is bleeding from his rectum more than he had been. Pt also reports he can't sleep and he has a rash under bilateral armpits and his eyes are itching.

## 2025-08-08 PROBLEM — Z09 HOSPITAL DISCHARGE FOLLOW-UP: Status: RESOLVED | Noted: 2025-07-09 | Resolved: 2025-08-08

## (undated) DEVICE — LAMINECTOMY ARM CRADLE FOAM POSITIONER: Brand: CARDINAL HEALTH

## (undated) DEVICE — SYRINGE MED 10ML LUERLOCK TIP W/O SFTY DISP

## (undated) DEVICE — HYPODERMIC SAFETY NEEDLE: Brand: MONOJECT

## (undated) DEVICE — Device: Brand: JELCO

## (undated) DEVICE — GLOVE SURG SZ 7 L12IN FNGR THK79MIL GRN LTX FREE

## (undated) DEVICE — DRAPE EQUIP C ARM 74X42 IN MOB XR W/ TIE RUBBER BND LF

## (undated) DEVICE — FORCEPS BX 240CM 2.4MM L NDL RAD JAW 4 M00513334

## (undated) DEVICE — SOLUTION IV 500 ML 0.9 NACL INJ EXCEL CONTAINER USP LF

## (undated) DEVICE — NEPTUNE E-SEP SMOKE EVACUATION PENCIL, COATED, 70MM BLADE, PUSH BUTTON SWITCH: Brand: NEPTUNE E-SEP

## (undated) DEVICE — GLOVE ORANGE PI 7   MSG9070

## (undated) DEVICE — SUTURE VICRYL + SZ 3-0 L27IN ABSRB UD L26MM SH 1/2 CIR VCP416H

## (undated) DEVICE — LINE SAMPLING ADVANCE ORAL NASAL MICROSTREAM O2 TUBING 6.5'

## (undated) DEVICE — UNDERPANTS INCONT 3XL BARIATRIC FOR 65-85IN WAIST MESH KNIT

## (undated) DEVICE — SYRINGE MED 5ML STD CLR PLAS LUERLOCK TIP N CTRL DISP

## (undated) DEVICE — SUTURE MONOCRYL + SZ 4-0 L27IN ABSRB UD L19MM PS-2 3/8 CIR MCP426H

## (undated) DEVICE — SUTURE PROL SZ 2-0 L30IN NONABSORBABLE BLU L26MM CT-2 1/2 8411H

## (undated) DEVICE — WET SKIN PREP TRAY: Brand: MEDLINE INDUSTRIES, INC.

## (undated) DEVICE — BLADE,CARBON-STEEL,15,STRL,DISPOSABLE,TB: Brand: MEDLINE

## (undated) DEVICE — MINOR VAGINAL PACK: Brand: MEDLINE INDUSTRIES, INC.

## (undated) DEVICE — TUBING, SUCTION, 1/4" X 10', STRAIGHT: Brand: MEDLINE

## (undated) DEVICE — SYRINGE 20ML LL S/C 50

## (undated) DEVICE — MINOR GENERAL: Brand: MEDLINE INDUSTRIES, INC.

## (undated) DEVICE — GARMENT,MEDLINE,DVT,INT,CALF,MED, GEN2: Brand: MEDLINE

## (undated) DEVICE — KIT COLON WITH 1.1 OZ ORCA HYDRA SEAL 2 GOWN ADAPT SECONDARY

## (undated) DEVICE — SOUTHSIDE TURNOVER: Brand: MEDLINE INDUSTRIES, INC.

## (undated) DEVICE — FORCEPS BX L240CM JAW DIA2.8MM L CAP W/ NDL MIC MESH TOOTH

## (undated) DEVICE — NEEDLE SCLERO 23GA L240CM OD0.64MM ID0.32MM CLR INTERJECT

## (undated) DEVICE — DRAPE,UTILITY,TAPE,15X26,STERILE: Brand: MEDLINE

## (undated) DEVICE — SYRINGE IRRIG 60ML SFT PLIABLE BLB EZ TO GRP 1 HND USE W/

## (undated) DEVICE — GLOVE ORANGE PI 7 1/2   MSG9075

## (undated) DEVICE — SOLUTION IV 500ML 0.9% SOD BOTTLE CHL LTWT DURABLE SHATTERPROOF

## (undated) DEVICE — ELECTRODE,RT,STRESS,FOAM,5PK: Brand: MEDLINE

## (undated) DEVICE — DECANTER BAG 9": Brand: MEDLINE INDUSTRIES, INC.

## (undated) DEVICE — BLADE,CARBON-STEEL,11,STRL,DISPOSABLE,TB: Brand: MEDLINE